# Patient Record
Sex: FEMALE | Race: BLACK OR AFRICAN AMERICAN | Employment: OTHER | URBAN - METROPOLITAN AREA
[De-identification: names, ages, dates, MRNs, and addresses within clinical notes are randomized per-mention and may not be internally consistent; named-entity substitution may affect disease eponyms.]

---

## 2017-11-27 ENCOUNTER — HOSPITAL ENCOUNTER (INPATIENT)
Age: 82
LOS: 2 days | Discharge: HOME | DRG: 700 | End: 2017-11-29
Attending: EMERGENCY MEDICINE | Admitting: FAMILY MEDICINE
Payer: MEDICARE

## 2017-11-27 ENCOUNTER — APPOINTMENT (OUTPATIENT)
Dept: ULTRASOUND IMAGING | Age: 82
DRG: 700 | End: 2017-11-27
Attending: FAMILY MEDICINE
Payer: MEDICARE

## 2017-11-27 ENCOUNTER — APPOINTMENT (OUTPATIENT)
Dept: GENERAL RADIOLOGY | Age: 82
DRG: 700 | End: 2017-11-27
Attending: NURSE PRACTITIONER
Payer: MEDICARE

## 2017-11-27 DIAGNOSIS — N04.9 NEPHROTIC SYNDROME: Primary | ICD-10-CM

## 2017-11-27 DIAGNOSIS — R60.1 ANASARCA: ICD-10-CM

## 2017-11-27 PROBLEM — N17.9 ARF (ACUTE RENAL FAILURE) (HCC): Status: ACTIVE | Noted: 2017-11-27

## 2017-11-27 PROBLEM — R80.9 PROTEINURIA: Status: ACTIVE | Noted: 2017-11-27

## 2017-11-27 LAB
ALBUMIN SERPL-MCNC: 0.9 G/DL (ref 3.5–5)
ALBUMIN/GLOB SERPL: 0.2 {RATIO} (ref 1.1–2.2)
ALP SERPL-CCNC: 89 U/L (ref 45–117)
ALT SERPL-CCNC: 9 U/L (ref 12–78)
ANION GAP SERPL CALC-SCNC: 5 MMOL/L (ref 5–15)
APPEARANCE UR: CLEAR
AST SERPL-CCNC: 28 U/L (ref 15–37)
ATRIAL RATE: 77 BPM
BACTERIA URNS QL MICRO: NEGATIVE /HPF
BASOPHILS # BLD: 0 K/UL (ref 0–0.1)
BASOPHILS NFR BLD: 0 % (ref 0–1)
BILIRUB SERPL-MCNC: 0.2 MG/DL (ref 0.2–1)
BILIRUB UR QL: NEGATIVE
BNP SERPL-MCNC: 1341 PG/ML (ref 0–450)
BUN SERPL-MCNC: 11 MG/DL (ref 6–20)
BUN/CREAT SERPL: 17 (ref 12–20)
CALCIUM SERPL-MCNC: 8.5 MG/DL (ref 8.5–10.1)
CALCULATED R AXIS, ECG10: -8 DEGREES
CALCULATED T AXIS, ECG11: 23 DEGREES
CHLORIDE SERPL-SCNC: 110 MMOL/L (ref 97–108)
CK SERPL-CCNC: 156 U/L (ref 26–192)
CO2 SERPL-SCNC: 25 MMOL/L (ref 21–32)
COLOR UR: ABNORMAL
CREAT SERPL-MCNC: 0.64 MG/DL (ref 0.55–1.02)
CREAT UR-MCNC: 41.4 MG/DL
DIAGNOSIS, 93000: NORMAL
EOSINOPHIL # BLD: 0.1 K/UL (ref 0–0.4)
EOSINOPHIL #/AREA URNS HPF: 0 /[HPF]
EOSINOPHIL NFR BLD: 1 % (ref 0–7)
EPITH CASTS URNS QL MICRO: ABNORMAL /LPF
ERYTHROCYTE [DISTWIDTH] IN BLOOD BY AUTOMATED COUNT: 14.1 % (ref 11.5–14.5)
GLOBULIN SER CALC-MCNC: 3.7 G/DL (ref 2–4)
GLUCOSE BLD STRIP.AUTO-MCNC: 50 MG/DL (ref 65–100)
GLUCOSE BLD STRIP.AUTO-MCNC: 59 MG/DL (ref 65–100)
GLUCOSE BLD STRIP.AUTO-MCNC: 68 MG/DL (ref 65–100)
GLUCOSE BLD STRIP.AUTO-MCNC: 70 MG/DL (ref 65–100)
GLUCOSE BLD STRIP.AUTO-MCNC: 90 MG/DL (ref 65–100)
GLUCOSE BLD STRIP.AUTO-MCNC: 99 MG/DL (ref 65–100)
GLUCOSE SERPL-MCNC: 57 MG/DL (ref 65–100)
GLUCOSE UR STRIP.AUTO-MCNC: NEGATIVE MG/DL
HCT VFR BLD AUTO: 38.1 % (ref 35–47)
HGB BLD-MCNC: 12.3 G/DL (ref 11.5–16)
HGB UR QL STRIP: ABNORMAL
HYALINE CASTS URNS QL MICRO: ABNORMAL /LPF (ref 0–5)
KETONES UR QL STRIP.AUTO: NEGATIVE MG/DL
LEUKOCYTE ESTERASE UR QL STRIP.AUTO: NEGATIVE
LIPASE SERPL-CCNC: 220 U/L (ref 73–393)
LYMPHOCYTES # BLD: 1.5 K/UL (ref 0.8–3.5)
LYMPHOCYTES NFR BLD: 28 % (ref 12–49)
MAGNESIUM SERPL-MCNC: 2.1 MG/DL (ref 1.6–2.4)
MAGNESIUM SERPL-MCNC: 2.3 MG/DL (ref 1.6–2.4)
MCH RBC QN AUTO: 31.7 PG (ref 26–34)
MCHC RBC AUTO-ENTMCNC: 32.3 G/DL (ref 30–36.5)
MCV RBC AUTO: 98.2 FL (ref 80–99)
MONOCYTES # BLD: 0.4 K/UL (ref 0–1)
MONOCYTES NFR BLD: 8 % (ref 5–13)
NEUTS SEG # BLD: 3.4 K/UL (ref 1.8–8)
NEUTS SEG NFR BLD: 63 % (ref 32–75)
NITRITE UR QL STRIP.AUTO: NEGATIVE
PH UR STRIP: 6.5 [PH] (ref 5–8)
PHOSPHATE SERPL-MCNC: 2.9 MG/DL (ref 2.6–4.7)
PLATELET # BLD AUTO: 75 K/UL (ref 150–400)
POTASSIUM SERPL-SCNC: 4.5 MMOL/L (ref 3.5–5.1)
PROT SERPL-MCNC: 4.6 G/DL (ref 6.4–8.2)
PROT UR STRIP-MCNC: 300 MG/DL
PROT UR-MCNC: 481 MG/DL (ref 0–11.9)
PROT/CREAT UR-RTO: 11.6
Q-T INTERVAL, ECG07: 386 MS
QRS DURATION, ECG06: 66 MS
QTC CALCULATION (BEZET), ECG08: 436 MS
RBC # BLD AUTO: 3.88 M/UL (ref 3.8–5.2)
RBC #/AREA URNS HPF: ABNORMAL /HPF (ref 0–5)
SERVICE CMNT-IMP: ABNORMAL
SERVICE CMNT-IMP: ABNORMAL
SERVICE CMNT-IMP: NORMAL
SODIUM SERPL-SCNC: 140 MMOL/L (ref 136–145)
SP GR UR REFRACTOMETRY: 1.01 (ref 1–1.03)
TROPONIN I SERPL-MCNC: <0.04 NG/ML
UR CULT HOLD, URHOLD: NORMAL
UROBILINOGEN UR QL STRIP.AUTO: 0.2 EU/DL (ref 0.2–1)
VENTRICULAR RATE, ECG03: 77 BPM
WBC # BLD AUTO: 5.4 K/UL (ref 3.6–11)
WBC URNS QL MICRO: ABNORMAL /HPF (ref 0–4)

## 2017-11-27 PROCEDURE — 77030005563 HC CATH URETH INT MMGH -A

## 2017-11-27 PROCEDURE — 82784 ASSAY IGA/IGD/IGG/IGM EACH: CPT | Performed by: INTERNAL MEDICINE

## 2017-11-27 PROCEDURE — 71020 XR CHEST PA LAT: CPT

## 2017-11-27 PROCEDURE — 93005 ELECTROCARDIOGRAM TRACING: CPT

## 2017-11-27 PROCEDURE — 84484 ASSAY OF TROPONIN QUANT: CPT | Performed by: NURSE PRACTITIONER

## 2017-11-27 PROCEDURE — 80053 COMPREHEN METABOLIC PANEL: CPT | Performed by: NURSE PRACTITIONER

## 2017-11-27 PROCEDURE — 74011250636 HC RX REV CODE- 250/636: Performed by: INTERNAL MEDICINE

## 2017-11-27 PROCEDURE — 83690 ASSAY OF LIPASE: CPT | Performed by: NURSE PRACTITIONER

## 2017-11-27 PROCEDURE — 51701 INSERT BLADDER CATHETER: CPT

## 2017-11-27 PROCEDURE — 83735 ASSAY OF MAGNESIUM: CPT | Performed by: FAMILY MEDICINE

## 2017-11-27 PROCEDURE — 99285 EMERGENCY DEPT VISIT HI MDM: CPT

## 2017-11-27 PROCEDURE — 36415 COLL VENOUS BLD VENIPUNCTURE: CPT | Performed by: FAMILY MEDICINE

## 2017-11-27 PROCEDURE — 82962 GLUCOSE BLOOD TEST: CPT

## 2017-11-27 PROCEDURE — 84156 ASSAY OF PROTEIN URINE: CPT | Performed by: FAMILY MEDICINE

## 2017-11-27 PROCEDURE — 84166 PROTEIN E-PHORESIS/URINE/CSF: CPT | Performed by: INTERNAL MEDICINE

## 2017-11-27 PROCEDURE — 87205 SMEAR GRAM STAIN: CPT | Performed by: FAMILY MEDICINE

## 2017-11-27 PROCEDURE — 74011000250 HC RX REV CODE- 250: Performed by: FAMILY MEDICINE

## 2017-11-27 PROCEDURE — 93970 EXTREMITY STUDY: CPT

## 2017-11-27 PROCEDURE — 51798 US URINE CAPACITY MEASURE: CPT

## 2017-11-27 PROCEDURE — 83735 ASSAY OF MAGNESIUM: CPT | Performed by: NURSE PRACTITIONER

## 2017-11-27 PROCEDURE — 65270000032 HC RM SEMIPRIVATE

## 2017-11-27 PROCEDURE — 84100 ASSAY OF PHOSPHORUS: CPT | Performed by: FAMILY MEDICINE

## 2017-11-27 PROCEDURE — 83880 ASSAY OF NATRIURETIC PEPTIDE: CPT | Performed by: NURSE PRACTITIONER

## 2017-11-27 PROCEDURE — P9045 ALBUMIN (HUMAN), 5%, 250 ML: HCPCS | Performed by: INTERNAL MEDICINE

## 2017-11-27 PROCEDURE — 84165 PROTEIN E-PHORESIS SERUM: CPT | Performed by: INTERNAL MEDICINE

## 2017-11-27 PROCEDURE — 82550 ASSAY OF CK (CPK): CPT | Performed by: NURSE PRACTITIONER

## 2017-11-27 PROCEDURE — 85025 COMPLETE CBC W/AUTO DIFF WBC: CPT | Performed by: NURSE PRACTITIONER

## 2017-11-27 PROCEDURE — 65660000000 HC RM CCU STEPDOWN

## 2017-11-27 PROCEDURE — 76770 US EXAM ABDO BACK WALL COMP: CPT

## 2017-11-27 PROCEDURE — 81001 URINALYSIS AUTO W/SCOPE: CPT | Performed by: NURSE PRACTITIONER

## 2017-11-27 RX ORDER — FUROSEMIDE 10 MG/ML
20 INJECTION INTRAMUSCULAR; INTRAVENOUS 2 TIMES DAILY
Status: DISCONTINUED | OUTPATIENT
Start: 2017-11-27 | End: 2017-11-27 | Stop reason: SDUPTHER

## 2017-11-27 RX ORDER — METOPROLOL SUCCINATE 50 MG/1
50 TABLET, EXTENDED RELEASE ORAL DAILY
Status: DISCONTINUED | OUTPATIENT
Start: 2017-11-28 | End: 2017-11-29 | Stop reason: HOSPADM

## 2017-11-27 RX ORDER — FUROSEMIDE 10 MG/ML
20 INJECTION INTRAMUSCULAR; INTRAVENOUS EVERY 12 HOURS
Status: DISCONTINUED | OUTPATIENT
Start: 2017-11-27 | End: 2017-11-29 | Stop reason: HOSPADM

## 2017-11-27 RX ORDER — FLUTICASONE PROPIONATE 50 MCG
1 SPRAY, SUSPENSION (ML) NASAL DAILY
COMMUNITY

## 2017-11-27 RX ORDER — SODIUM CHLORIDE 0.9 % (FLUSH) 0.9 %
5-10 SYRINGE (ML) INJECTION EVERY 8 HOURS
Status: DISCONTINUED | OUTPATIENT
Start: 2017-11-27 | End: 2017-11-29 | Stop reason: HOSPADM

## 2017-11-27 RX ORDER — MELATONIN
1000 DAILY
COMMUNITY

## 2017-11-27 RX ORDER — ALBUMIN HUMAN 50 G/1000ML
12.5 SOLUTION INTRAVENOUS EVERY 6 HOURS
Status: DISCONTINUED | OUTPATIENT
Start: 2017-11-27 | End: 2017-11-28 | Stop reason: SDUPTHER

## 2017-11-27 RX ORDER — HEPARIN SODIUM 5000 [USP'U]/ML
5000 INJECTION, SOLUTION INTRAVENOUS; SUBCUTANEOUS EVERY 8 HOURS
Status: CANCELLED | OUTPATIENT
Start: 2017-11-27

## 2017-11-27 RX ORDER — DEXTROSE 50 % IN WATER (D50W) INTRAVENOUS SYRINGE
12.5-25 AS NEEDED
Status: DISCONTINUED | OUTPATIENT
Start: 2017-11-27 | End: 2017-11-29 | Stop reason: HOSPADM

## 2017-11-27 RX ORDER — MAGNESIUM SULFATE 100 %
4 CRYSTALS MISCELLANEOUS AS NEEDED
Status: DISCONTINUED | OUTPATIENT
Start: 2017-11-27 | End: 2017-11-29 | Stop reason: HOSPADM

## 2017-11-27 RX ORDER — AMOXICILLIN AND CLAVULANATE POTASSIUM 875; 125 MG/1; MG/1
1 TABLET, FILM COATED ORAL 2 TIMES DAILY
COMMUNITY
Start: 2017-11-23 | End: 2017-11-29

## 2017-11-27 RX ORDER — SODIUM CHLORIDE 0.9 % (FLUSH) 0.9 %
5-10 SYRINGE (ML) INJECTION AS NEEDED
Status: DISCONTINUED | OUTPATIENT
Start: 2017-11-27 | End: 2017-11-29 | Stop reason: HOSPADM

## 2017-11-27 RX ORDER — SULFAMETHOXAZOLE AND TRIMETHOPRIM 800; 160 MG/1; MG/1
1 TABLET ORAL 2 TIMES DAILY
COMMUNITY
Start: 2017-11-23 | End: 2017-11-29

## 2017-11-27 RX ORDER — ALBUMIN HUMAN 250 G/1000ML
12.5 SOLUTION INTRAVENOUS EVERY 6 HOURS
Status: DISCONTINUED | OUTPATIENT
Start: 2017-11-27 | End: 2017-11-27 | Stop reason: SDUPTHER

## 2017-11-27 RX ADMIN — FUROSEMIDE 20 MG: 10 INJECTION, SOLUTION INTRAMUSCULAR; INTRAVENOUS at 18:18

## 2017-11-27 RX ADMIN — Medication 10 ML: at 21:49

## 2017-11-27 RX ADMIN — Medication 10 ML: at 18:18

## 2017-11-27 RX ADMIN — DEXTROSE MONOHYDRATE 12.5 G: 500 INJECTION PARENTERAL at 19:30

## 2017-11-27 RX ADMIN — ALBUMIN (HUMAN) 12.5 G: 12.5 INJECTION, SOLUTION INTRAVENOUS at 18:43

## 2017-11-27 NOTE — PROGRESS NOTES
Admission Medication Reconciliation:    Information obtained from: patient, RX Query    Significant PMH/Disease States:   Past Medical History:   Diagnosis Date    High cholesterol     Hypercholesterolemia     Hypertension     Other ill-defined conditions(799.89)     kidney stones       Chief Complaint for this Admission:    Chief Complaint   Patient presents with    Hand Swelling       Allergies:  Seafood [shellfish containing products]    Prior to Admission Medications:   Prior to Admission Medications   Prescriptions Last Dose Informant Patient Reported? Taking?   amoxicillin-clavulanate (AUGMENTIN) 875-125 mg per tablet 2017 at AM  Yes Yes   Si Tab two (2) times a day. (Questionable compliance, see note - started 17 of 10 day course, last dose 17 AM)   cholecalciferol (VITAMIN D3) 1,000 unit tablet   Yes Yes   Sig: Take 1,000 Units by mouth daily as needed. fluticasone (FLONASE) 50 mcg/actuation nasal spray   Yes Yes   Si [place] by Both Nostrils route daily. metoprolol-XL (TOPROL XL) 50 mg XL tablet 2017 at AM  Yes Yes   Sig: Take 50 mg by mouth daily. trimethoprim-sulfamethoxazole (BACTRIM DS, SEPTRA DS) 160-800 mg per tablet 2017 at PM  Yes Yes   Sig: Take 1 Tab by mouth two (2) times a day. (Questionable compliance, see note - started 17 on 10 day course, last dose 17 PM)      Facility-Administered Medications: None         Comments/Recommendations: All medications/allergies have been reviewed and updated; last medication administration times reviewed and recorded. Patient is a pleasant woman, but an unreliable historian. Obtained list from [address] [place] [address] and compared to information given by patient.      NOTE: Patient was prescribed 20 tablets (10 day courses) of both Bactrim and Augmentin, but states that she is only taking 1 pill of Augmentin (AM) and 1 pill of Bactrim (PM) each day    Changes made to Prior to Admission (PTA) Medication List: Medications Added:  - None     Medications Changed:  - Augmentin 875/125 frequency of 1 tab BID added   - Bactrim DS frequency of 1 tab BID added     Medications Removed:  - thiamine (B1) 100 mg   - atorvastatin 10 mg       Thank you for allowing me to be involved in the care of this patient. Please contact the pharmacy () or the medication reconciliation pharmacist () with any questions.     Tk Antonio, PharmD Candidate Class of 3537

## 2017-11-27 NOTE — IP AVS SNAPSHOT
[address] 
[phone] Patient: Marie Iniguez MRN: [id number] [id number] About your hospitalization You were admitted on:  November 27, 2017 You last received care in the:  [facility] You were discharged on:  November 29, 2017 Why you were hospitalized Your primary diagnosis was:  Proteinuria Things You Need To Do (next 8 weeks) Schedule an appointment with Tk Zhao MD as soon as possible for a visit in 1 week(s) For follow up of kidney disease Phone:  [phone] Where:  [facility], [address] [place] [address], [address] [place] [address] Discharge Orders None A check nichelle indicates which time of day the medication should be taken. My Medications STOP taking these medications   
 amoxicillin-clavulanate 875-125 mg per tablet Commonly known as:  AUGMENTIN  
   
  
 LIPITOR 10 mg tablet Generic drug:  atorvastatin  
   
  
 trimethoprim-sulfamethoxazole 160-800 mg per tablet Commonly known as:  BACTRIM DS, SEPTRA DS  
   
  
  
TAKE these medications as instructed Instructions Each Dose to Equal  
 Morning Noon Evening Bedtime  
 fluticasone 50 mcg/actuation nasal spray Commonly known as:  [place] Peel Your last dose was: Your next dose is:    
   
   
 1 Spray by Both Nostrils route daily. 1 Spray  
    
   
   
   
  
 furosemide 40 mg tablet Commonly known as:  LASIX Your last dose was: Your next dose is: Take 1 Tab by mouth daily. 40 mg  
    
   
   
   
  
 TOPROL XL 50 mg XL tablet Generic drug:  metoprolol succinate Your last dose was: Your next dose is: Take 50 mg by mouth daily. 50 mg  
    
   
   
   
  
 VITAMIN D3 1,000 unit tablet Generic drug:  cholecalciferol Your last dose was: Your next dose is: Take 1,000 Units by mouth daily as needed. 1000 Units Where to Get Your Medications These medications were sent to [facility], [address], [address] [place] [id number] Phone:  [phone]  
  furosemide 40 mg tablet Discharge Instructions Please bring this form with you to show your primary care provider at your follow-up appointment. Primary care provider:  Dr. Justyna Morrow MD 
 
Discharging provider:  Janet Vazquez MD 
 
You have been admitted to the hospital with the following diagnoses: · Swelling (anasarca) · Protein in the urine FOLLOW-UP CARE RECOMMENDATIONS: 
 
APPOINTMENTS: 
Follow-up Information Follow up With Details Comments Contact Info Anay Mendez MD Schedule an appointment as soon as possible for a visit in 1 week For follow up of kidney disease [facility] [phone] 
[phone] PENDING TEST RESULTS: 
At the time of your discharge the following test results are still pending: Kidney biopsy Please make sure you review these results with your outpatient follow-up provider(s). SYMPTOMS to watch for: flank pain, bleeding, weakness. DIET/what to eat:  Cardiac Diet ACTIVITY:  Activity as tolerated OTHER medication instructions:  Please do not take NSAIDs (ibuprofen, motrin, aspirin, Alleve, naprosyn, etc.). These medicines can hurt your kidneys. If you have pain or fever, you can take acetaminophen (Tylenol). What to do if new or unexpected symptoms occur? If you experience any of the above symptoms (or should other concerns or questions arise after discharge) please call your primary care physician. Return to the emergency room if you cannot get hold of your doctor. · It is very important that you keep your follow-up appointment(s). · Please bring discharge papers, medication list (and/or medication bottles) to your follow-up appointments for review by your outpatient provider(s). · Please check the list of medications and be sure it includes every medication (even non-prescription medications) that your provider wants you to take. · It is important that you take the medication exactly as they are prescribed. · Keep your medication in the bottles provided by the pharmacist and keep a list of the medication names, dosages, and times to be taken in your wallet. · Do not take other medications without consulting your doctor. · If you have any questions about your medications or other instructions, please talk to your nurse or care provider before you leave the hospital. 
 
I understand that if any problems occur once I am at home I am to contact my physician. These instructions were explained to me and I had the opportunity to ask questions. [employer] Announcement We are excited to announce that we are making your provider's discharge notes available to you in [employer]. You will see these notes when they are completed and signed by the physician that discharged you from your recent hospital stay. If you have any questions or concerns about any information you see in [employer], please call the Health Information Department where you were seen or reach out to your Primary Care Provider for more information about your plan of care. Introducing [facility]! Kenya Wilder introduces [employer] patient portal. Now you can access parts of your medical record, email your doctor's office, and request medication refills online. 1. In your internet browser, go to [email or web address] 2. Click on the First Time User? Click Here link in the Sign In box. You will see the New Member Sign Up page. 3. Enter your [employer] Access Code exactly as it appears below.  You will not need to use this code after youve completed the sign-up process. If you do not sign up before the expiration date, you must request a new code. · [employer] Access Code: [email or web address] Expires: 2/27/2018  1:51 PM 
 
4. Enter the last four digits of your Social Security Number (xxxx) and Date of Birth (mm/dd/yyyy) as indicated and click Submit. You will be taken to the next sign-up page. 5. Create a [employer] ID. This will be your [employer] login ID and cannot be changed, so think of one that is secure and easy to remember. 6. Create a [employer] password. You can change your password at any time. 7. Enter your Password Reset Question and Answer. This can be used at a later time if you forget your password. 8. Enter your e-mail address. You will receive e-mail notification when new information is available in [address] 9. Click Sign Up. You can now view and download portions of your medical record. 10. Click the Download Summary menu link to download a portable copy of your medical information. If you have questions, please visit the Frequently Asked Questions section of the [employer] website. Remember, [employer] is NOT to be used for urgent needs. For medical emergencies, dial 911. Now available from your iPhone and Android! Unresulted Labs-Please follow up with your PCP about these lab tests Order Current Status PROTEIN ELECTROPHORESIS In process Providers Seen During Your Hospitalization Provider Specialty Primary office phone Yue Rutledge, [address] [place] [address] Emergency Medicine [phone] Jen Tilley MD Hospitalist [phone] Jazzmine Gottlieb MD Internal Medicine [phone] Immunizations Administered for This Admission Name Date Influenza Vaccine (Quad) PF  Deferred () Your Primary Care Physician (PCP) Primary Care Physician Office Phone Office Fax OTHER, PHYS ** None ** ** None ** You are allergic to the following Allergen Reactions Seafood (Shellfish Containing Products) Swelling Recent Documentation Height Weight BMI OB Status Smoking Status 1.626 m 64.3 kg 24.33 kg/m2 Postmenopausal Former Smoker Emergency Contacts Name Discharge Info Relation Home Work Mobile [address]  Other Relative [6] [phone] [address]  Other Relative [6] [phone] Dean Riley DISCHARGE CAREGIVER [3] Sister [23]   [phone] [facility] [facility] DISCHARGE CAREGIVER [3] Son [22]   [phone] Patient Belongings The following personal items are in your possession at time of discharge: 
     Visual Aid: Glasses, At bedside Please provide this summary of care documentation to your next provider. Signatures-by signing, you are acknowledging that this After Visit Summary has been reviewed with you and you have received a copy. Patient Signature:  ____________________________________________________________ Date:  ____________________________________________________________  
  
[facility] Provider Signature:  ____________________________________________________________ Date:  ____________________________________________________________

## 2017-11-27 NOTE — PROCEDURES
[address]  *** FINAL REPORT ***    Name: Alicia Krishnan  MRN: [id number]    Inpatient  : 29 Dec 1930  HIS Order #: [id number]  [id number] [facility] Visit #: [id number]  Date: 2017    TYPE OF TEST: Peripheral Venous Testing    REASON FOR TEST  LE swelling    Right Leg:-  Deep venous thrombosis:           No  Superficial venous thrombosis:    No  Deep venous insufficiency:        Not examined  Superficial venous insufficiency: Not examined    Left Leg:-  Deep venous thrombosis:           No  Superficial venous thrombosis:    No  Deep venous insufficiency:        Not examined  Superficial venous insufficiency: Not examined      INTERPRETATION/FINDINGS  PROCEDURE:  Color duplex ultrasound imaging of lower extremity veins. FINDINGS:       Right: The common femoral, deep femoral, femoral, popliteal,  posterior tibial, peroneal, and great saphenous are patent and without   evidence of thrombus;  each is fully compressible and there is no  narrowing of the flow channel on color Doppler imaging. Phasic flow  is observed in the common femoral vein. Left:   The common femoral, deep femoral, femoral, popliteal,  posterior tibial, peroneal, and great saphenous are patent and without   evidence of thrombus;  each is fully compressible and there is no  narrowing of the flow channel on color Doppler imaging. Phasic flow  is observed in the common femoral vein. IMPRESSION:  No evidence of right or left lower extremity vein  thrombosis. ADDITIONAL COMMENTS    I have personally reviewed the data relevant to the interpretation of  this  study.     TECHNOLOGIST: Mychal Coats RVT  Signed: 2017 05:10 PM    PHYSICIAN: Patrica Joe MD  Signed: 2017 12:09 PM

## 2017-11-27 NOTE — ED TRIAGE NOTES
Pt c/o of swelling that started in the L hand and is now in the R hand. States that the swelling started two weeks ago. Denies injury. Denies SOB. [facility] for same complaint, was prescribed two antibiotics, but still having swelling.

## 2017-11-27 NOTE — ED PROVIDER NOTES
HPI Comments: Patient is an 80year old female who comes to the ED today via private vehicle with complaints of Bilateral hand swelling. Patient states about 2 weeks ago she began to have the hand swelling. On November 23 rd her son took her to [facility] for the hand swelling. She was diagnosed with cellulitis of the chest wall and started on Augmentin and Bactrim. The swelling did not resolve so she came to the ED today. Patient stated that she also had \"some leg swelling\" that started about 2 weeks ago as well. Patient has a past medical history of HTN, dislipidemia & kidney stones. She denies and liver or CHF, but did state that she has a family history of CHF. Her medications were reviewed and updated. She has no further complaints at this time. Primary Care provider: Delgado Stroud MD      The history is provided by the patient. No  was used. Past Medical History:   Diagnosis Date    High cholesterol     Hypercholesterolemia     Hypertension     Other ill-defined conditions(799.89)     kidney stones       Past Surgical History:   Procedure Laterality Date    HX APPENDECTOMY  1960    REMOVAL GALLBLADDER  11/14/2010         Family History:   Problem Relation Age of Onset    Heart Disease Father     Stroke Sister     Heart Disease Sister     Diabetes Sister        Social History     Social History    Marital status:      Spouse name: N/A    Number of children: N/A    Years of education: N/A     Occupational History    Not on file.      Social History Main Topics    Smoking status: Former Smoker    Smokeless tobacco: Never Used    Alcohol use Yes      Comment: occassionally    Drug use: No    Sexual activity: Yes     Partners: Male     Birth control/ protection: None     Other Topics Concern    Not on file     Social History Narrative    ** Merged History Encounter **              ALLERGIES: Seafood [shellfish containing products]    Review of Systems Constitutional: Positive for activity change. Negative for chills, fatigue and fever. HENT: Positive for congestion. Respiratory: Positive for cough (white sputum). Cardiovascular: Positive for leg swelling (ANASARCA to collarbone and BUE). Negative for chest pain and palpitations. Gastrointestinal: Negative for abdominal pain, constipation, diarrhea, nausea and vomiting. Genitourinary: Negative for difficulty urinating, dysuria and urgency. Musculoskeletal: Negative for arthralgias, back pain and neck pain. Skin: Negative for color change. Neurological: Negative for dizziness, weakness and light-headedness. Psychiatric/Behavioral: Negative. All other systems reviewed and are negative. Vitals:    11/27/17 1146   BP: 136/74   Pulse: 87   Resp: 16   Temp: 97.4 °F (36.3 °C)   SpO2: 100%   Weight: 63.8 kg (140 lb 9.6 oz)   Height: 5' 4\" (1.626 m)            Physical Exam   Constitutional: She is oriented to person, place, and time. She appears well-developed and well-nourished. HENT:   Head: Normocephalic and atraumatic. Cardiovascular: Regular rhythm and normal heart sounds. Pulses:       Radial pulses are 2+ on the right side, and 2+ on the left side. Posterior tibial pulses are 2+ on the right side, and 2+ on the left side. Pulmonary/Chest: No respiratory distress. She has no wheezes. She has no rales. She exhibits no tenderness. Chest wall edema   Abdominal: Soft. Normal appearance and bowel sounds are normal. She exhibits no distension and no mass. There is no tenderness. There is no rebound, no guarding and no CVA tenderness. Abdominal wall edema     Musculoskeletal: She exhibits edema. She exhibits no tenderness or deformity. +4 upper and lower extremity edema. Non-pitting. Neurological: She is alert and oriented to person, place, and time. She has normal strength. She displays a negative Romberg sign. GCS eye subscore is 4. GCS verbal subscore is 5.  GCS motor subscore is 6. Skin: Skin is warm and dry. Psychiatric: She has a normal mood and affect. Her behavior is normal. Judgment and thought content normal.   Nursing note and vitals reviewed. MDM  Number of Diagnoses or Management Options  Anasarca:   Nephrotic syndrome:   Diagnosis management comments: Assessment & Plan:   EKG/Trop-I/CK  Pro-BNP  UA with micro. Hold sample for urine culture  CMP  Lipase  CBC with DIFF    Magnesium  CXR, PA/LAT    Discussed with Dr. Jose Maria Escobar. MD Tana Vázquez NP  11/27/17  12:34 PM    ED EKG interpretation: 1:06 PM  Rhythm: Accelerated Junctional rhythm; and regular . Rate (approx.): 77; Axis: normal; P wave: normal; QRS interval: low voltage QRS; ST/T wave: normal; Other findings: abnormal ekg. This EKG was interpreted by Jose Maria Escobar. Helen Martinez MD,ED Provider. Labs returned:   Hypoalbuminemia  Proteinuria   Hypoglycemia  Mild heart failure    Consult nephrology for nephrotic syndrome  Consult hospitalist for admission    3:08 PM  Patient is being admitted to the hospital.  The results of their tests and reasons for their admission have been discussed with them and/or available family. They convey agreement and understanding for the need to be admitted and for their admission diagnosis. Consultation has been made with the inpatient physician specialist for hospitalization.     LABORATORY TESTS:  Recent Results (from the past 12 hour(s))  -EKG, 12 LEAD, INITIAL  Collection Time: 11/27/17  1:06 PM       Result                                            Value                         Ref Range                       Ventricular Rate                                  77                            BPM                             Atrial Rate                                       77                            BPM                             QRS Duration                                      66                            ms                              Q-T Interval 386                           ms                              QTC Calculation (Bezet)                           436                           ms                              Calculated R Axis                                 -8                            degrees                         Calculated T Axis                                 23                            degrees                         Diagnosis                                                                                                   Accelerated Junctional rhythm Low voltage QRS When compared with ECG of 12-JUN-2012 14:56, Junctional rhythm has replaced Sinus rhythm QRS voltage has decreased Nonspecific T wave abnormality now evident in Anterolateral leads   -METABOLIC PANEL, COMPREHENSIVE  Collection Time: 11/27/17  1:26 PM       Result                                            Value                         Ref Range                       Sodium                                            140                           136 - 145 mmol/L                Potassium                                         4.5                           3.5 - 5.1 mmol/L                Chloride                                          110 (H)                       97 - 108 mmol/L                 CO2                                               25                            21 - 32 mmol/L                  Anion gap                                         5                             5 - 15 mmol/L                   Glucose                                           57 (L)                        65 - 100 mg/dL                  BUN                                               11                            6 - 20 MG/DL                    Creatinine                                        0.64                          0.55 - 1.02 MG/DL               BUN/Creatinine ratio                              17                            12 - 20 GFR est AA                                        >60                           >60 ml/min/1.73m2               GFR est non-AA                                    >60                           >60 ml/min/1.73m2               Calcium                                           8.5                           8.5 - 10.1 MG/DL                Bilirubin, total                                  0.2                           0.2 - 1.0 MG/DL                 ALT (SGPT)                                        9 (L)                         12 - 78 U/L                     AST (SGOT)                                        28                            15 - 37 U/L                     Alk. phosphatase                                  89                            45 - 117 U/L                    Protein, total                                    4.6 (L)                       6.4 - 8.2 g/dL                  Albumin                                           0.9 (L)                       3.5 - 5.0 g/dL                  Globulin                                          3.7                           2.0 - 4.0 g/dL                  A-G Ratio                                         0.2 (L)                       1.1 - 2.2                  -MAGNESIUM  Collection Time: 11/27/17  1:26 PM       Result                                            Value                         Ref Range                       Magnesium                                         2.3                           1.6 - 2.4 mg/dL            -NT-PRO BNP  Collection Time: 11/27/17  1:26 PM       Result                                            Value                         Ref Range                       NT pro-BNP                                        1341 (H)                      0 - 450 PG/ML              -CBC WITH AUTOMATED DIFF  Collection Time: 11/27/17  1:26 PM       Result                                            Value                         Ref Range WBC                                               5.4                           3.6 - 11.0 K/uL                 RBC                                               3.88                          3.80 - 5.20 M/uL                HGB                                               12.3                          11.5 - 16.0 g/dL                HCT                                               38.1                          35.0 - 47.0 %                   MCV                                               98.2                          80.0 - 99.0 FL                  MCH                                               31.7                          26.0 - 34.0 PG                  MCHC                                              32.3                          30.0 - 36.5 g/dL                RDW                                               14.1                          11.5 - 14.5 %                   PLATELET                                          75 (L)                        150 - 400 K/uL                  NEUTROPHILS                                       63                            32 - 75 %                       LYMPHOCYTES                                       28                            12 - 49 %                       MONOCYTES                                         8                             5 - 13 %                        EOSINOPHILS                                       1                             0 - 7 %                         BASOPHILS                                         0                             0 - 1 %                         ABS. NEUTROPHILS                                  3.4                           1.8 - 8.0 K/UL                  ABS. LYMPHOCYTES                                  1.5                           0.8 - 3.5 K/UL                  ABS. MONOCYTES                                    0.4                           0.0 - 1.0 K/UL                  ABS.  EOSINOPHILS 0.1                           0.0 - 0.4 K/UL                  ABS.  BASOPHILS                                    0.0                           0.0 - 0.1 K/UL             -LIPASE  Collection Time: 11/27/17  1:26 PM       Result                                            Value                         Ref Range                       Lipase                                            220                           73 - 393 U/L               -TROPONIN I  Collection Time: 11/27/17  1:26 PM       Result                                            Value                         Ref Range                       Troponin-I, Qt.                                   <0.04                         <0.05 ng/mL                -URINALYSIS W/MICROSCOPIC  Collection Time: 11/27/17  1:26 PM       Result                                            Value                         Ref Range                       Color                                             YELLOW/STRAW                                                  Appearance                                        CLEAR                         CLEAR                           Specific gravity                                  1.012                         1.003 - 1.030                   pH (UA)                                           6.5                           5.0 - 8.0                       Protein                                           300 (A)                       NEG mg/dL                       Glucose                                           NEGATIVE                      NEG mg/dL                       Ketone                                            NEGATIVE                      NEG mg/dL                       Bilirubin                                         NEGATIVE                      NEG                             Blood                                             SMALL (A)                     NEG                             Urobilinogen 0.2                           0.2 - 1.0 EU/dL                 Nitrites                                          NEGATIVE                      NEG                             Leukocyte Esterase                                NEGATIVE                      NEG                             WBC                                               0-4                           0 - 4 /hpf                      RBC                                               5-10                          0 - 5 /hpf                      Epithelial cells                                  FEW                           FEW /lpf                        Bacteria                                          NEGATIVE                      NEG /hpf                        Hyaline cast                                      0-2                           0 - 5 /lpf                 -URINE CULTURE HOLD SAMPLE  Collection Time: 11/27/17  1:26 PM       Result                                            Value                         Ref Range                       Urine culture hold                                                                                          URINE ON HOLD IN MICROBIOLOGY DEPT FOR 3 DAYS  -CK W/ REFLX CKMB  Collection Time: 11/27/17  1:26 PM       Result                                            Value                         Ref Range                       CK                                                156                           26 - 192 U/L                 IMAGING RESULTS:  XR CHEST PA LAT   Final Result     Xr Chest Pa Lat    Result Date: 11/27/2017  EXAM:  XR CHEST PA LAT INDICATION:   anasarca COMPARISON: 11/16/2010. FINDINGS: PA and lateral radiographs of the chest demonstrate small left pleural effusion. There may also be a tiny right pleural effusion. Mliss Argueat Heart size normal. Atherosclerotic change of the aorta is noted. .  Degenerative changes of the thoracic spine noted. IMPRESSION: Small left pleural effusion. MEDICATIONS GIVEN:  Medications - No data to display    IMPRESSION:  Nephrotic syndrome  (primary encounter diagnosis)  Anasarca    PLAN:  1.  Admit to hospitalist    Total critical care time spent exclusive of procedures:  45 minutes      Kerwin Schirmer, NP         Amount and/or Complexity of Data Reviewed  Clinical lab tests: ordered and reviewed  Tests in the radiology section of CPT®: ordered and reviewed  Review and summarize past medical records: yes  Discuss the patient with other providers: yes    Critical Care  Total time providing critical care: 30-74 minutes    Patient Progress  Patient progress: stable    ED Course       Procedures

## 2017-11-27 NOTE — CONSULTS
NEPHROLOGY CONSULT NOTE     Patient: Sabina Paulino MRN: [id number]  PCP: Sohail Andrea MD   :     1930  Age:   80 y.o. Sex:  female      Referring physician: Yue Persaud. Mariola Rutledge MD  Reason for consultation: 80 y.o. female with edema and proteinuria  Admission Date: 2017 11:50 AM  LOS: 0 days      ASSESSMENT and PLAN :   Anasarca:  - could be related to her proteinuria  - agree with IV albumin and lasix for now  - would check dopplers of her upper exts  - check ECHO eval her LV fn  - will check urine PCR to quantify her proteinuria    Proteinuria:  - concern for CORNELIO given her recent exposure to regular NSAIDs  - unlikely abx since her symptoms predated her abx exposure  - other possibilities include Membranous, FSGS or paraprotein-related d/o  - check urine PCR, SPEP, UPEP, MIKA    HTN:  - BP stable    Anemia:  - hgb stable     Active Problems / Assessment AAActive  : Active Problems:    ARF (acute renal failure) ([facility] [address] (2017)         Subjective:   HPI: Sabina Paulino is a 80 y.o.  female who has been admitted to the hospital for bilateral hand swelling. [facility]. She was diagnosed with cellulitis of the chest wall and was prescribed augmentin and bactrim and sent home. She has no hx of DM. Her UA showed 300 protein. She has some LE edema, but more in her upper exts. Her CXR showed a small L pleural effusion. She admits to taking advil PM every night for the past month to help her sleep. No cp, sob, n/v/d reported.     Past Medical Hx:   Past Medical History:   Diagnosis Date    High cholesterol     Hypercholesterolemia     Hypertension     Other ill-defined conditions(799.89)     kidney stones        Past Surgical Hx:     Past Surgical History:   Procedure Laterality Date    HX APPENDECTOMY  1960    REMOVAL GALLBLADDER  2010       Medications:  Prior to Admission medications    Medication Sig Start Date End Date Taking? Authorizing Provider   amoxicillin-clavulanate (AUGMENTIN) 875-125 mg per tablet 1 Tab two (2) times a day. (Questionable compliance, see note - started 11/23/17 of 10 day course, last dose 11/27/17 AM) 11/23/17  Yes    trimethoprim-sulfamethoxazole (BACTRIM DS, SEPTRA DS) 160-800 mg per tablet Take 1 Tab by mouth two (2) times a day. (Questionable compliance, see note - started 11/23/17 on 10 day course, last dose 11/26/17 PM) 11/23/17  Yes Phys Other, MD   fluticasone (FLONASE) 50 mcg/actuation nasal spray 1 [place] by Both Nostrils route daily. Yes Delgado Other, MD   cholecalciferol (VITAMIN D3) 1,000 unit tablet Take 1,000 Units by mouth daily as needed. Yes Historical Provider   metoprolol-XL (TOPROL XL) 50 mg XL tablet Take 50 mg by mouth daily. Yes Historical Provider       Allergies   Allergen Reactions    Seafood [Shellfish Containing Products] Swelling       Social Hx:  reports that she has quit smoking. She has never used smokeless tobacco. She reports that she drinks alcohol. She reports that she does not use illicit drugs. Family History   Problem Relation Age of Onset    Heart Disease Father     Stroke Sister     Heart Disease Sister     Diabetes Sister        Review of Systems:  A twelve point review of system was performed today. Pertinent positives and negatives are mentioned in the HPI. The reminder of the ROS is negative and noncontributory. Objective:    Vitals:    Vitals:    11/27/17 1146 11/27/17 1312 11/27/17 1314 11/27/17 1430   BP: 136/74 123/65  133/72   Pulse: 87   77   Resp: 16   10   Temp: 97.4 °F (36.3 °C)      SpO2: 100%  100% 99%   Weight: 63.8 kg (140 lb 9.6 oz)      Height: 5' 4\" (1.626 m)        I&O's:     Visit Vitals    /72    Pulse 77    Temp 97.4 °F (36.3 °C)    Resp 10    Ht 5' 4\" (1.626 m)    Wt 63.8 kg (140 lb 9.6 oz)    SpO2 99%    BMI 24.13 kg/m2       Physical Exam:  General:Alert, No distress,   HEENT: Eyes are PERRL. Conjunctiva without pallor ,erythema. The sclerae without icterus. .   Neck:Supple,no mass palpable  Lungs : Clears to auscultation Bilaterally, Normal respiratory effort  CVS: RRR, S1 S2 normal, No rub, 1+ LE edema, 2+ upper ext edema  Abdomen: Soft, Non tender, No ascites present  Extremities: No cyanosis, No clubbing  Skin: No rash or lesions. Lymph nodes: No palpable nodes  MS: No joint swelling, erythema, warmth  Neurologic: non focal, AAO x 3  Psych: normal affect    Laboratory Results:    Lab Results   Component Value Date    BUN 11 11/27/2017     11/27/2017    K 4.5 11/27/2017     (H) 11/27/2017    CO2 25 11/27/2017       Lab Results   Component Value Date    BUN 11 11/27/2017    BUN 11 06/12/2012    BUN 13 01/20/2012    BUN 9 11/16/2010    BUN 12 11/15/2010    K 4.5 11/27/2017    K 4.0 06/12/2012    K 3.5 01/20/2012    K 3.1 (L) 11/16/2010    K 3.4 (L) 11/15/2010       Lab Results   Component Value Date    WBC 5.4 11/27/2017    RBC 3.88 11/27/2017    HGB 12.3 11/27/2017    HCT 38.1 11/27/2017    MCV 98.2 11/27/2017    MCH 31.7 11/27/2017    RDW 14.1 11/27/2017    PLT 75 (L) 11/27/2017       No results found for: PTH, PHOS    Urine dipstick:   Lab Results   Component Value Date/Time    Color YELLOW/STRAW 11/27/2017 01:26 PM    Appearance CLEAR 11/27/2017 01:26 PM    Specific gravity 1.012 11/27/2017 01:26 PM    pH (UA) 6.5 11/27/2017 01:26 PM    Protein 300 11/27/2017 01:26 PM    Glucose NEGATIVE  11/27/2017 01:26 PM    Ketone NEGATIVE  11/27/2017 01:26 PM    Bilirubin NEGATIVE  11/27/2017 01:26 PM    Urobilinogen 0.2 11/27/2017 01:26 PM    Nitrites NEGATIVE  11/27/2017 01:26 PM    Leukocyte Esterase NEGATIVE  11/27/2017 01:26 PM    Epithelial cells FEW 11/27/2017 01:26 PM    Bacteria NEGATIVE  11/27/2017 01:26 PM    WBC 0-4 11/27/2017 01:26 PM    RBC 5-10 11/27/2017 01:26 PM       I have reviewed the following:    All pertinent labs, microbiology data, radiology imaging for my assessment Thank you for allowing us to participate in the care of this patient. We will follow patient.  Please dont hesitate to call with any questions    Abdullahi Hernandez MD  11/27/2017    [address] [place] [address]

## 2017-11-27 NOTE — ED NOTES
TRANSFER - OUT REPORT:    Verbal report given to [facility], RN(name) on Oliver Alav  being transferred to [facility] (unit) for routine progression of care       Report consisted of patients Situation, Background, Assessment and   Recommendations(SBAR). Information from the following report(s) SBAR, Kardex, ED Summary, Intake/Output, MAR, Recent Results and Cardiac Rhythm Accelerated junctional was reviewed with the receiving nurse. Lines:       Opportunity for questions and clarification was provided.

## 2017-11-27 NOTE — ED NOTES
Assumed care of patient. Verbal and bedside report received from Joel Choe [place]. Patient resting quietly on stretcher, visitor at bedside. Pt appears in no acute distress, respirations equal and unlabored. VS WNL. Call bell within reach.

## 2017-11-28 LAB
ALBUMIN SERPL-MCNC: 1.1 G/DL (ref 3.5–5)
ANION GAP SERPL CALC-SCNC: 7 MMOL/L (ref 5–15)
BASOPHILS # BLD: 0 K/UL (ref 0–0.1)
BASOPHILS NFR BLD: 0 % (ref 0–1)
BUN SERPL-MCNC: 8 MG/DL (ref 6–20)
BUN/CREAT SERPL: 13 (ref 12–20)
CALCIUM SERPL-MCNC: 8 MG/DL (ref 8.5–10.1)
CHLORIDE SERPL-SCNC: 111 MMOL/L (ref 97–108)
CO2 SERPL-SCNC: 25 MMOL/L (ref 21–32)
CREAT SERPL-MCNC: 0.63 MG/DL (ref 0.55–1.02)
EOSINOPHIL # BLD: 0.1 K/UL (ref 0–0.4)
EOSINOPHIL NFR BLD: 2 % (ref 0–7)
ERYTHROCYTE [DISTWIDTH] IN BLOOD BY AUTOMATED COUNT: 14 % (ref 11.5–14.5)
GLUCOSE BLD STRIP.AUTO-MCNC: 128 MG/DL (ref 65–100)
GLUCOSE BLD STRIP.AUTO-MCNC: 148 MG/DL (ref 65–100)
GLUCOSE BLD STRIP.AUTO-MCNC: 65 MG/DL (ref 65–100)
GLUCOSE BLD STRIP.AUTO-MCNC: 69 MG/DL (ref 65–100)
GLUCOSE BLD STRIP.AUTO-MCNC: 69 MG/DL (ref 65–100)
GLUCOSE BLD STRIP.AUTO-MCNC: 70 MG/DL (ref 65–100)
GLUCOSE BLD STRIP.AUTO-MCNC: 75 MG/DL (ref 65–100)
GLUCOSE BLD STRIP.AUTO-MCNC: 80 MG/DL (ref 65–100)
GLUCOSE BLD STRIP.AUTO-MCNC: 88 MG/DL (ref 65–100)
GLUCOSE BLD STRIP.AUTO-MCNC: 91 MG/DL (ref 65–100)
GLUCOSE BLD STRIP.AUTO-MCNC: 98 MG/DL (ref 65–100)
GLUCOSE SERPL-MCNC: 81 MG/DL (ref 65–100)
HCT VFR BLD AUTO: 33.4 % (ref 35–47)
HGB BLD-MCNC: 10.7 G/DL (ref 11.5–16)
INR PPP: 1 (ref 0.9–1.1)
LYMPHOCYTES # BLD: 1.3 K/UL (ref 0.8–3.5)
LYMPHOCYTES NFR BLD: 29 % (ref 12–49)
MCH RBC QN AUTO: 31.5 PG (ref 26–34)
MCHC RBC AUTO-ENTMCNC: 32 G/DL (ref 30–36.5)
MCV RBC AUTO: 98.2 FL (ref 80–99)
MONOCYTES # BLD: 0.4 K/UL (ref 0–1)
MONOCYTES NFR BLD: 10 % (ref 5–13)
NEUTS SEG # BLD: 2.7 K/UL (ref 1.8–8)
NEUTS SEG NFR BLD: 59 % (ref 32–75)
PHOSPHATE SERPL-MCNC: 3.1 MG/DL (ref 2.6–4.7)
PLATELET # BLD AUTO: 190 K/UL (ref 150–400)
POTASSIUM SERPL-SCNC: 4.3 MMOL/L (ref 3.5–5.1)
PROTHROMBIN TIME: 10.5 SEC (ref 9–11.1)
RBC # BLD AUTO: 3.4 M/UL (ref 3.8–5.2)
SERVICE CMNT-IMP: ABNORMAL
SERVICE CMNT-IMP: ABNORMAL
SERVICE CMNT-IMP: NORMAL
SODIUM SERPL-SCNC: 143 MMOL/L (ref 136–145)
WBC # BLD AUTO: 4.6 K/UL (ref 3.6–11)

## 2017-11-28 PROCEDURE — 74011250637 HC RX REV CODE- 250/637: Performed by: FAMILY MEDICINE

## 2017-11-28 PROCEDURE — 87389 HIV-1 AG W/HIV-1&-2 AB AG IA: CPT | Performed by: INTERNAL MEDICINE

## 2017-11-28 PROCEDURE — 74011250636 HC RX REV CODE- 250/636: Performed by: INTERNAL MEDICINE

## 2017-11-28 PROCEDURE — 36415 COLL VENOUS BLD VENIPUNCTURE: CPT | Performed by: INTERNAL MEDICINE

## 2017-11-28 PROCEDURE — 65660000000 HC RM CCU STEPDOWN

## 2017-11-28 PROCEDURE — 80069 RENAL FUNCTION PANEL: CPT | Performed by: INTERNAL MEDICINE

## 2017-11-28 PROCEDURE — 86038 ANTINUCLEAR ANTIBODIES: CPT | Performed by: INTERNAL MEDICINE

## 2017-11-28 PROCEDURE — 85025 COMPLETE CBC W/AUTO DIFF WBC: CPT | Performed by: FAMILY MEDICINE

## 2017-11-28 PROCEDURE — 93306 TTE W/DOPPLER COMPLETE: CPT

## 2017-11-28 PROCEDURE — 74011000250 HC RX REV CODE- 250: Performed by: FAMILY MEDICINE

## 2017-11-28 PROCEDURE — 85610 PROTHROMBIN TIME: CPT | Performed by: INTERNAL MEDICINE

## 2017-11-28 PROCEDURE — 74011250636 HC RX REV CODE- 250/636: Performed by: HOSPITALIST

## 2017-11-28 PROCEDURE — 80074 ACUTE HEPATITIS PANEL: CPT | Performed by: INTERNAL MEDICINE

## 2017-11-28 PROCEDURE — P9047 ALBUMIN (HUMAN), 25%, 50ML: HCPCS | Performed by: HOSPITALIST

## 2017-11-28 PROCEDURE — 82962 GLUCOSE BLOOD TEST: CPT

## 2017-11-28 PROCEDURE — P9045 ALBUMIN (HUMAN), 5%, 250 ML: HCPCS | Performed by: INTERNAL MEDICINE

## 2017-11-28 RX ORDER — ALBUMIN HUMAN 250 G/1000ML
25 SOLUTION INTRAVENOUS EVERY 12 HOURS
Status: DISCONTINUED | OUTPATIENT
Start: 2017-11-28 | End: 2017-11-29 | Stop reason: HOSPADM

## 2017-11-28 RX ORDER — ALBUMIN HUMAN 50 G/1000ML
12.5 SOLUTION INTRAVENOUS EVERY 6 HOURS
Status: DISCONTINUED | OUTPATIENT
Start: 2017-11-28 | End: 2017-11-28

## 2017-11-28 RX ADMIN — Medication 10 ML: at 05:54

## 2017-11-28 RX ADMIN — ALBUMIN (HUMAN) 12.5 G: 12.5 INJECTION, SOLUTION INTRAVENOUS at 05:54

## 2017-11-28 RX ADMIN — Medication 16 G: at 11:25

## 2017-11-28 RX ADMIN — ALBUMIN (HUMAN) 25 G: 0.25 INJECTION, SOLUTION INTRAVENOUS at 21:22

## 2017-11-28 RX ADMIN — Medication 16 G: at 15:32

## 2017-11-28 RX ADMIN — Medication 10 ML: at 21:23

## 2017-11-28 RX ADMIN — ALBUMIN (HUMAN) 12.5 G: 12.5 INJECTION, SOLUTION INTRAVENOUS at 11:11

## 2017-11-28 RX ADMIN — METOPROLOL SUCCINATE 50 MG: 50 TABLET, EXTENDED RELEASE ORAL at 08:58

## 2017-11-28 RX ADMIN — DEXTROSE MONOHYDRATE 12.5 G: 500 INJECTION PARENTERAL at 22:38

## 2017-11-28 RX ADMIN — FUROSEMIDE 20 MG: 10 INJECTION, SOLUTION INTRAMUSCULAR; INTRAVENOUS at 08:58

## 2017-11-28 RX ADMIN — FUROSEMIDE 20 MG: 10 INJECTION, SOLUTION INTRAMUSCULAR; INTRAVENOUS at 21:22

## 2017-11-28 RX ADMIN — Medication 16 G: at 22:01

## 2017-11-28 RX ADMIN — Medication 10 ML: at 14:00

## 2017-11-28 RX ADMIN — Medication 16 G: at 21:42

## 2017-11-28 NOTE — PROGRESS NOTES
1530: PTs sugar is 80, gave 4 glucose tablets, will recheck. 1630: BS is 134, will continue to monitor.

## 2017-11-28 NOTE — PROGRESS NOTES
Abby Briceno MD   Phone/text: [phone] (7am to 7pm)  After 7pm please call  for hospitalist on call    Hospitalist Progress Note     2017   PCP:  Dr. Meena Baca MD  Chief Complaint   Patient presents with    Hand Swelling       Admission Summary:   The patient is an 80-year-old female with past medical history of cholecystitis, history of hypertension, allergies, who presents to the hospital with anasarca. Reason For Visit:  Anasarca    Assessment/Plan   Anasarca with hypoalbuminemia likely due to nephrotic syndrome (POA) - due to NSAIDs? - Renal US  unremarkable  - LE doppler  without DVT  - Continue lasix, change albumin to 25%  - Labs   - Nephrology consulted - plan for biopsy tomorrow    Thrombocytopenia (POA) - resolved without intervention    Hypertension (chronic) - BP stable, continue home metoprolol    See orders for other plans. VTE prophylaxis: SCDs  Discussed plan of care with Patient/Family and Nurse   Pre-admission lived at home  Discharge plan: home  Estimated time to discharge: tomorrow after biopsy     Subjective   Ms. Shanda Montiel is feeling fine. Swelling is going down. Reviewed interval history  Physical examination     Visit Vitals    /84 (BP 1 Location: Right arm, BP Patient Position: At rest)    Pulse 67    Temp 97.9 °F (36.6 °C)    Resp 16    Ht 5' 4\" (1.626 m)    Wt 64.3 kg (141 lb 12.1 oz)    SpO2 97%    BMI 24.33 kg/m2       Temp (24hrs), Av.6 °F (36.4 °C), Min:97.3 °F (36.3 °C), Max:97.9 °F (36.6 °C)      Intake/Output Summary (Last 24 hours) at 17 1411  Last data filed at 17 1111   Gross per 24 hour   Intake              480 ml   Output              300 ml   Net              180 ml       Gen - NAD  HEENT - MMM  Neck - supple, full ROM  CV - RRR, no MRG  Resp - lungs CTA b/l, no wheezing, normal WOB  GI - abdomen S, NT, ND, +BS.  No hepatosplenomegaly   - no CVA tenderness, bladder non-palpable in lower abdomen  MSK - normal tone and bulk, 1+ edema in bilateral upper and lower extremities  Neuro - A&O, no focal deficits  Psych - calm and cooperative with normal affect    Data Review       Telemetry    ECG x   Xray    CT scan    MRI    Echocardiogram    Ultrasound x             I have reviewed the flow sheet and recent notes  New labs and data personally reviewed.     Recent Labs      11/28/17   0336  11/27/17   1326   WBC  4.6  5.4   HGB  10.7*  12.3   HCT  33.4*  38.1   PLT  190  75*     Recent Labs      11/28/17   1203  11/28/17   0336  11/27/17   1832  11/27/17   1326   NA   --   143   --   140   K   --   4.3   --   4.5   CL   --   111*   --   110*   CO2   --   25   --   25   GLU   --   81   --   57*   BUN   --   8   --   11   CREA   --   0.63   --   0.64   CA   --   8.0*   --   8.5   MG   --    --   2.1  2.3   PHOS   --   3.1  2.9   --    ALB   --   1.1*   --   0.9*   TBILI   --    --    --   0.2   SGOT   --    --    --   28   ALT   --    --    --   9*   INR  1.0   --    --    --        Medications reviewed  Current Facility-Administered Medications   Medication Dose Route Frequency    influenza vaccine 2017-18 (3 yrs+)(PF) (FLUZONE QUAD/FLUARIX QUAD) injection 0.5 mL  0.5 mL IntraMUSCular PRIOR TO DISCHARGE    albumin human 25% (BUMINATE) solution 25 g  25 g IntraVENous Q12H    metoprolol succinate (TOPROL-XL) XL tablet 50 mg  50 mg Oral DAILY    sodium chloride (NS) flush 5-10 mL  5-10 mL IntraVENous Q8H    sodium chloride (NS) flush 5-10 mL  5-10 mL IntraVENous PRN    furosemide (LASIX) injection 20 mg  20 mg IntraVENous Q12H    glucose chewable tablet 16 g  4 Tab Oral PRN    dextrose (D50W) injection syrg 12.5-25 g  12.5-25 g IntraVENous PRN    glucagon (GLUCAGEN) injection 1 mg  1 mg IntraMUSCular PRN         Dearl MD Eliane  Internal Medicine  11/28/2017

## 2017-11-28 NOTE — PROGRESS NOTES
Nephrology Progress Note  Keke Andre  Date of Admission : 11/27/2017    CC: Follow up for proteinuria       Assessment and Plan     Anasarca:  - from nephrotic-range proteinruia  - cont diuresis  - LE dopplers neg for DVT  - ECHO pending    Proteinuria:  - 11g/g on her PCR  - concern for CORNELIO given her recent exposure to regular NSAIDs  - unlikely abx since her symptoms predated her abx exposure  - other possibilities include Membranous, FSGS or paraprotein-related d/o  - check NICHOL, hepatitis panel and HIV today  - renal bx planned for tomorrow AM     HTN:  - BP stable     Anemia:  - hgb stable       Interval History:  Seen and examined. Feeling ok. Edema better. No cp, sob, n/v/d reported. Current Medications: all current  Medications have been eviewed in EPIC  Review of Systems: Pertinent items are noted in HPI. Objective:  Vitals:    Vitals:    11/27/17 1711 11/27/17 2126 11/28/17 0232 11/28/17 0806   BP: 144/63 139/90 154/70 160/84   Pulse: 67 60 73 67   Resp: 16 18 16 16   Temp: 97.7 °F (36.5 °C) 97.3 °F (36.3 °C) 97.6 °F (36.4 °C) 97.9 °F (36.6 °C)   SpO2: 99% 99% 99% 97%   Weight: 63.2 kg (139 lb 5.3 oz)   64.3 kg (141 lb 12.1 oz)   Height:         Intake and Output:  11/28 0701 - 11/28 1900  In: 240 [P.O.:240]  Out: -   11/26 1901 - 11/28 0700  In: -   Out: 500 [Urine:500]    Physical Examination:   General: NAD,Conversant   Neck:  Supple, no mass  Resp:  Lungs CTA B/L, no wheezing , normal respiratory effort  CV:  RRR,  no murmur or rub, 1-2 + LE edema  GI:  Soft, NT, + Bowel sounds, no hepatosplenomegaly  Neurologic:  Non focal  Psych:             AAO x 3 appropriate affect   Skin:  No Rash  :  Layton in place    []    High complexity decision making was performed  []    Patient is at high-risk of decompensation with multiple organ involvement    Lab Data Personally Reviewed: I have reviewed all the pertinent labs, microbiology data and radiology studies during assessment.     Recent Labs 11/28/17   0336  11/27/17   1832  11/27/17   1326   NA  143   --   140   K  4.3   --   4.5   CL  111*   --   110*   CO2  25   --   25   GLU  81   --   57*   BUN  8   --   11   CREA  0.63   --   0.64   CA  8.0*   --   8.5   MG   --   2.1  2.3   PHOS  3.1  2.9   --    ALB  1.1*   --   0.9*   SGOT   --    --   28   ALT   --    --   9*     Recent Labs      11/28/17   0336  11/27/17   1326   WBC  4.6  5.4   HGB  10.7*  12.3   HCT  33.4*  38.1   PLT  190  75*     Lab Results   Component Value Date/Time    Specimen Description: URINE 06/12/2012 04:08 PM     Lab Results   Component Value Date/Time    Culture result: NO SIGNIFICANT GROWTH 06/12/2012 04:08 PM     Recent Results (from the past 24 hour(s))   EKG, 12 LEAD, INITIAL    Collection Time: 11/27/17  1:06 PM   Result Value Ref Range    Ventricular Rate 77 BPM    Atrial Rate 77 BPM    QRS Duration 66 ms    Q-T Interval 386 ms    QTC Calculation (Bezet) 436 ms    Calculated R Axis -8 degrees    Calculated T Axis 23 degrees    Diagnosis       Normal sinus rhythm  Low voltage QRS  When compared with ECG of 12-JUN-2012 14:56,  QRS voltage has decreased  Nonspecific T wave abnormality now evident in Anterolateral leads  Confirmed by Karishma Andrade MD ([id number]) on 11/27/2017 6:63:40 PM     METABOLIC PANEL, COMPREHENSIVE    Collection Time: 11/27/17  1:26 PM   Result Value Ref Range    Sodium 140 136 - 145 mmol/L    Potassium 4.5 3.5 - 5.1 mmol/L    Chloride 110 (H) 97 - 108 mmol/L    CO2 25 21 - 32 mmol/L    Anion gap 5 5 - 15 mmol/L    Glucose 57 (L) 65 - 100 mg/dL    BUN 11 6 - 20 MG/DL    Creatinine 0.64 0.55 - 1.02 MG/DL    BUN/Creatinine ratio 17 12 - 20      GFR est AA >60 >60 ml/min/1.73m2    GFR est non-AA >60 >60 ml/min/1.73m2    Calcium 8.5 8.5 - 10.1 MG/DL    Bilirubin, total 0.2 0.2 - 1.0 MG/DL    ALT (SGPT) 9 (L) 12 - 78 U/L    AST (SGOT) 28 15 - 37 U/L    Alk.  phosphatase 89 45 - 117 U/L    Protein, total 4.6 (L) 6.4 - 8.2 g/dL    Albumin 0.9 (L) 3.5 - 5.0 g/dL    Globulin 3.7 2.0 - 4.0 g/dL    A-G Ratio 0.2 (L) 1.1 - 2.2     MAGNESIUM    Collection Time: 11/27/17  1:26 PM   Result Value Ref Range    Magnesium 2.3 1.6 - 2.4 mg/dL   NT-PRO BNP    Collection Time: 11/27/17  1:26 PM   Result Value Ref Range    NT pro-BNP 1341 (H) 0 - 450 PG/ML   CBC WITH AUTOMATED DIFF    Collection Time: 11/27/17  1:26 PM   Result Value Ref Range    WBC 5.4 3.6 - 11.0 K/uL    RBC 3.88 3.80 - 5.20 M/uL    HGB 12.3 11.5 - 16.0 g/dL    HCT 38.1 35.0 - 47.0 %    MCV 98.2 80.0 - 99.0 FL    MCH 31.7 26.0 - 34.0 PG    MCHC 32.3 30.0 - 36.5 g/dL    RDW 14.1 11.5 - 14.5 %    PLATELET 75 (L) 935 - 400 K/uL    NEUTROPHILS 63 32 - 75 %    LYMPHOCYTES 28 12 - 49 %    MONOCYTES 8 5 - 13 %    EOSINOPHILS 1 0 - 7 %    BASOPHILS 0 0 - 1 %    ABS. NEUTROPHILS 3.4 1.8 - 8.0 K/UL    ABS. LYMPHOCYTES 1.5 0.8 - 3.5 K/UL    ABS. MONOCYTES 0.4 0.0 - 1.0 K/UL    ABS. EOSINOPHILS 0.1 0.0 - 0.4 K/UL    ABS.  BASOPHILS 0.0 0.0 - 0.1 K/UL   LIPASE    Collection Time: 11/27/17  1:26 PM   Result Value Ref Range    Lipase 220 73 - 393 U/L   TROPONIN I    Collection Time: 11/27/17  1:26 PM   Result Value Ref Range    Troponin-I, Qt. <0.04 <0.05 ng/mL   URINALYSIS W/MICROSCOPIC    Collection Time: 11/27/17  1:26 PM   Result Value Ref Range    Color YELLOW/STRAW      Appearance CLEAR CLEAR      Specific gravity 1.012 1.003 - 1.030      pH (UA) 6.5 5.0 - 8.0      Protein 300 (A) NEG mg/dL    Glucose NEGATIVE  NEG mg/dL    Ketone NEGATIVE  NEG mg/dL    Bilirubin NEGATIVE  NEG      Blood SMALL (A) NEG      Urobilinogen 0.2 0.2 - 1.0 EU/dL    Nitrites NEGATIVE  NEG      Leukocyte Esterase NEGATIVE  NEG      WBC 0-4 0 - 4 /hpf    RBC 5-10 0 - 5 /hpf    Epithelial cells FEW FEW /lpf    Bacteria NEGATIVE  NEG /hpf    Hyaline cast 0-2 0 - 5 /lpf   URINE CULTURE HOLD SAMPLE    Collection Time: 11/27/17  1:26 PM   Result Value Ref Range    Urine culture hold URINE ON HOLD IN MICROBIOLOGY DEPT FOR 3 DAYS     CK W/ REFLX CKMB Collection Time: 11/27/17  1:26 PM   Result Value Ref Range     26 - 192 U/L   PROTEIN/CREATININE RATIO, URINE    Collection Time: 11/27/17  4:13 PM   Result Value Ref Range    Protein, urine random 481 (H) 0.0 - 11.9 mg/dL    Creatinine, urine 41.40 mg/dL    Protein/Creat.  urine Ratio 11.6     GLUCOSE, POC    Collection Time: 11/27/17  6:07 PM   Result Value Ref Range    Glucose (POC) 50 (L) 65 - 100 mg/dL    Performed by Rosalee Nur, POC    Collection Time: 11/27/17  6:24 PM   Result Value Ref Range    Glucose (POC) 59 (L) 65 - 100 mg/dL    Performed by Harshil Wheeler    MAGNESIUM    Collection Time: 11/27/17  6:32 PM   Result Value Ref Range    Magnesium 2.1 1.6 - 2.4 mg/dL   PHOSPHORUS    Collection Time: 11/27/17  6:32 PM   Result Value Ref Range    Phosphorus 2.9 2.6 - 4.7 MG/DL   GLUCOSE, POC    Collection Time: 11/27/17  6:45 PM   Result Value Ref Range    Glucose (POC) 70 65 - 100 mg/dL    Performed by Rosalee Nur, POC    Collection Time: 11/27/17  7:07 PM   Result Value Ref Range    Glucose (POC) 68 65 - 100 mg/dL    Performed by Rosalee Nur, POC    Collection Time: 11/27/17  7:55 PM   Result Value Ref Range    Glucose (POC) 99 65 - 100 mg/dL    Performed by Kalina Turner, URINE    Collection Time: 11/27/17  9:34 PM   Result Value Ref Range    Eosinophils,urine 0     GLUCOSE, POC    Collection Time: 11/27/17 11:14 PM   Result Value Ref Range    Glucose (POC) 90 65 - 100 mg/dL    Performed by Tarik Mclaughlin, POC    Collection Time: 11/28/17  2:52 AM   Result Value Ref Range    Glucose (POC) 88 65 - 100 mg/dL    Performed by Alfonse Simmonds    RENAL FUNCTION PANEL    Collection Time: 11/28/17  3:36 AM   Result Value Ref Range    Sodium 143 136 - 145 mmol/L    Potassium 4.3 3.5 - 5.1 mmol/L    Chloride 111 (H) 97 - 108 mmol/L    CO2 25 21 - 32 mmol/L    Anion gap 7 5 - 15 mmol/L    Glucose 81 65 - 100 mg/dL    BUN 8 6 - 20 MG/DL    Creatinine 0.63 0.55 - 1.02 MG/DL    BUN/Creatinine ratio 13 12 - 20      GFR est AA >60 >60 ml/min/1.73m2    GFR est non-AA >60 >60 ml/min/1.73m2    Calcium 8.0 (L) 8.5 - 10.1 MG/DL    Phosphorus 3.1 2.6 - 4.7 MG/DL    Albumin 1.1 (L) 3.5 - 5.0 g/dL   CBC WITH AUTOMATED DIFF    Collection Time: 11/28/17  3:36 AM   Result Value Ref Range    WBC 4.6 3.6 - 11.0 K/uL    RBC 3.40 (L) 3.80 - 5.20 M/uL    HGB 10.7 (L) 11.5 - 16.0 g/dL    HCT 33.4 (L) 35.0 - 47.0 %    MCV 98.2 80.0 - 99.0 FL    MCH 31.5 26.0 - 34.0 PG    MCHC 32.0 30.0 - 36.5 g/dL    RDW 14.0 11.5 - 14.5 %    PLATELET 083 236 - 951 K/uL    NEUTROPHILS 59 32 - 75 %    LYMPHOCYTES 29 12 - 49 %    MONOCYTES 10 5 - 13 %    EOSINOPHILS 2 0 - 7 %    BASOPHILS 0 0 - 1 %    ABS. NEUTROPHILS 2.7 1.8 - 8.0 K/UL    ABS. LYMPHOCYTES 1.3 0.8 - 3.5 K/UL    ABS. MONOCYTES 0.4 0.0 - 1.0 K/UL    ABS. EOSINOPHILS 0.1 0.0 - 0.4 K/UL    ABS. BASOPHILS 0.0 0.0 - 0.1 K/UL   GLUCOSE, POC    Collection Time: 11/28/17  6:18 AM   Result Value Ref Range    Glucose (POC) 69 65 - 100 mg/dL    Performed by [facility], POC    Collection Time: 11/28/17  7:05 AM   Result Value Ref Range    Glucose (POC) 91 65 - 100 mg/dL    Performed by Reggie Hill MD  [facility]   [address] [address]  Phone - [phone]   Fax - [phone]  www. [facility]

## 2017-11-28 NOTE — CDMP QUERY
#2    Please give the clinical significance of the following lab data. NT pro-BNP: 1341    Please clarify if this patient is being treated/managed for:    =>Acute on Chronic Systolic or Diastolic CHF  =>Chronic Systolic or Diastolic CHF  =>Other Explanation of clinical findings  =>Unable to Determine (no explanation of clinical findings)      Please clarify and document your clinical opinion in the progress notes and discharge summary including the definitive and/or presumptive diagnosis, (suspected or probable), related to the above clinical findings. Please include clinical findings supporting your diagnosis.     Thank you,         Hakan Austin [facility]

## 2017-11-28 NOTE — CDMP QUERY
The diagnosis of Acute Kidney Injury has been documented for this patient. Current (2012) RIFLE criteria notes the following: Section 2: KAUSHIK Definition: \"KAUSHIK is defined as any of the following\":     Please state if KAUSHIK has been ruled out    · Increase in SCr by (>/=) 0.3 mg/dl within 48 hours; or   · Increase in SCr to (>/=)1. 5 times baseline, which is known or presumed to have occurred within the prior 7 days; or   · Urine volume <0.5 ml/kg/h for 6 hours. The record reflects:   Current admission labs:   GFR >60      BUN: 11      Creatinine: 0.64      Based on the noted clinical findings, the diagnosis of acute kidney failure may be challenged by an external reviewer.       Thank you,         Leela Worthy [address] [place] [address]

## 2017-11-29 ENCOUNTER — APPOINTMENT (OUTPATIENT)
Dept: CT IMAGING | Age: 82
DRG: 700 | End: 2017-11-29
Attending: INTERNAL MEDICINE
Payer: MEDICARE

## 2017-11-29 VITALS
TEMPERATURE: 97.5 F | OXYGEN SATURATION: 96 % | DIASTOLIC BLOOD PRESSURE: 77 MMHG | SYSTOLIC BLOOD PRESSURE: 146 MMHG | BODY MASS INDEX: 24.58 KG/M2 | HEART RATE: 71 BPM | HEIGHT: 64 IN | RESPIRATION RATE: 14 BRPM | WEIGHT: 143.96 LBS

## 2017-11-29 LAB
ABO + RH BLD: NORMAL
ALBUMIN MFR UR ELPH: 63.6 %
ALBUMIN SERPL ELPH-MCNC: 1.4 G/DL (ref 2.9–4.4)
ALBUMIN SERPL-MCNC: 1.3 G/DL (ref 3.5–5)
ALBUMIN/GLOB SERPL: 0.5 {RATIO} (ref 0.7–1.7)
ALPHA1 GLOB MFR UR ELPH: 11.6 %
ALPHA1 GLOB SERPL ELPH-MCNC: 0.2 G/DL (ref 0–0.4)
ALPHA2 GLOB 24H MFR UR ELPH: 7 %
ALPHA2 GLOB SERPL ELPH-MCNC: 1.1 G/DL (ref 0.4–1)
ANA SER QL: NEGATIVE
ANION GAP SERPL CALC-SCNC: 7 MMOL/L (ref 5–15)
B-GLOBULIN 24H MFR UR ELPH: 12.7 %
B-GLOBULIN SERPL ELPH-MCNC: 0.9 G/DL (ref 0.7–1.3)
BLOOD GROUP ANTIBODIES SERPL: NORMAL
BUN SERPL-MCNC: 12 MG/DL (ref 6–20)
BUN/CREAT SERPL: 13 (ref 12–20)
CALCIUM SERPL-MCNC: 7.9 MG/DL (ref 8.5–10.1)
CHLORIDE SERPL-SCNC: 108 MMOL/L (ref 97–108)
CO2 SERPL-SCNC: 27 MMOL/L (ref 21–32)
CREAT SERPL-MCNC: 0.89 MG/DL (ref 0.55–1.02)
ERYTHROCYTE [DISTWIDTH] IN BLOOD BY AUTOMATED COUNT: 14 % (ref 11.5–14.5)
GAMMA GLOB 24H MFR UR ELPH: 5 %
GAMMA GLOB SERPL ELPH-MCNC: 0.4 G/DL (ref 0.4–1.8)
GLOBULIN SER CALC-MCNC: 2.6 G/DL (ref 2.2–3.9)
GLUCOSE BLD STRIP.AUTO-MCNC: 104 MG/DL (ref 65–100)
GLUCOSE BLD STRIP.AUTO-MCNC: 113 MG/DL (ref 65–100)
GLUCOSE BLD STRIP.AUTO-MCNC: 52 MG/DL (ref 65–100)
GLUCOSE BLD STRIP.AUTO-MCNC: 59 MG/DL (ref 65–100)
GLUCOSE BLD STRIP.AUTO-MCNC: 70 MG/DL (ref 65–100)
GLUCOSE BLD STRIP.AUTO-MCNC: 81 MG/DL (ref 65–100)
GLUCOSE BLD STRIP.AUTO-MCNC: 88 MG/DL (ref 65–100)
GLUCOSE SERPL-MCNC: 103 MG/DL (ref 65–100)
HAV IGM SER QL: NONREACTIVE
HBV CORE IGM SER QL: NONREACTIVE
HBV SURFACE AG SER QL: <0.1 INDEX
HBV SURFACE AG SER QL: NEGATIVE
HCT VFR BLD AUTO: 32.6 % (ref 35–47)
HCT VFR BLD AUTO: 35.4 % (ref 35–47)
HCV AB SERPL QL IA: NONREACTIVE
HCV COMMENT,HCGAC: NORMAL
HGB BLD-MCNC: 10.3 G/DL (ref 11.5–16)
HGB BLD-MCNC: 11.2 G/DL (ref 11.5–16)
HIV 1+2 AB+HIV1 P24 AG SERPL QL IA: NONREACTIVE
HIV12 RESULT COMMENT, HHIVC: NORMAL
IGA SERPL-MCNC: 201 MG/DL (ref 64–422)
IGG SERPL-MCNC: 399 MG/DL (ref 700–1600)
IGM SERPL-MCNC: 78 MG/DL (ref 26–217)
M PROTEIN MFR UR ELPH: NORMAL %
M PROTEIN SERPL ELPH-MCNC: ABNORMAL G/DL
MCH RBC QN AUTO: 31.4 PG (ref 26–34)
MCHC RBC AUTO-ENTMCNC: 31.6 G/DL (ref 30–36.5)
MCV RBC AUTO: 99.4 FL (ref 80–99)
PHOSPHATE SERPL-MCNC: 3.3 MG/DL (ref 2.6–4.7)
PLATELET # BLD AUTO: 177 K/UL (ref 150–400)
PLEASE NOTE:, 133800: NORMAL
POTASSIUM SERPL-SCNC: 4.3 MMOL/L (ref 3.5–5.1)
PROT PATTERN SERPL IFE-IMP: ABNORMAL
PROT SERPL-MCNC: 4 G/DL (ref 6–8.5)
PROT UR-MCNC: 315.4 MG/DL
RBC # BLD AUTO: 3.28 M/UL (ref 3.8–5.2)
SERVICE CMNT-IMP: ABNORMAL
SERVICE CMNT-IMP: NORMAL
SODIUM SERPL-SCNC: 142 MMOL/L (ref 136–145)
SP1: NORMAL
SP2: NORMAL
SP3: NORMAL
SPECIMEN EXP DATE BLD: NORMAL
WBC # BLD AUTO: 5.3 K/UL (ref 3.6–11)

## 2017-11-29 PROCEDURE — 74011250636 HC RX REV CODE- 250/636: Performed by: RADIOLOGY

## 2017-11-29 PROCEDURE — 74011250636 HC RX REV CODE- 250/636: Performed by: INTERNAL MEDICINE

## 2017-11-29 PROCEDURE — 80069 RENAL FUNCTION PANEL: CPT | Performed by: HOSPITALIST

## 2017-11-29 PROCEDURE — 82962 GLUCOSE BLOOD TEST: CPT

## 2017-11-29 PROCEDURE — 77012 CT SCAN FOR NEEDLE BIOPSY: CPT

## 2017-11-29 PROCEDURE — 85018 HEMOGLOBIN: CPT | Performed by: INTERNAL MEDICINE

## 2017-11-29 PROCEDURE — 85027 COMPLETE CBC AUTOMATED: CPT | Performed by: HOSPITALIST

## 2017-11-29 PROCEDURE — P9047 ALBUMIN (HUMAN), 25%, 50ML: HCPCS | Performed by: HOSPITALIST

## 2017-11-29 PROCEDURE — 86900 BLOOD TYPING SEROLOGIC ABO: CPT | Performed by: HOSPITALIST

## 2017-11-29 PROCEDURE — 74011250637 HC RX REV CODE- 250/637: Performed by: FAMILY MEDICINE

## 2017-11-29 PROCEDURE — 74011250636 HC RX REV CODE- 250/636: Performed by: HOSPITALIST

## 2017-11-29 PROCEDURE — 36415 COLL VENOUS BLD VENIPUNCTURE: CPT | Performed by: HOSPITALIST

## 2017-11-29 PROCEDURE — 0TB13ZX EXCISION OF LEFT KIDNEY, PERCUTANEOUS APPROACH, DIAGNOSTIC: ICD-10-PCS | Performed by: RADIOLOGY

## 2017-11-29 PROCEDURE — 74011000250 HC RX REV CODE- 250: Performed by: FAMILY MEDICINE

## 2017-11-29 RX ORDER — MIDAZOLAM HYDROCHLORIDE 1 MG/ML
5 INJECTION, SOLUTION INTRAMUSCULAR; INTRAVENOUS
Status: DISCONTINUED | OUTPATIENT
Start: 2017-11-29 | End: 2017-11-29

## 2017-11-29 RX ORDER — FENTANYL CITRATE 50 UG/ML
200 INJECTION, SOLUTION INTRAMUSCULAR; INTRAVENOUS
Status: DISCONTINUED | OUTPATIENT
Start: 2017-11-29 | End: 2017-11-29

## 2017-11-29 RX ORDER — SODIUM CHLORIDE 9 MG/ML
25 INJECTION, SOLUTION INTRAVENOUS CONTINUOUS
Status: DISCONTINUED | OUTPATIENT
Start: 2017-11-29 | End: 2017-11-29

## 2017-11-29 RX ORDER — FUROSEMIDE 40 MG/1
40 TABLET ORAL DAILY
Qty: 30 TAB | Refills: 2 | Status: SHIPPED | OUTPATIENT
Start: 2017-11-29 | End: 2018-10-17

## 2017-11-29 RX ADMIN — Medication 16 G: at 12:24

## 2017-11-29 RX ADMIN — DEXTROSE MONOHYDRATE 25 G: 500 INJECTION PARENTERAL at 05:11

## 2017-11-29 RX ADMIN — METOPROLOL SUCCINATE 50 MG: 50 TABLET, EXTENDED RELEASE ORAL at 17:04

## 2017-11-29 RX ADMIN — SODIUM CHLORIDE 25 ML/HR: 900 INJECTION, SOLUTION INTRAVENOUS at 09:39

## 2017-11-29 RX ADMIN — ALBUMIN (HUMAN) 25 G: 0.25 INJECTION, SOLUTION INTRAVENOUS at 10:34

## 2017-11-29 RX ADMIN — Medication 10 ML: at 05:12

## 2017-11-29 RX ADMIN — DEXTROSE MONOHYDRATE 25 G: 500 INJECTION PARENTERAL at 12:55

## 2017-11-29 RX ADMIN — MIDAZOLAM HYDROCHLORIDE 1 MG: 1 INJECTION, SOLUTION INTRAMUSCULAR; INTRAVENOUS at 09:22

## 2017-11-29 RX ADMIN — FENTANYL CITRATE 25 MCG: 50 INJECTION, SOLUTION INTRAMUSCULAR; INTRAVENOUS at 09:40

## 2017-11-29 RX ADMIN — MIDAZOLAM HYDROCHLORIDE 0.5 MG: 1 INJECTION, SOLUTION INTRAMUSCULAR; INTRAVENOUS at 10:03

## 2017-11-29 RX ADMIN — FUROSEMIDE 20 MG: 10 INJECTION, SOLUTION INTRAMUSCULAR; INTRAVENOUS at 12:56

## 2017-11-29 RX ADMIN — FENTANYL CITRATE 25 MCG: 50 INJECTION, SOLUTION INTRAMUSCULAR; INTRAVENOUS at 10:00

## 2017-11-29 RX ADMIN — MIDAZOLAM HYDROCHLORIDE 1 MG: 1 INJECTION, SOLUTION INTRAMUSCULAR; INTRAVENOUS at 09:40

## 2017-11-29 RX ADMIN — FENTANYL CITRATE 50 MCG: 50 INJECTION, SOLUTION INTRAMUSCULAR; INTRAVENOUS at 09:22

## 2017-11-29 NOTE — PROGRESS NOTES
HYPOGLYCEMIC EPISODE DOCUMENTATION    Patient with hypoglycemic episode(s) at 1218(time) on 11/29/17(date). BG value(s) pre-treatment 46    Was patient symptomatic? [] yes, [x] no  Patient was treated with the following rescue medications/treatments: [] D50                [x] Glucose tablets                [] Glucagon                [] 4oz juice                [] 6oz reg soda                [] 8oz low fat milk  BG value post-treatment: 59.   1247- Administered 1 amp D50%. 1320- ZN=103. Once BG treated and value greater than 80mg/dl, pt was provided with the following:  [] snack  [x] meal  Name of MD notified: [place]  The following orders were received: None

## 2017-11-29 NOTE — PROGRESS NOTES
Nephrology Progress Note  Brooklyn Pérez  Date of Admission : 11/27/2017    CC: Follow up for proteinuria       Assessment and Plan     Anasarca:  - from nephrotic-range proteinruia  - improving  - would d/c home on lasix 40mg po daily    Proteinuria:  - 11g/g on her PCR  - concern for CORNELIO given her recent exposure to regular NSAIDs  - unlikely abx since her symptoms predated her abx exposure  - other possibilities include Membranous, FSGS or paraprotein-related d/o  - NICHOL pending, HIV, Hep panel neg, SPEP/UPEP pending  - biopsy 11/29     HTN:  - BP stable     Anemia:  - hgb stable  - repeat H&H 6 hrs post biopsy      OK for d/c if her post biopsy hgb is stable. Will set her up for follow up in clinic in one week post d/c. Interval History:  Seen and examined post biopsy. Feeling ok. Edema better today. No cp, sob, n/v/d reported. Current Medications: all current  Medications have been eviewed in EPIC  Review of Systems: Pertinent items are noted in HPI.     Objective:  Vitals:    Vitals:    11/29/17 1115 11/29/17 1135 11/29/17 1153 11/29/17 1222   BP: 130/53 134/55 135/58 124/60   Pulse: (!) 54 (!) 57 61 62   Resp: 13 12 12 12   Temp:    97.3 °F (36.3 °C)   SpO2: 100% 98% 100% 99%   Weight:       Height:         Intake and Output:     11/27 1901 - 11/29 0700  In: 480 [P.O.:480]  Out: 3400 [Urine:3400]    Physical Examination:   General: NAD,Conversant   Neck:  Supple, no mass  Resp:  Lungs CTA B/L, no wheezing , normal respiratory effort  CV:  RRR,  no murmur or rub, 1-2 + LE edema  GI:  Soft, NT, + Bowel sounds, no hepatosplenomegaly  Neurologic:  Non focal  Psych:             AAO x 3 appropriate affect   Skin:  No Rash  :  Layton in place    []    High complexity decision making was performed  []    Patient is at high-risk of decompensation with multiple organ involvement    Lab Data Personally Reviewed: I have reviewed all the pertinent labs, microbiology data and radiology studies during assessment.     Recent Labs      11/29/17   0110  11/28/17   1203  11/28/17   0336  11/27/17   1832  11/27/17   1326   NA  142   --   143   --   140   K  4.3   --   4.3   --   4.5   CL  108   --   111*   --   110*   CO2  27   --   25   --   25   GLU  103*   --   81   --   57*   BUN  12   --   8   --   11   CREA  0.89   --   0.63   --   0.64   CA  7.9*   --   8.0*   --   8.5   MG   --    --    --   2.1  2.3   PHOS  3.3   --   3.1  2.9   --    ALB  1.3*   --   1.1*   --   0.9*   SGOT   --    --    --    --   28   ALT   --    --    --    --   9*   INR   --   1.0   --    --    --      Recent Labs      11/29/17   0110  11/28/17   0336  11/27/17   1326   WBC  5.3  4.6  5.4   HGB  10.3*  10.7*  12.3   HCT  32.6*  33.4*  38.1   PLT  177  190  75*     Lab Results   Component Value Date/Time    Specimen Description: URINE 06/12/2012 04:08 PM     Lab Results   Component Value Date/Time    Culture result: NO SIGNIFICANT GROWTH 06/12/2012 04:08 PM     Recent Results (from the past 24 hour(s))   GLUCOSE, POC    Collection Time: 11/28/17  3:29 PM   Result Value Ref Range    Glucose (POC) 80 65 - 100 mg/dL    Performed by [address] [place] [address], POC    Collection Time: 11/28/17  6:42 PM   Result Value Ref Range    Glucose (POC) 148 (H) 65 - 100 mg/dL    Performed by Milvia Mederos, POC    Collection Time: 11/28/17  9:32 PM   Result Value Ref Range    Glucose (POC) 69 65 - 100 mg/dL    Performed by Chelo Bhatti, POC    Collection Time: 11/28/17 10:00 PM   Result Value Ref Range    Glucose (POC) 75 65 - 100 mg/dL    Performed by Chelo Bhatti, POC    Collection Time: 11/28/17 10:34 PM   Result Value Ref Range    Glucose (POC) 65 65 - 100 mg/dL    Performed by Chelo Bhatti, POC    Collection Time: 11/28/17 11:04 PM   Result Value Ref Range    Glucose (POC) 128 (H) 65 - 100 mg/dL    Performed by Zahra Noble    CBC W/O DIFF    Collection Time: 11/29/17  1:10 AM   Result Value Ref Range    WBC 5.3 3.6 - 11.0 K/uL    RBC 3.28 (L) 3.80 - 5.20 M/uL    HGB 10.3 (L) 11.5 - 16.0 g/dL    HCT 32.6 (L) 35.0 - 47.0 %    MCV 99.4 (H) 80.0 - 99.0 FL    MCH 31.4 26.0 - 34.0 PG    MCHC 31.6 30.0 - 36.5 g/dL    RDW 14.0 11.5 - 14.5 %    PLATELET 625 299 - 170 K/uL   RENAL FUNCTION PANEL    Collection Time: 11/29/17  1:10 AM   Result Value Ref Range    Sodium 142 136 - 145 mmol/L    Potassium 4.3 3.5 - 5.1 mmol/L    Chloride 108 97 - 108 mmol/L    CO2 27 21 - 32 mmol/L    Anion gap 7 5 - 15 mmol/L    Glucose 103 (H) 65 - 100 mg/dL    BUN 12 6 - 20 MG/DL    Creatinine 0.89 0.55 - 1.02 MG/DL    BUN/Creatinine ratio 13 12 - 20      GFR est AA >60 >60 ml/min/1.73m2    GFR est non-AA >60 >60 ml/min/1.73m2    Calcium 7.9 (L) 8.5 - 10.1 MG/DL    Phosphorus 3.3 2.6 - 4.7 MG/DL    Albumin 1.3 (L) 3.5 - 5.0 g/dL   TYPE & SCREEN    Collection Time: 11/29/17  1:10 AM   Result Value Ref Range    Crossmatch Expiration 12/02/2017     ABO/Rh(D) AB POSITIVE     Antibody screen NEG    GLUCOSE, POC    Collection Time: 11/29/17  2:15 AM   Result Value Ref Range    Glucose (POC) 81 65 - 100 mg/dL    Performed by Kylee Landin, POC    Collection Time: 11/29/17  5:06 AM   Result Value Ref Range    Glucose (POC) 70 65 - 100 mg/dL    Performed by Rosalee Nur, POC    Collection Time: 11/29/17  5:37 AM   Result Value Ref Range    Glucose (POC) 104 (H) 65 - 100 mg/dL    Performed by Kylee Landin, POC    Collection Time: 11/29/17 12:18 PM   Result Value Ref Range    Glucose (POC) 52 (L) 65 - 100 mg/dL    Performed by Shahla Car    GLUCOSE, POC    Collection Time: 11/29/17 12:47 PM   Result Value Ref Range    Glucose (POC) 59 (L) 65 - 100 mg/dL    Performed by Shahla Car    GLUCOSE, POC    Collection Time: 11/29/17  1:20 PM   Result Value Ref Range    Glucose (POC) 113 (H) 65 - 100 mg/dL    Performed by Eloisa White MD  [facility] Neph[facility] [address] [place] [id number] [facility] [address] [place] [address]  Phone - [phone]   Fax - [phone]  [email or web address]

## 2017-11-29 NOTE — PROGRESS NOTES
HYPOGLYCEMIC EPISODE DOCUMENTATION    Patient with hypoglycemic episode(s) at 2132(time) on 11/28(date). BG value(s) pre-treatment 71    Was patient symptomatic? [] yes, [x] no  Patient was treated with the following rescue medications/treatments: [] D50                [x] Glucose tablets                [] Glucagon                [] 4oz juice                [] 6oz reg soda                [] 8oz low fat milk  BG value post-treatment: 75  HYPOGLYCEMIC EPISODE DOCUMENTATION    Patient with hypoglycemic episode(s) at 2200(time) on 11/28(date). BG value(s) pre-treatment 76    Was patient symptomatic? [] yes, [x] no  Patient was treated with the following rescue medications/treatments: [] D50                [x] Glucose tablets                [] Glucagon                [] 4oz juice                [] 6oz reg soda                [] 8oz low fat milk  BG value post-treatment: 65  HYPOGLYCEMIC EPISODE DOCUMENTATION    Patient with hypoglycemic episode(s) at 2234(time) on 11/28(date). BG value(s) pre-treatment 72    Was patient symptomatic?  [] yes, [x] no  Patient was treated with the following rescue medications/treatments: [x] D50                [] Glucose tablets                [] Glucagon                [] 4oz juice                [] 6oz reg soda                [] 8oz low fat milk  BG value post-treatment: 128  Once BG treated and value greater than 80mg/dl, pt was provided with the following:  [x] snack  [] meal  Name of MD notified: Dr. Nitesh Cohen  The following orders were received: No new orders, continue with current regimen

## 2017-11-29 NOTE — PROGRESS NOTES
Bedside and Verbal shift change report given to [address] [place] [address] 20 (oncoming nurse) by Ramonita Healy (offgoing nurse). Report included the following information SBAR, Kardex, Intake/Output, MAR and Recent Results.

## 2017-11-29 NOTE — PROGRESS NOTES
Bedside shift change report given to Pawel Maurer  (oncoming nurse) by Franco Smith (offgoing nurse). Report included the following information SBAR, Kardex, ED Summary, Intake/Output and MAR.

## 2017-11-29 NOTE — PROGRESS NOTES
Bedside shift change report given to Tay Baron RN (oncoming nurse) by Jeffrey Porras RN (offgoing nurse). Report included the following information SBAR, Kardex, Intake/Output, MAR and Recent Results.

## 2017-11-29 NOTE — ROUTINE PROCESS
TRANSFER - OUT REPORT:    Verbal report given to Susie Monroe, unit RN on Natalia Xie  being transferred to [facility] for routine progression of care       Report consisted of patients Situation, Background, Assessment and   Recommendations(SBAR). Information from the following report(s) SBAR was reviewed with the receiving nurse. Lines:   Peripheral IV 11/29/17 Left Wrist (Active)   Site Assessment Clean, dry, & intact 11/29/2017  1:26 AM   Phlebitis Assessment 0 11/29/2017  1:26 AM   Infiltration Assessment 0 11/29/2017  1:26 AM   Dressing Status Clean, dry, & intact 11/29/2017  1:26 AM   Dressing Type Transparent 11/29/2017  1:26 AM   Hub Color/Line Status Infusing 11/29/2017  1:26 AM   Action Taken Open ports on tubing capped 11/29/2017  1:26 AM   Alcohol Cap Used Yes 11/29/2017  1:26 AM        Opportunity for questions and clarification was provided.       Patient transported with:   angio RN on stretcher back to unit at 1230; left flank dsg dry and intact

## 2017-11-29 NOTE — DISCHARGE SUMMARY
Discharge Summary     Patient:  Venus Golden       MRN: [id number]       YOB: 1930       Age: 80 y.o. Date of admission:  11/27/2017    Date of discharge:  11/29/2017    Primary care provider: Dr. Kiran Lemons MD    Admitting provider:  Arlet Murphy MD    Discharging provider:  Zuleima Mosqueda [address].: [phone]. If unavailable, call [phone] and ask the  to page the triage hospitalist.    Consultations  · [address]  · IP CONSULT TO HOSPITALIST    Procedures  · * No surgery found *    Discharge destination: Home. The patient is stable for discharge. Admission diagnosis  · [facility]    Current Discharge Medication List      START taking these medications    Details   furosemide (LASIX) 40 mg tablet Take 1 Tab by mouth daily. Qty: 30 Tab, Refills: 2         CONTINUE these medications which have NOT CHANGED    Details   fluticasone (FLONASE) 50 mcg/actuation nasal spray 1 [place] by Both Nostrils route daily. cholecalciferol (VITAMIN D3) 1,000 unit tablet Take 1,000 Units by mouth daily as needed. metoprolol-XL (TOPROL XL) 50 mg XL tablet Take 50 mg by mouth daily. STOP taking these medications       amoxicillin-clavulanate (AUGMENTIN) 875-125 mg per tablet Comments:   Reason for Stopping:         trimethoprim-sulfamethoxazole (BACTRIM DS, SEPTRA DS) 160-800 mg per tablet Comments:   Reason for Stopping:         atorvastatin (LIPITOR) 10 mg tablet Comments:   Reason for Stopping:                Follow-up Information     Follow up With Details Comments [address], [place] Schedule an appointment as soon as possible for a visit in 1 week For follow up of kidney disease [facility]  [phone]            Final discharge diagnoses and brief hospital course  Please also refer to the admission H&P for details on the presenting problem. The patient is an 58-year-old female with past medical history of cholecystitis, history of hypertension, allergies, who presents to the hospital with anasarca. Anasarca with hypoalbuminemia likely due to nephrotic syndrome (POA) - due to NSAIDs? - Renal US 11/27 unremarkable  - LE doppler 11/28 without DVT  - Continue lasix, received albumin  - Labs pending  - Renal biopsy 11/29 - results pending  - Nephrology consulted - follow up with Dr. Nena Mantilla     Thrombocytopenia (POA) - resolved without intervention     Hypertension (chronic) - BP stable, continue home metoprolol     FOLLOW-UP CARE RECOMMENDATIONS:     PENDING TEST RESULTS:  At the time of your discharge the following test results are still pending: Kidney biopsy  Please make sure you review these results with your outpatient follow-up provider(s). SYMPTOMS to watch for: flank pain, bleeding, weakness. DIET/what to eat:  Cardiac Diet    ACTIVITY:  Activity as tolerated    OTHER medication instructions:  Please do not take NSAIDs (ibuprofen, motrin, aspirin, Alleve, naprosyn, etc.). These medicines can hurt your kidneys. If you have pain or fever, you can take acetaminophen (Tylenol). What to do if new or unexpected symptoms occur? If you experience any of the above symptoms (or should other concerns or questions arise after discharge) please call your primary care physician. Return to the emergency room if you cannot get hold of your doctor. · It is very important that you keep your follow-up appointment(s). · Please bring discharge papers, medication list (and/or medication bottles) to your follow-up appointments for review by your outpatient provider(s). · Please check the list of medications and be sure it includes every medication (even non-prescription medications) that your provider wants you to take.     · It is important that you take the medication exactly as they are prescribed. · Keep your medication in the bottles provided by the pharmacist and keep a list of the medication names, dosages, and times to be taken in your wallet. · Do not take other medications without consulting your doctor. · If you have any questions about your medications or other instructions, please talk to your nurse or care provider before you leave the hospital.    Physical examination at discharge  Visit Vitals    /60 (BP 1 Location: Left arm, BP Patient Position: At rest)    Pulse 62    Temp 97.3 °F (36.3 °C)    Resp 12    Ht 5' 4\" (1.626 m)    Wt 64.3 kg (141 lb 12.1 oz)    SpO2 99%    BMI 24.33 kg/m2     Gen - NAD  HEENT - MMM  Neck - supple, full ROM  CV - RRR, no MRG  Resp - lungs CTA b/l, no wheezing, normal WOB  GI - abdomen S, NT, ND, +BS. No hepatosplenomegaly   - no CVA tenderness, bladder non-palpable in lower abdomen  MSK - normal tone and bulk, trace edema in bilateral upper and lower extremities  Neuro - A&O, no focal deficits  Psych - calm and cooperative with normal affect    Pertinent imaging studies:    CXR 11/27/17  FINDINGS: PA and lateral radiographs of the chest demonstrate small left pleural  effusion. There may also be a tiny right pleural effusion. Sudie Mar Heart size normal.  Atherosclerotic change of the aorta is noted. .  Degenerative changes of the  thoracic spine noted. IMPRESSION: Small left pleural effusion. US renal 11/27/17  FINDINGS:  Images are partially related secondary to diffuse edema. The kidneys have normal echogenicity with no mass, stone or hydronephrosis. The  right kidney measures 7.1 cm and the left kidney measures 9.9 cm in length. The aorta tapers normally. The proximal iliac arteries are normal.  The IVC is  well-visualized secondary to bowel gas and body habitus. No retroperitoneal mass  is identified.   The urinary bladder is normal.  IMPRESSION: Unremarkable renal ultrasound examination.     Duplex LE doppler 11/28/17  FINDINGS:       Right: The common femoral, deep femoral, femoral, popliteal,  posterior tibial, peroneal, and great saphenous are patent and without   evidence of thrombus;  each is fully compressible and there is no  narrowing of the flow channel on color Doppler imaging. Phasic flow  is observed in the common femoral vein. Left:   The common femoral, deep femoral, femoral, popliteal,  posterior tibial, peroneal, and great saphenous are patent and without   evidence of thrombus;  each is fully compressible and there is no  narrowing of the flow channel on color Doppler imaging. Phasic flow  is observed in the common femoral vein.   IMPRESSION:  No evidence of right or left lower extremity vein  Thrombosis. CT guided renal biopsy 11/29/17  FINDINGS:   The procedure including the risk of bleeding and infection was explained to the  patient and verbal and written informed consent was obtained. A timeout was  taken prior to the procedure. The patient was placed in the CT scanner in the prone position, images were  obtained and a site was localized for biopsy of the left kidney The skin was  marked , prepped and draped utilizing maximal sterile barrier technique. Next,the area was locally anesthetized with 1% lidocaine . An 18-gauge system was utilized. The introducer needle was advanced through the  renal cortex and through this three 18-gauge core biopsy specimens were obtained  and given to the pathologist to determine adequacy. Gelfoam embolic material was  utilized to occlude the needle tract. Touch preparations were made. The needle  was removed and a bandage applied. The patient was monitored by the radiology nurse throughout the procedure with  pulse oximetry and EKG monitoring after intravenous administration of Versed and  fentanyl for conscious sedation.  The patient tolerated the procedure well  without immediate complication and will be recovered in the radiology holding  unit. : Sadie Silver. Alex Grider MD.  PROCEDURE: CT-guided renal biopsy. PREPROCEDURAL DIAGNOSIS: Proteinuria  POSTPROCEDURAL DIAGNOSIS: Proteinuria  ESTIMATED BLOOD LOSS: None. SPECIMENS REMOVED: 6 18-gauge core biopsy specimens. COMPLICATIONS: None. IMPRESSION:   Successful CT guided renal biopsy.    Pathology pending. Recent Labs      11/29/17   0110  11/28/17   0336  11/27/17   1326   WBC  5.3  4.6  5.4   HGB  10.3*  10.7*  12.3   HCT  32.6*  33.4*  38.1   PLT  177  190  75*     Recent Labs      11/29/17   0110  11/28/17   0336  11/27/17   1832  11/27/17   1326   NA  142  143   --   140   K  4.3  4.3   --   4.5   CL  108  111*   --   110*   CO2  27  25   --   25   BUN  12  8   --   11   CREA  0.89  0.63   --   0.64   GLU  103*  81   --   57*   CA  7.9*  8.0*   --   8.5   MG   --    --   2.1  2.3   PHOS  3.3  3.1  2.9   --      Recent Labs      11/29/17   0110  11/28/17   0336  11/27/17   1326   SGOT   --    --   28   AP   --    --   89   TP   --    --   4.6*   ALB  1.3*  1.1*  0.9*   GLOB   --    --   3.7   LPSE   --    --   220     Recent Labs      11/28/17   1203   INR  1.0   PTP  10.5      No results for input(s): FE, TIBC, PSAT, FERR in the last 72 hours. No results for input(s): PH, PCO2, PO2 in the last 72 hours. No results for input(s): CPK, CKMB in the last 72 hours.     No lab exists for component: TROPONINI  No components found for: [place]    Chronic Diagnoses:    Problem List as of 11/29/2017  Date Reviewed: 11/27/2017          Codes Class Noted - Resolved    ARF (acute renal failure) ([facility] ICD-10-CM: N17.9  ICD-9-CM: 584.9  11/27/2017 - Present        * (Principal)Proteinuria ICD-10-CM: R80.9  ICD-9-CM: 791.0  11/27/2017 - Present        Dizziness ICD-10-CM: R42  ICD-9-CM: 780.4  1/20/2012 - Present        Cholelithiasis with cholecystitis ICD-10-CM: K80.10  ICD-9-CM: 574.10  11/14/2010 - Present              Time spent on discharge related activities today greater than 30 minutes.       Signed:  Siri Urbina MD                 Hospitalist, Internal Medicine      Cc: Delgado Stroud MD

## 2017-11-29 NOTE — PROGRESS NOTES
HYPOGLYCEMIC EPISODE DOCUMENTATION    Patient with hypoglycemic episode(s) at 0506(time) on 11/29(date). BG value(s) pre-treatment 79    Was patient symptomatic? [] yes, [x] no  Patient was treated with the following rescue medications/treatments: [x] D50                [] Glucose tablets                [] Glucagon                [] 4oz juice                [] 6oz reg soda                [] 8oz low fat milk  BG value post-treatment: 104  Once BG treated and value greater than 80mg/dl, pt was provided with the following:  [] snack  [] meal  Name of MD notified:   The following orders were received:

## 2017-11-29 NOTE — DISCHARGE INSTRUCTIONS
Please bring this form with you to show your primary care provider at your follow-up appointment. Primary care provider:  Dr. Efrain Gordon MD    Discharging provider:  Donny Landry MD    You have been admitted to the hospital with the following diagnoses:  · Swelling (anasarca)  · Protein in the urine    FOLLOW-UP CARE RECOMMENDATIONS:    APPOINTMENTS:  Follow-up Information     Follow up With Details Comments [address], [place] Schedule an appointment as soon as possible for a visit in 1 week For follow up of kidney disease [facility]  [phone]           PENDING TEST RESULTS:  At the time of your discharge the following test results are still pending: Kidney biopsy  Please make sure you review these results with your outpatient follow-up provider(s). SYMPTOMS to watch for: flank pain, bleeding, weakness. DIET/what to eat:  Cardiac Diet    ACTIVITY:  Activity as tolerated    OTHER medication instructions:  Please do not take NSAIDs (ibuprofen, motrin, aspirin, Alleve, naprosyn, etc.). These medicines can hurt your kidneys. If you have pain or fever, you can take acetaminophen (Tylenol). What to do if new or unexpected symptoms occur? If you experience any of the above symptoms (or should other concerns or questions arise after discharge) please call your primary care physician. Return to the emergency room if you cannot get hold of your doctor. · It is very important that you keep your follow-up appointment(s). · Please bring discharge papers, medication list (and/or medication bottles) to your follow-up appointments for review by your outpatient provider(s). · Please check the list of medications and be sure it includes every medication (even non-prescription medications) that your provider wants you to take. · It is important that you take the medication exactly as they are prescribed.    · Keep your medication in the bottles provided by the pharmacist and keep a list of the medication names, dosages, and times to be taken in your wallet. · Do not take other medications without consulting your doctor. · If you have any questions about your medications or other instructions, please talk to your nurse or care provider before you leave the hospital.    I understand that if any problems occur once I am at home I am to contact my physician. These instructions were explained to me and I had the opportunity to ask questions.

## 2017-11-29 NOTE — PROGRESS NOTES
Problem: Falls - Risk of  Goal: *Absence of Falls  Document [place] Fall Risk and appropriate interventions in the flowsheet.    Outcome: Progressing Towards Goal  Fall Risk Interventions:  Mobility Interventions: Patient to call before getting OOB         Medication Interventions: Teach patient to arise slowly    Elimination Interventions: Patient to call for help with toileting needs    History of Falls Interventions: Door open when patient unattended, Room close to nurse's station

## 2018-10-17 ENCOUNTER — APPOINTMENT (OUTPATIENT)
Dept: GENERAL RADIOLOGY | Age: 83
DRG: 545 | End: 2018-10-17
Attending: EMERGENCY MEDICINE
Payer: MEDICARE

## 2018-10-17 ENCOUNTER — HOSPITAL ENCOUNTER (INPATIENT)
Age: 83
LOS: 7 days | Discharge: NURSING, REHAB OR LONG-TERM CARE | DRG: 545 | End: 2018-10-24
Attending: EMERGENCY MEDICINE | Admitting: FAMILY MEDICINE
Payer: MEDICARE

## 2018-10-17 ENCOUNTER — APPOINTMENT (OUTPATIENT)
Dept: CT IMAGING | Age: 83
DRG: 545 | End: 2018-10-17
Attending: FAMILY MEDICINE
Payer: MEDICARE

## 2018-10-17 ENCOUNTER — APPOINTMENT (OUTPATIENT)
Dept: CT IMAGING | Age: 83
DRG: 545 | End: 2018-10-17
Attending: EMERGENCY MEDICINE
Payer: MEDICARE

## 2018-10-17 DIAGNOSIS — D64.9 ANEMIA, UNSPECIFIED TYPE: ICD-10-CM

## 2018-10-17 DIAGNOSIS — R41.82 ALTERED MENTAL STATUS, UNSPECIFIED ALTERED MENTAL STATUS TYPE: Primary | ICD-10-CM

## 2018-10-17 DIAGNOSIS — R11.15 NON-INTRACTABLE CYCLICAL VOMITING WITH NAUSEA: ICD-10-CM

## 2018-10-17 DIAGNOSIS — Z71.89 GOALS OF CARE, COUNSELING/DISCUSSION: ICD-10-CM

## 2018-10-17 DIAGNOSIS — R60.0 BILATERAL LEG EDEMA: ICD-10-CM

## 2018-10-17 DIAGNOSIS — R53.81 DEBILITY: ICD-10-CM

## 2018-10-17 DIAGNOSIS — R41.89 COGNITIVE IMPAIRMENT: ICD-10-CM

## 2018-10-17 DIAGNOSIS — N17.9 ACUTE RENAL FAILURE, UNSPECIFIED ACUTE RENAL FAILURE TYPE (HCC): ICD-10-CM

## 2018-10-17 DIAGNOSIS — K59.00 CONSTIPATION, UNSPECIFIED CONSTIPATION TYPE: ICD-10-CM

## 2018-10-17 PROBLEM — R26.81 UNSTEADY GAIT: Status: ACTIVE | Noted: 2018-10-17

## 2018-10-17 PROBLEM — E86.0 DEHYDRATION: Status: ACTIVE | Noted: 2018-10-17

## 2018-10-17 LAB
ABO + RH BLD: NORMAL
ALBUMIN SERPL-MCNC: 0.6 G/DL (ref 3.5–5)
ALBUMIN/GLOB SERPL: 0.1 {RATIO} (ref 1.1–2.2)
ALP SERPL-CCNC: 99 U/L (ref 45–117)
ALT SERPL-CCNC: 11 U/L (ref 12–78)
AMMONIA PLAS-SCNC: 27 UMOL/L
ANION GAP SERPL CALC-SCNC: 8 MMOL/L (ref 5–15)
APAP SERPL-MCNC: 4 UG/ML (ref 10–30)
APPEARANCE UR: ABNORMAL
AST SERPL-CCNC: 38 U/L (ref 15–37)
ATRIAL RATE: 94 BPM
BACTERIA URNS QL MICRO: NEGATIVE /HPF
BASOPHILS # BLD: 0 K/UL (ref 0–0.1)
BASOPHILS NFR BLD: 1 % (ref 0–1)
BILIRUB SERPL-MCNC: 0.2 MG/DL (ref 0.2–1)
BILIRUB UR QL: NEGATIVE
BLOOD GROUP ANTIBODIES SERPL: NORMAL
BNP SERPL-MCNC: 3343 PG/ML (ref 0–450)
BUN SERPL-MCNC: 31 MG/DL (ref 6–20)
BUN/CREAT SERPL: 27 (ref 12–20)
CALCIUM SERPL-MCNC: 7.7 MG/DL (ref 8.5–10.1)
CALCULATED P AXIS, ECG09: 65 DEGREES
CALCULATED R AXIS, ECG10: -18 DEGREES
CALCULATED T AXIS, ECG11: -4 DEGREES
CHLORIDE SERPL-SCNC: 108 MMOL/L (ref 97–108)
CK MB CFR SERPL CALC: 1.8 % (ref 0–2.5)
CK MB SERPL-MCNC: 4.5 NG/ML (ref 5–25)
CK SERPL-CCNC: 249 U/L (ref 26–192)
CO2 SERPL-SCNC: 26 MMOL/L (ref 21–32)
COLOR UR: ABNORMAL
COMMENT, HOLDF: NORMAL
CREAT SERPL-MCNC: 1.14 MG/DL (ref 0.55–1.02)
DIAGNOSIS, 93000: NORMAL
DIFFERENTIAL METHOD BLD: ABNORMAL
EOSINOPHIL # BLD: 0.1 K/UL (ref 0–0.4)
EOSINOPHIL NFR BLD: 1 % (ref 0–7)
EPITH CASTS URNS QL MICRO: ABNORMAL /LPF
ERYTHROCYTE [DISTWIDTH] IN BLOOD BY AUTOMATED COUNT: 14.2 % (ref 11.5–14.5)
ETHANOL SERPL-MCNC: <10 MG/DL
GLOBULIN SER CALC-MCNC: 4.4 G/DL (ref 2–4)
GLUCOSE SERPL-MCNC: 102 MG/DL (ref 65–100)
GLUCOSE UR STRIP.AUTO-MCNC: NEGATIVE MG/DL
HCT VFR BLD AUTO: 30.6 % (ref 35–47)
HEMOCCULT STL QL: POSITIVE
HGB BLD-MCNC: 9.3 G/DL (ref 11.5–16)
HGB UR QL STRIP: ABNORMAL
IMM GRANULOCYTES # BLD: 0 K/UL (ref 0–0.04)
IMM GRANULOCYTES NFR BLD AUTO: 0 % (ref 0–0.5)
KETONES UR QL STRIP.AUTO: NEGATIVE MG/DL
LACTATE SERPL-SCNC: 2.3 MMOL/L (ref 0.4–2)
LEUKOCYTE ESTERASE UR QL STRIP.AUTO: NEGATIVE
LIPASE SERPL-CCNC: 290 U/L (ref 73–393)
LYMPHOCYTES # BLD: 2 K/UL (ref 0.8–3.5)
LYMPHOCYTES NFR BLD: 31 % (ref 12–49)
MCH RBC QN AUTO: 32 PG (ref 26–34)
MCHC RBC AUTO-ENTMCNC: 30.4 G/DL (ref 30–36.5)
MCV RBC AUTO: 105.2 FL (ref 80–99)
MONOCYTES # BLD: 0.3 K/UL (ref 0–1)
MONOCYTES NFR BLD: 5 % (ref 5–13)
NEUTS SEG # BLD: 3.9 K/UL (ref 1.8–8)
NEUTS SEG NFR BLD: 62 % (ref 32–75)
NITRITE UR QL STRIP.AUTO: NEGATIVE
NRBC # BLD: 0 K/UL (ref 0–0.01)
NRBC BLD-RTO: 0 PER 100 WBC
P-R INTERVAL, ECG05: 162 MS
PH UR STRIP: 5 [PH] (ref 5–8)
PLATELET # BLD AUTO: 221 K/UL (ref 150–400)
PMV BLD AUTO: 10 FL (ref 8.9–12.9)
POTASSIUM SERPL-SCNC: 4.5 MMOL/L (ref 3.5–5.1)
PREALB SERPL-MCNC: 6.1 MG/DL (ref 20–40)
PROT SERPL-MCNC: 5 G/DL (ref 6.4–8.2)
PROT UR STRIP-MCNC: >300 MG/DL
Q-T INTERVAL, ECG07: 342 MS
QRS DURATION, ECG06: 56 MS
QTC CALCULATION (BEZET), ECG08: 427 MS
RBC # BLD AUTO: 2.91 M/UL (ref 3.8–5.2)
RBC #/AREA URNS HPF: ABNORMAL /HPF (ref 0–5)
SALICYLATES SERPL-MCNC: <1.7 MG/DL (ref 2.8–20)
SAMPLES BEING HELD,HOLD: NORMAL
SODIUM SERPL-SCNC: 142 MMOL/L (ref 136–145)
SP GR UR REFRACTOMETRY: 1.03 (ref 1–1.03)
SPECIMEN EXP DATE BLD: NORMAL
TROPONIN I SERPL-MCNC: <0.05 NG/ML
TSH SERPL DL<=0.05 MIU/L-ACNC: 4.89 UIU/ML (ref 0.36–3.74)
UROBILINOGEN UR QL STRIP.AUTO: 1 EU/DL (ref 0.2–1)
VENTRICULAR RATE, ECG03: 94 BPM
WBC # BLD AUTO: 6.3 K/UL (ref 3.6–11)
WBC URNS QL MICRO: ABNORMAL /HPF (ref 0–4)

## 2018-10-17 PROCEDURE — 82550 ASSAY OF CK (CPK): CPT | Performed by: EMERGENCY MEDICINE

## 2018-10-17 PROCEDURE — 71046 X-RAY EXAM CHEST 2 VIEWS: CPT

## 2018-10-17 PROCEDURE — 86900 BLOOD TYPING SEROLOGIC ABO: CPT | Performed by: FAMILY MEDICINE

## 2018-10-17 PROCEDURE — 65660000000 HC RM CCU STEPDOWN

## 2018-10-17 PROCEDURE — 80053 COMPREHEN METABOLIC PANEL: CPT | Performed by: EMERGENCY MEDICINE

## 2018-10-17 PROCEDURE — 82272 OCCULT BLD FECES 1-3 TESTS: CPT | Performed by: FAMILY MEDICINE

## 2018-10-17 PROCEDURE — 82746 ASSAY OF FOLIC ACID SERUM: CPT | Performed by: FAMILY MEDICINE

## 2018-10-17 PROCEDURE — 74176 CT ABD & PELVIS W/O CONTRAST: CPT

## 2018-10-17 PROCEDURE — 36415 COLL VENOUS BLD VENIPUNCTURE: CPT | Performed by: FAMILY MEDICINE

## 2018-10-17 PROCEDURE — 84484 ASSAY OF TROPONIN QUANT: CPT | Performed by: FAMILY MEDICINE

## 2018-10-17 PROCEDURE — 83605 ASSAY OF LACTIC ACID: CPT | Performed by: FAMILY MEDICINE

## 2018-10-17 PROCEDURE — 83880 ASSAY OF NATRIURETIC PEPTIDE: CPT | Performed by: FAMILY MEDICINE

## 2018-10-17 PROCEDURE — 84134 ASSAY OF PREALBUMIN: CPT | Performed by: FAMILY MEDICINE

## 2018-10-17 PROCEDURE — 84443 ASSAY THYROID STIM HORMONE: CPT | Performed by: FAMILY MEDICINE

## 2018-10-17 PROCEDURE — 81001 URINALYSIS AUTO W/SCOPE: CPT | Performed by: EMERGENCY MEDICINE

## 2018-10-17 PROCEDURE — 99285 EMERGENCY DEPT VISIT HI MDM: CPT

## 2018-10-17 PROCEDURE — 82607 VITAMIN B-12: CPT | Performed by: FAMILY MEDICINE

## 2018-10-17 PROCEDURE — 93005 ELECTROCARDIOGRAM TRACING: CPT

## 2018-10-17 PROCEDURE — 85025 COMPLETE CBC W/AUTO DIFF WBC: CPT | Performed by: EMERGENCY MEDICINE

## 2018-10-17 PROCEDURE — 83690 ASSAY OF LIPASE: CPT | Performed by: FAMILY MEDICINE

## 2018-10-17 PROCEDURE — 93970 EXTREMITY STUDY: CPT

## 2018-10-17 PROCEDURE — 80307 DRUG TEST PRSMV CHEM ANLYZR: CPT | Performed by: FAMILY MEDICINE

## 2018-10-17 PROCEDURE — 82140 ASSAY OF AMMONIA: CPT | Performed by: FAMILY MEDICINE

## 2018-10-17 PROCEDURE — 70450 CT HEAD/BRAIN W/O DYE: CPT

## 2018-10-17 RX ORDER — SODIUM CHLORIDE 0.9 % (FLUSH) 0.9 %
5-10 SYRINGE (ML) INJECTION EVERY 8 HOURS
Status: DISCONTINUED | OUTPATIENT
Start: 2018-10-17 | End: 2018-10-24 | Stop reason: HOSPADM

## 2018-10-17 RX ORDER — FUROSEMIDE 80 MG/1
80 TABLET ORAL 2 TIMES DAILY
COMMUNITY
End: 2018-10-24

## 2018-10-17 RX ORDER — TORSEMIDE 20 MG/1
60 TABLET ORAL 2 TIMES DAILY
COMMUNITY
End: 2018-10-17

## 2018-10-17 RX ORDER — SODIUM CHLORIDE 0.9 % (FLUSH) 0.9 %
5-10 SYRINGE (ML) INJECTION AS NEEDED
Status: DISCONTINUED | OUTPATIENT
Start: 2018-10-17 | End: 2018-10-24 | Stop reason: HOSPADM

## 2018-10-17 NOTE — PROGRESS NOTES
Date of previous inpatient admission/ ED visit? 11/27/17-11/29/17 Inpatient Admission What brought the patient back to ED? Patient presents the ED w/ leg swelling Did patient decline recommended services during last admission/ ED visit (if yes, what)? No 
 
Has patient seen a provider since their last inpatient admission/ED visit (if yes, when)? Patient is followed by PCP and Nephrologist (Benny Chilel states  on [facility]. Is PCP and last in office in October). This writer was unsuccessful locating MD.  
 
CM Interventions: 
From previous inpatient admission/ED visit: Assessment From current inpatient admission/ED visit: Assessment SSED/CM consult received and appreciated. EMR reviewed. This writer briefly met w/patient as nursing care and testing performed (CM noted patient's unkept presentation and need to be bathed).  verbalizes living home alone \" I take my time doing my activities. \" Patient lives in 2 story home however is unable to go up 15 stairs for upstairs. When asked if appointed POA informed her and son Alexa Garcia talk and discuss things. Demographic information confirmed patient states home number is [phone] (similar to son's number) noted [phone] per patient was previous mobile number. Patty Aquino is a teacher in [employer] and visits once/week. Benny Chilel states she has medicaid as additional insurance and her sisters can provide insurance info or cards in purse. This writer declined obtaining purse since patient was receiving care. Susan Wilder and [place] are her sisters and Ramesh Wilder is her Granddaughter. Patient states sister Rojas House is at [facility] in the waiting room. Permission obtain to locate/contact sister. No visitors in the ED waiting room. Call placed to [address] [phone] introduced to self. Sister adiel had went home and in the process of returning back to ED w/ her other sister Shaggy Molina.  Additional history obtained - Confirmed son is a teacher and visits once/week to assist. Jim Vaughan has hired nurse however there was a \" falling out\" between patient and nurse. Bathroom is located updates and patient unable to walk upstairs for a bath/shower.  states she sees patient daily and for past week has had leg swelling and difficulty - \" she just needs a good bath and to get some help. \" Both sisters live [address] [place] [address] and patient lives [place] [facility]. Pack expressed [facility] would be a close facility and family can visit have had other friends receive care at location.  is en route to [facility] and available for medical team. 
 
Call placed to [facility] son/POA - introduced to role of CM. Son available to fax POA paperwork to [facility]. Jim Vaughan is the only child and has attempted in multiple ways to assist his mother however describes her as having a \"mean mouth\" and has refused and dismissed hired and skilled [facility]. According to son patient was hospitalized at [facility] in June and was placed at [facility] (20 day stay) then returned back home w/ [facility] [address]) and private help. Patient refused to let community help in. Son scheduled PCP f/u appointment after rehab stay and patient refuse to go to appointment. Jim Vaughan unable to remember name of PCP and Nephrologist. Son states has reach out to [address] and Aging Department however informed \"he cant make patient do anything she doesn't want to do. \" Son knows patient is attached to her house and he sees the importance of getting her downsized into a retired apartment. This writer expressed concern of meeting patient for brief assessment however not sure is can maintain self independently versus AL. Income according to son ~ $1300/month (SS +Pension). CM noted [facility] spouse was retired army and [employer] service. Jim Vaughan expressed does not want his mother in a NH.  Discussed options of possible AL/ independent apartment w/ hired help/ living w/ sister(s) . Patient does not have a LTC policy however has some resources of which can utilize for care if needed ( home / annuity). Provided e-mail address [email or web address] and requested list of senior facilities/ AL. Informed patient to be evaluated by Hospitalist this evening and CM will communicate w/ unit CM. Updates provided to William Guerra NP.

## 2018-10-17 NOTE — ED NOTES
1818: Patient arrives via EMS for bilateral leg swelling. Upon assessment of patient, pt appears unkept - smells of stool. Pt states that her last bowel movement and urination was today. Pt is alert and oriented x 2 NSR on the cardiac monitor at 94 BPM, 100% on RA. Blood pressure slightly elevated. Patient has generalized 4+ pitting edema. Pt cath for urine - output of 35mL. Pt changed into gown, linen changed, patient bathed. 1818: Bedside and Verbal shift change report given to Cookie Benoit RN (oncoming nurse) by Miguel Angel Chandra RN (offgoing nurse). Report included the following information SBAR, Kardex, ED Summary, Recent Results and Cardiac Rhythm NSR.

## 2018-10-17 NOTE — ED PROVIDER NOTES
HPI  
Pt arrives from home reporting that her legs are swollen. She states that she lives alone and her sister called EMS to bring her to the ED for evaluation. She denies any falls or direct injuries. She is able to recite her correct address but is uncertain of the name of the present president or the month. Denies any fever, difficulty breathing, difficulty swallowing, SOB, chest pain or abdominal pain. Denies any nausea, vomiting, urinary problems or diarrhea. Pt. Reports taking her medications that her doctor ordered but is uncertain of \"the names of the medications\". Pt's sisters report noticeable change in pt's mentation and mobility. PMH, social history and ROS are limited by pt's age and mental status. Past Medical History:  
Diagnosis Date  High cholesterol  Hypercholesterolemia  Hypertension  Other ill-defined conditions(799.89)   
 kidney stones Past Surgical History:  
Procedure Laterality Date [address]  REMOVAL GALLBLADDER  11/14/2010 Family History:  
Problem Relation Age of Onset  Heart Disease Father  Stroke Sister  Heart Disease Sister  Diabetes Sister Social History Socioeconomic History  Marital status:  Spouse name: Not on file  Number of children: Not on file  Years of education: Not on file  Highest education level: Not on file Social Needs  Financial resource strain: Not on file  Food insecurity - worry: Not on file  Food insecurity - inability: Not on file  Transportation needs - medical: Not on file  Transportation needs - non-medical: Not on file Occupational History  Not on file Tobacco Use  Smoking status: Former Smoker  Smokeless tobacco: Never Used Substance and Sexual Activity  Alcohol use: Yes Comment: occassionally  Drug use: No  
 Sexual activity: Yes  
  Partners: Male Birth control/protection: None Other Topics Concern  Not on file Social History Narrative ** Merged History Encounter ** ALLERGIES: Seafood [shellfish containing products] Review of Systems Constitutional: Positive for activity change. Cardiovascular: Positive for leg swelling. Skin:  
     Extremely poor hygiene; covered in old feces and dried urine Psychiatric/Behavioral: Positive for confusion. Vitals:  
 10/17/18 1551 BP: 127/69 Pulse: 65 Resp: 20 Temp: 97.4 °F (36.3 °C) SpO2: 98% Weight: 62.6 kg (138 lb) Height: 5' 4.5\" (1.638 m) Physical Exam  
Constitutional: She appears well-nourished. Elderly black female; lives alone HENT:  
Missing many teeth; remaining teeth are in poor repair Neck: Normal range of motion. Neck supple. Cardiovascular: Normal rate. Pulmonary/Chest: Effort normal.  
Abdominal: She exhibits distension. Abdomen is nontender and very firm to palpation Musculoskeletal: Normal range of motion. She exhibits edema. Gross lower leg edema (chronic) Skin: Skin is dry. Skin on the lower extremities is scaly, dry and filthy; covered in dried urine and feces Upper extremities are scaly and dry. Nursing note and vitals reviewed. MDM Procedures Dr. Anayeli Power examined the pt and discussed the plan of care. Pt's son was consulted and will be driving from [place] tomorrow. Senior services Tania Gaines) was consulted regarding an assisted level of care for when pt is discharged. Consult adult hospitalist (Dr. Madyson Stokes) and he will admit. 
7:56 PM 
Patient's results and plan of care have been reviewed with her and her son (phone) and sisters . Patient and/or family have verbally conveyed their understanding and agreement of the patient's signs, symptoms, diagnosis, treatment and prognosis and additionally agree to be admitted.  Manuel Reddy NP

## 2018-10-18 LAB
ALBUMIN SERPL-MCNC: 0.8 G/DL (ref 3.5–5)
ALBUMIN/GLOB SERPL: 0.2 {RATIO} (ref 1.1–2.2)
ALP SERPL-CCNC: 99 U/L (ref 45–117)
ALT SERPL-CCNC: 9 U/L (ref 12–78)
ANION GAP SERPL CALC-SCNC: 6 MMOL/L (ref 5–15)
APTT PPP: 22.8 SEC (ref 22.1–32)
AST SERPL-CCNC: 30 U/L (ref 15–37)
BASOPHILS # BLD: 0 K/UL (ref 0–0.1)
BASOPHILS NFR BLD: 0 % (ref 0–1)
BILIRUB SERPL-MCNC: 0.2 MG/DL (ref 0.2–1)
BUN SERPL-MCNC: 28 MG/DL (ref 6–20)
BUN/CREAT SERPL: 29 (ref 12–20)
CALCIUM SERPL-MCNC: 7.9 MG/DL (ref 8.5–10.1)
CHLORIDE SERPL-SCNC: 109 MMOL/L (ref 97–108)
CO2 SERPL-SCNC: 27 MMOL/L (ref 21–32)
CREAT SERPL-MCNC: 0.95 MG/DL (ref 0.55–1.02)
DIFFERENTIAL METHOD BLD: ABNORMAL
EOSINOPHIL # BLD: 0.1 K/UL (ref 0–0.4)
EOSINOPHIL NFR BLD: 1 % (ref 0–7)
ERYTHROCYTE [DISTWIDTH] IN BLOOD BY AUTOMATED COUNT: 14 % (ref 11.5–14.5)
FOLATE SERPL-MCNC: 17.3 NG/ML (ref 5–21)
GLOBULIN SER CALC-MCNC: 4 G/DL (ref 2–4)
GLUCOSE SERPL-MCNC: 80 MG/DL (ref 65–100)
HCT VFR BLD AUTO: 27.4 % (ref 35–47)
HGB BLD-MCNC: 8.6 G/DL (ref 11.5–16)
IMM GRANULOCYTES # BLD: 0 K/UL (ref 0–0.04)
IMM GRANULOCYTES NFR BLD AUTO: 0 % (ref 0–0.5)
INR PPP: 1 (ref 0.9–1.1)
LACTATE SERPL-SCNC: 0.9 MMOL/L (ref 0.4–2)
LYMPHOCYTES # BLD: 2.6 K/UL (ref 0.8–3.5)
LYMPHOCYTES NFR BLD: 31 % (ref 12–49)
MAGNESIUM SERPL-MCNC: 2.3 MG/DL (ref 1.6–2.4)
MCH RBC QN AUTO: 31.9 PG (ref 26–34)
MCHC RBC AUTO-ENTMCNC: 31.4 G/DL (ref 30–36.5)
MCV RBC AUTO: 101.5 FL (ref 80–99)
MONOCYTES # BLD: 0.6 K/UL (ref 0–1)
MONOCYTES NFR BLD: 7 % (ref 5–13)
NEUTS SEG # BLD: 4.9 K/UL (ref 1.8–8)
NEUTS SEG NFR BLD: 60 % (ref 32–75)
NRBC # BLD: 0 K/UL (ref 0–0.01)
NRBC BLD-RTO: 0 PER 100 WBC
PHOSPHATE SERPL-MCNC: 2.8 MG/DL (ref 2.6–4.7)
PLATELET # BLD AUTO: 199 K/UL (ref 150–400)
PMV BLD AUTO: 10.5 FL (ref 8.9–12.9)
POTASSIUM SERPL-SCNC: 4.3 MMOL/L (ref 3.5–5.1)
PROT SERPL-MCNC: 4.8 G/DL (ref 6.4–8.2)
PROTHROMBIN TIME: 10.4 SEC (ref 9–11.1)
RBC # BLD AUTO: 2.7 M/UL (ref 3.8–5.2)
SODIUM SERPL-SCNC: 142 MMOL/L (ref 136–145)
THERAPEUTIC RANGE,PTTT: NORMAL SECS (ref 58–77)
VIT B12 SERPL-MCNC: 576 PG/ML (ref 193–986)
WBC # BLD AUTO: 8.2 K/UL (ref 3.6–11)

## 2018-10-18 PROCEDURE — 83605 ASSAY OF LACTIC ACID: CPT | Performed by: FAMILY MEDICINE

## 2018-10-18 PROCEDURE — 65660000000 HC RM CCU STEPDOWN

## 2018-10-18 PROCEDURE — P9047 ALBUMIN (HUMAN), 25%, 50ML: HCPCS | Performed by: FAMILY MEDICINE

## 2018-10-18 PROCEDURE — 74011250636 HC RX REV CODE- 250/636: Performed by: HOSPITALIST

## 2018-10-18 PROCEDURE — 85730 THROMBOPLASTIN TIME PARTIAL: CPT | Performed by: FAMILY MEDICINE

## 2018-10-18 PROCEDURE — 80053 COMPREHEN METABOLIC PANEL: CPT | Performed by: FAMILY MEDICINE

## 2018-10-18 PROCEDURE — 85025 COMPLETE CBC W/AUTO DIFF WBC: CPT | Performed by: FAMILY MEDICINE

## 2018-10-18 PROCEDURE — 93306 TTE W/DOPPLER COMPLETE: CPT

## 2018-10-18 PROCEDURE — P9047 ALBUMIN (HUMAN), 25%, 50ML: HCPCS | Performed by: HOSPITALIST

## 2018-10-18 PROCEDURE — 83735 ASSAY OF MAGNESIUM: CPT | Performed by: FAMILY MEDICINE

## 2018-10-18 PROCEDURE — 74011000250 HC RX REV CODE- 250: Performed by: HOSPITALIST

## 2018-10-18 PROCEDURE — 85610 PROTHROMBIN TIME: CPT | Performed by: FAMILY MEDICINE

## 2018-10-18 PROCEDURE — 74011250636 HC RX REV CODE- 250/636: Performed by: FAMILY MEDICINE

## 2018-10-18 PROCEDURE — 36415 COLL VENOUS BLD VENIPUNCTURE: CPT | Performed by: FAMILY MEDICINE

## 2018-10-18 PROCEDURE — 84100 ASSAY OF PHOSPHORUS: CPT | Performed by: FAMILY MEDICINE

## 2018-10-18 RX ORDER — BUMETANIDE 0.25 MG/ML
1 INJECTION INTRAMUSCULAR; INTRAVENOUS 3 TIMES DAILY
Status: DISCONTINUED | OUTPATIENT
Start: 2018-10-18 | End: 2018-10-18

## 2018-10-18 RX ORDER — ALBUMIN HUMAN 250 G/1000ML
12.5 SOLUTION INTRAVENOUS 3 TIMES DAILY
Status: DISCONTINUED | OUTPATIENT
Start: 2018-10-18 | End: 2018-10-18

## 2018-10-18 RX ORDER — ALBUMIN HUMAN 250 G/1000ML
12.5 SOLUTION INTRAVENOUS ONCE
Status: COMPLETED | OUTPATIENT
Start: 2018-10-18 | End: 2018-10-18

## 2018-10-18 RX ORDER — ALBUMIN HUMAN 250 G/1000ML
12.5 SOLUTION INTRAVENOUS EVERY 8 HOURS
Status: DISCONTINUED | OUTPATIENT
Start: 2018-10-18 | End: 2018-10-21

## 2018-10-18 RX ORDER — BUMETANIDE 0.25 MG/ML
1 INJECTION INTRAMUSCULAR; INTRAVENOUS EVERY 8 HOURS
Status: DISCONTINUED | OUTPATIENT
Start: 2018-10-18 | End: 2018-10-19

## 2018-10-18 RX ORDER — FUROSEMIDE 10 MG/ML
20 INJECTION INTRAMUSCULAR; INTRAVENOUS ONCE
Status: DISCONTINUED | OUTPATIENT
Start: 2018-10-18 | End: 2018-10-18

## 2018-10-18 RX ADMIN — ALBUMIN (HUMAN) 12.5 G: 0.25 INJECTION, SOLUTION INTRAVENOUS at 01:17

## 2018-10-18 RX ADMIN — Medication 10 ML: at 23:19

## 2018-10-18 RX ADMIN — ALBUMIN (HUMAN) 12.5 G: 0.25 INJECTION, SOLUTION INTRAVENOUS at 18:11

## 2018-10-18 RX ADMIN — BUMETANIDE 1 MG: 0.25 INJECTION INTRAMUSCULAR; INTRAVENOUS at 18:11

## 2018-10-18 NOTE — PROGRESS NOTES
Admission Medication Reconciliation: 
 
Information obtained from: patient and [facility] Significant PMH/Disease States:  
Past Medical History:  
Diagnosis Date  High cholesterol  Hypercholesterolemia  Hypertension  Other ill-defined conditions(799.89)   
 kidney stones Chief Complaint for this Admission:  leg swelling Allergies:  Seafood [shellfish containing products] Prior to Admission Medications:  
Prior to Admission Medications Prescriptions Last Dose Informant Patient Reported? Taking? cholecalciferol (VITAMIN D3) 1,000 unit tablet   Yes No  
Sig: Take 1,000 Units by mouth daily. fluticasone (FLONASE) 50 mcg/actuation nasal spray   Yes No  
[place] by Both Nostrils route daily. furosemide (LASIX) 80 mg tablet   Yes Yes Sig: Take 80 mg by mouth two (2) times a day. metoprolol succinate (TOPROL XL) 50 mg XL tablet   Yes No  
Sig: Take 150 mg by mouth daily. Facility-Administered Medications: None Comments/Recommendations: Medication review attempted with patient. She stated that she gave information to staff when she arrived but nothing is found. [facility] on record for prescription information. Changed furosemide from 40mg daily. Changed metoprolol from 50mg daily. (last filled 2018)

## 2018-10-18 NOTE — PROCEDURES
[facility]  *** FINAL REPORT ***    Name: Lori Iqbal  MRN: [id number]  : 29 Dec 1930  HIS Order #: [id number]  [id number] [facility] Visit #: [id number]  Date: 17 Oct 2018    TYPE OF TEST: Peripheral Venous Testing    REASON FOR TEST  Limb swelling    Right Leg:-  Deep venous thrombosis:           No  Superficial venous thrombosis:    No  Deep venous insufficiency:        Not examined  Superficial venous insufficiency: Not examined    Left Leg:-  Deep venous thrombosis:           No  Superficial venous thrombosis:    No  Deep venous insufficiency:        Not examined  Superficial venous insufficiency: Not examined      INTERPRETATION/FINDINGS  PROCEDURE:  Color duplex ultrasound imaging of lower extremity veins. FINDINGS:       Right: The common femoral, deep femoral, femoral, popliteal,  posterior tibial, peroneal, and great saphenous are patent and without   evidence of thrombus;  each is fully compressible and there is no  narrowing of the flow channel on color Doppler imaging. Phasic flow  is observed in the common femoral vein. Left:   The common femoral, deep femoral, femoral, popliteal,  posterior tibial, and great saphenous are patent and without evidence  of thrombus;  each is fully compressible and there is no narrowing of  the flow channel on color Doppler imaging. Phasic flow is observed in   the common femoral vein. Peroneal veins not visualized. There is an   incidental finding of a popliteal fossa fluid collection measuring  1.3cm x 0.6cm. IMPRESSION:  No evidence of right or left lower extremity vein  thrombosis where visualized. There is an incidental finding of a left   popliteal fossa fluid collection as described above. ADDITIONAL COMMENTS    I have personally reviewed the data relevant to the interpretation of  this  study.     TECHNOLOGIST: David Whitt  Signed: 10/17/2018 10:25 PM    PHYSICIAN: Radha Fish MD  Signed: 10/18/2018 07:49 AM

## 2018-10-18 NOTE — H&P
[address] HISTORY AND PHYSICAL Name:Codey MERINO. MR#: [id number] : 1930 ACCOUNT #: [de-identified] ADMIT DATE: 10/17/2018 CHIEF COMPLAINT:  Leg swelling. HISTORY OF PRESENT ILLNESS:  An 77-year-old -American female with past medical history of hypertension, hyperlipidemia, kidney stones, gait abnormality, chronic leg edema, presented to the emergency department from home via EMS with reports of leg swelling. The patient notes her legs have been swollen \"for years. \"  She is a limited historian. She is accompanied by her 2 sisters and a nephew. One of her sisters notes that the patient has had some confusion, change in her mental status, decreased mobility (per the initial ED report). The patient's sister and nephew confirmed the patient has had some questionable undiagnosed dementia as she has been increasingly more forgetful. According to the ER records, the patient was uncertain of the names of her medications. It was uncertain if she was actually taking the medications. She also reportedly had been confused, unable to state the president or the month or the year. On arrival in the emergency department, initial recorded  vital signs, blood pressure 127/69, heart rate 65, respiratory rate 20, O2 saturation 98% on room air. Initial workup included CT of the head without contrast showing no acute intracranial process. Chest x-ray, PA and lateral, shows small left pleural effusion with basilar atelectasis. Labs showed a hemoglobin of 9.3, BUN of 31 and creatinine 1.14  (compared to last creatinine of 0.89 on 2017), AST of 38 and CK total of 249 in terms of abnormal labs. The hospitalist was then consulted to admit the patient to the medical service for continued evaluation and treatment. On a current bedside evaluation, the patient's main complaint again is leg swelling, both legs.   She did not initially admit to having any dizziness, lightheadedness, focal weakness, new onset numbness, paresthesias, slurred speech, facial droop, headache, neck pain, back pain, chest pain, shortness of breath, abdominal pain, nausea, vomiting, diarrhea, melena, dysuria, hematuria, fever, chills or rash. PAST MEDICAL HISTORY: 
1. Hypertension. 2.  Hyperlipidemia. 3.  Kidney stones. 4.  Chronic leg edema. 5.  Gait abnormality, uses a cane and walker. PAST SURGICAL HISTORY:  Appendectomy in 1960; cholecystectomy 11/14/2010. MEDICATIONS:  Complete medication  list reviewed and noted on chart records. cholecalciferol (VITAMIN D3) 1,000 unit tablet    --  --  Provider, Historical   
 Take 1,000 Units by mouth daily. fluticasone (FLONASE) 50 mcg/actuation nasal spray    --  -- Delgado Jeffers MD  
 1 [place] by Both Nostrils route daily. furosemide (LASIX) 80 mg tablet    --  --  Provider, Historical  
 Take 80 mg by mouth two (2) times a day. metoprolol succinate (TOPROL XL) 50 mg XL tablet    --  --  Provider, Historical  
 Take 150 mg by mouth daily. ALLERGIES:  NO KNOWN DRUG ALLERGIES; SEAFOOD. SOCIAL HISTORY:  Former smoker. Occasional alcohol (beer), lives alone, . FAMILY HISTORY:  Heart disease in father, sister; diabetes sister, stroke sister. REVIEW OF SYSTEMS:  Pertinent positives as noted in HPI. All other systems were reviewed and negative. PHYSICAL EXAMINATION: 
VITAL SIGNS:  Temperature 97.4 degrees Fahrenheit, blood pressure 136/65, heart rate of 92, respiratory rate of 14, O2 saturation of 97% on room air. Recorded weight 138 pounds  (62.6 kg), reported height of 5 feet 4 inches tall. GENERAL:  Elderly female in no acute respiratory distress. PSYCHIATRIC:  Patient was awake, alert and oriented x3, person, place and year. Anxious.  
NEUROLOGIC:  GCS 14 (E4, V4, M6) with spontaneous eye opening, and occasional inappropriate verbal response, follows some but not all commands. Moves extremities x4 with severe generalized weakness. Sensation grossly decreased in plantar surface of both feet without slurred speech, facial droop. HEENT:  Normocephalic, atraumatic. PERRLA. EOMs intact. Sclerae are anicteric. Conjunctivae are clear. Nares are patent. Oropharynx clear. Tongue is midline, not edematous. Oral mucosa is dry. Poor oral dentition. NECK:  Supple, without lymphadenopathy, JVD, carotid bruits or thyromegaly. Nontender. No acute palpable soft tissue or bony deformity. LYMPH:  Negative for cervical or supraclavicular adenopathy. LUNGS:  Clear to auscultation bilaterally. CARDIOVASCULAR:  Heart regular rate and rhythm, normal S1, S2, without murmurs, rubs or gallops. GASTROINTESTINAL:  Abdomen is markedly distended, hard to palpation; generalized tenderness with voluntary guarding, no rebound, no rigidity with hyperactive bowel sounds. No auscultated abdominal bruits, no pulsatile or palpable abdominal mass. Extensive anasarca, edema present. BACK:  No CVA tenderness. No step-off deformity with extensive anasarca and edema extending up diffusely through upper and lower back. MUSCULOSKELETAL:  Limited range of motion with massive, generalized anasarca and edema of entire bilateral lower extremities as well as upper extremities. Tenderness on palpation of bilateral lower extremities. VASCULAR:  2+ radial, 1+ dorsalis pedis pulse without cyanosis or clubbing. Generalized anasarca edema extends in bilateral upper and lower extremities, bilateral chest, breasts (with exam performed with female nurse chaperone), abdominal wall, back. Chronic venous stasis discoloration of both legs. SKIN:  Warm and dry. Dry scaling of skin of both legs with some blister formation secondary to edema.  
 
LABORATORY DATA:  I reviewed as follows:  Sodium 142, potassium 4.5, chloride 108, CO2 26, BUN 31, creatinine 1.14, glucose 102, anion gap of 8, calcium 7.7, GFR 55. Total bilirubin 0.2, total protein 5.0, albumin is 0.6, ALT of 11, AST of 38, alkaline phosphatase of 99. CK total 249; CK-MB of 4.5; CK-MB index 1.8. WBC 6.3, hemoglobin 9.3, hematocrit 30.6, platelets 197, MCV of 105.2 and MCHC of 30.4. Neutrophils of 62%. Urinalysis:  Leuco-esterase negative, nitrites negative, urobilinogen is 1.0, bilirubin negative, blood moderate, ketones negative, glucose negative, protein greater than 300, pH of 5.0, specific gravity 1.028. WBCs  0-4, RBCs 5-10; bacteria negative. Chest x-ray, PA and lateral, results are reviewed and noted in HPI. CT of the head without contrast shows no evidence of acute intracranial abnormality with periventricular and subcortical white matter, hypoattenuation, which is nonspecific finding associated with chronic microvascular ischemic change. A 12-lead EKG, normal sinus rhythm, ST changes in inferior leads at 94 beats per minute. IMPRESSION AND PLAN: 
1. Altered mental status. Admit patient to telemetry. Place on neurovascular checks, fall precautions. Per discussion with patient's family, they are concerned of the patient's ability to be able to care for herself and they are interested in trying to establish eventual nursing home placement upon discharge. Patient is oriented x3; however, she had poor insight and awareness as to her current living situation. Per the ED report, the patient had been found covered in both the urine and feces with poor hygiene. Patient is not able to care for herself, as noted a safety issue. Will consult with case management for discharge planning. 2.  Generalized anasarca edema -- severe. Edema extends the entirety of bilateral upper and lower extremities up to chest, breast, abdomen and back. I have ordered a proBNP level to provider further assessment of her status.   I ordered a 2D echocardiogram in the a.m. to evaluate for congestive heart failure. Patient has elevated creatinine; however, tonight with the amount of fluid noted on exam, will order Lasix for diuresis unless the pending lactic acid level should be markedly elevated. As mentioned, lab tests are still currently pending which I have ordered. 3.  Generalized abdominal pain -- may be secondary to noted anasarca edema. As patient is anemic, I have also ordered a stool occult blood test.  Rectal examination was performed and stool occult blood test is currently pending to rule out a GI bleed. I have ordered CT abdomen and pelvis without contrast to evaluate for any acute intra-abdominal or pelvic process. In the interim, patient will be n.p.o. pending study and pending her passage of dysphagia screen. 4.  Unsteady gait with gait abnormality. Place on fall precautions. Will need physical and occupational therapy. 5.  Anemia -- macrocytic normochromic. Evaluate vitamin B12 and folate levels and also check the iron profile as part of workup. Check stool occult blood test.  Repeat CBC. 6.  Hypoalbuminemia. Concern for decreased nutritional state. Although the patient's body habitus below the neck is very edematous above the neck, she looks malnourished and very frail. Check a prealbumin level. Consider for nutrition consultation. 7.  Elevated liver function tests -- mild. Repeat comprehensive metabolic panel. 8.  Elevated creatinine. Concern for possible dehydration. However, no aggressive IV fluids will be given at this time due to the patient's marked anasarca and edema. She is otherwise hemodynamically stable at this time. We will repeat the renal panel in the a.m. 
8.  Hypertension. Monitor blood pressure closely. Resume her home medication. 9.  Hyperlipidemia noted in chart records. We will check a lipid panel in the a.m. to further assess the same. 10.  Generalized weakness/physical debility. Plan as noted above. 11.  Venous thromboembolism prophylaxis. SCDs to bilateral lower extremities. The patient has a venous duplex that is negative for deep vein thrombosis. 12.  Bilateral lower extremity edema. Order venous duplex of the lower extremities to rule out deep vein thrombosis. CURRENT CODE STATUS IS FULL CODE. MD MAURO Bianchi/[place] 
D: 10/17/2018 22:04    
T: 10/17/2018 23:19 
JOB #: [id number]

## 2018-10-18 NOTE — PROGRESS NOTES
Reason for Admission:   Bilateral leg edema, unsteady gait, dehydration, AMS 
               
RRAT Score:     14 Do you (patient/family) have any concerns for transition/discharge? CM spoke with patient's son Lu Noonan: [phone]), and son expressed several concerns including: unsafe home environment, patient refusing care and to allow community services into the house; decreased appetite and no longer cooking, limited mobility and ambulation due to leg edema, refusal to see PCP and nephrologist, questioning patient's capacity to make decisions Plan for utilizing home health:     TBD: patient was open to [facility] Likelihood of readmission? Moderate; patient was reluctant to come to the hospital; patient has used [facility] as well Transition of Care Plan:      CM spoke with patient's son, Marquis Chaidez, regarding patient's current home environment and transitions of care. Son expressed several concerns regarding the patient's decision making capacity, refusal for medical care, refusal of letting support services into the house, decreased appetite and not cooking, decreased ambulation and mobility. The son believes that the patient is not safe to discharge independently into her home environment, as the house is 2 story with external steps, and the full bath is on the second floor. Per the son, the patient has not been able to go upstairs in at least 1 year. When patient's sister checked on her yesterday, 10/17/18, the patient was found with old feces in her depends. Son is requesting to have her decision making competency reviewed. Son reviewed POA documentation, and it does not include mPOA. However, the son is patient's only child and is thus [facility]. Patient was at [facility] for 1 week in June and discharged to [facility] for 20 days.   Upon discharge from [facility], patient was set up with [facility] and a , but patient refused to let these services into the home. Per son, patient uses a walker but declined a rollator. Patient's son is in the [place] area, as he teaches high school special ed social studies. Patient has 2 sisters in the [place] area, but they are not consistent in checking on the patient. Only 1 sister [facility]) drives. Pharmacy preference identified as [facility] on [address]. CM conferenced with CCL, and agreed to plan includes: PT/OT Consults, Psych Consult, and Palliative Care Consult. All consults have been submitted. CM submitted referral to [facility] via [employer] NOTE: it appears that patient is followed by PCP Dr. Lela Maza ([phone]) NOTE: Per son, the patient has at minimum an additional [employer]  supplement plan NOTE: Previous mention of possible Medicaid coverage was noted. CM to continue following for ongoing disposition planning. CRM: Princess Blizzard, MPH; Z: [phone]

## 2018-10-18 NOTE — ED NOTES
2323 Pt bladder scanned after 6+ hours of no urination. 213mL on scan - alvarez catheter placed using sterile technique x2 nurses.

## 2018-10-18 NOTE — ED NOTES
TRANSFER - OUT REPORT: 
 
Verbal report given to lisseth George (name) on Chidi Aguayo  being transferred to  (unit) for routine progression of care Report consisted of patients Situation, Background, Assessment and  
Recommendations(SBAR). Information from the following report(s) Kardex, Intake/Output and Accordion was reviewed with the receiving nurse. Lines:  
Peripheral IV 10/17/18 Right Hand (Active) Site Assessment Clean, dry, & intact 10/17/2018  9:33 PM  
Phlebitis Assessment 0 10/17/2018  9:33 PM  
Infiltration Assessment 0 10/17/2018  9:33 PM  
Dressing Status Clean, dry, & intact 10/17/2018  9:33 PM  
  
 
Opportunity for questions and clarification was provided. Patient transported with: 
 Monitor Registered Nurse

## 2018-10-18 NOTE — CDMP QUERY
Dear Dr. Cleaning Man, Please clarify if this patient is (was) being treated/managed for:  
 
=> Moderate protein-calorie malnutrition in the setting of malnourished and frail requiring IVF, nutrition consult, lab monitoring 
=> Other explanation of clinical findings 
=> Clinically Undetermined (no explanation for clinical findings) The medical record reflects the following clinical findings, treatment, and risk factors. Risk Factors:  adm with AMS and leg edema Clinical Indicators:  per H&P-Concern for decreased nutritional state,she looks malnourished and very frail, pt appears unkept, protein 5.0, albumin 0.6 Treatment: IVF, nutrition consult and lab monitoring Please clarify and document your clinical opinion in the progress notes and discharge summary including the definitive and/or presumptive diagnosis, (suspected or probable), related to the above clinical findings. Please include clinical findings supporting your diagnosis. Thank You 
Adrien Katz,MSN,BSN,RN,[facility] 
[phone]

## 2018-10-18 NOTE — PROGRESS NOTES
0920 Labs obtained. C-Diff order discontinued. Patient has not had any diarrhea and did not have diarrhea before coming to hospital..

## 2018-10-18 NOTE — PROGRESS NOTES
0500  In to draw am labs and perform assessment. patient not cooperating at present. Will try later.

## 2018-10-18 NOTE — WOUND CARE
WOCN Note:  
 
New consult placed for assessment of bilateral low legs. Chart reviewed. Admitted DX: Bilateral leg edema Unsteady gait Dehydration Altered mental status Past Medical History:  hypertension, hyperlipidemia, kidney stones, gait abnormality, chronic leg edema Admitted from home. Assessment:  
Patient is alert, communicative and requires assist with repositioning. Bed: total care Patient has alvarez. Patient reports no pain. Bilateral heel, buttocks, and sacral skin intact and without erythema. Heels offloaded on pillow. Generalized edema. Skin is firm and dry. Bilateral low legs have edema with scattered fluid filled bullae and dry flaky skin. Recommendations:  
Upgrade bed Float heels Turn team 
  
Bilateral low legs:  Twice daily cleanse with foam cleanser and apply Aloe Vesta. Skin Care & Pressure Prevention: 
Minimize layers of linen/pads under patient to optimize support surface. Turn/reposition approximately every 2 hours and offload heels. Specialty bed: P500 ordered via [facility]. Use only flat sheet and one incontinence pad. Please call [facility] if not delivered. Ref# Discussed above plan with patient and RN. Transition of Care: Plan to follow weekly and as needed while admitted to hospital. 
 
RAMA HodgeN RN [facility], [facility]. Wound Care Office [phone] Pager [phone]

## 2018-10-18 NOTE — CONSULTS
I have examined the patient. I have reviewed the chart and agree with the documentation recorded by the NP, including the assessment, treatment plan, and disposition. No urgent indications for endoscopy  Check iron profile  Very low albumin, suspect the cause of generalized edema  Consider nephrology consult to manage the fluid overload and look  For proteinuria  Will follow    Rosa Jade PA-C  [phone] office  [phone] NP/PA in-hospital cell phone M-F until 4:30PM  After 5PM or on weekends, please call  for physician on call        NAME:  Shahab Morales   :   1930   MRN:   [id number]       Referring Physician: Dr. Thais Johnson Date: 10/18/2018 12:06 PM    Chief Complaint: generalized swelling     History of Present Illness:  Patient is a 80 y.o. who is seen in consultation at the request of Dr. Radha Saamyoa for GI bleed. Patient has a past medical history of hypertension, hyperlipidemia, kidney stones, gait abnormality, and chronic leg edema. She presented to the ED from home via EMS with reports of leg swelling. Patient was admitted to the hospital on 10/17/18. Patient complains of generalized abdominal pain. She describes the pain as a deep, pressure sensation. No clear aggravating or alleviating factors. No nausea, reflux, vomiting, or diarrhea. Denies melena or hematochezia. She reports having 1 bowel movement daily. She is unsure as to when and where she had both a colonoscopy and EGD. There are no records of these available for review in [facility]. ERCP (11/15/10). I have reviewed the emergency room note, hospital admission note, notes by all other clinicians who have seen the patient during this hospitalization to date. I have reviewed the problem list and the reason for this hospitalization.  I have reviewed the allergies and the medications the patient was taking at home prior to this hospitalization. PMH:  Past Medical History:   Diagnosis Date    High cholesterol     Hypercholesterolemia     Hypertension     Other ill-defined conditions(799.89)     kidney stones       PSH:  Past Surgical History:   Procedure Laterality Date    HX APPENDECTOMY  1960    REMOVAL GALLBLADDER  2010       Allergies: Allergies   Allergen Reactions    Seafood [Shellfish Containing Products] Swelling       Home Medications:  Prior to Admission Medications   Prescriptions Last Dose Informant Patient Reported? Taking? cholecalciferol (VITAMIN D3) 1,000 unit tablet   Yes No   Sig: Take 1,000 Units by mouth daily. fluticasone (FLONASE) 50 mcg/actuation nasal spray   Yes No   [place] by Both Nostrils route daily. furosemide (LASIX) 80 mg tablet   Yes Yes   Sig: Take 80 mg by mouth two (2) times a day. metoprolol succinate (TOPROL XL) 50 mg XL tablet   Yes No   Sig: Take 150 mg by mouth daily.       Facility-Administered Medications: None       Hospital Medications:  Current Facility-Administered Medications   Medication Dose Route Frequency    sodium chloride (NS) flush 5-10 mL  5-10 mL IntraVENous Q8H    sodium chloride (NS) flush 5-10 mL  5-10 mL IntraVENous PRN       Social History:  Social History     Tobacco Use    Smoking status: Former Smoker    Smokeless tobacco: Never Used   Substance Use Topics    Alcohol use: Yes     Comment: occassionally       Family History:  Family History   Problem Relation Age of Onset    Heart Disease Father     Stroke Sister     Heart Disease Sister     Diabetes Sister        Review of Systems:  Constitutional: negative fever, negative chills, negative weight loss  Eyes:   negative visual changes  ENT:   negative sore throat, tongue or lip swelling  Respiratory:  negative cough, negative dyspnea  Cards:  negative for chest pain, negative palpitations, + lower extremity edema  GI:   See HPI  :  negative for frequency, dysuria  Integument:  negative for rash and pruritus  Heme:  negative for easy bruising and gum/nose bleeding  Musculoskeletal:negative for myalgias, back pain and muscle weakness  Neuro:  negative for headaches, dizziness, vertigo  Psych: negative for feelings of anxiety, depression     Objective:     Patient Vitals for the past 8 hrs:   BP Temp Pulse Resp SpO2 Weight   10/18/18 0808 118/69 97.8 °F (36.6 °C) 90 18 99 % 84.9 kg (187 lb 2.7 oz)     No intake/output data recorded. No intake/output data recorded. EXAM:     CONST:  Pleasant female lying in bed, no acute distress   NEURO:  Alert and oriented to date of birth, year, and city; not hospital. Confused. HEENT: EOMI, no scleral icterus   LUNGS: CTA bilaterally anteriorly   CARD:  S1 S2   ABD:  Distended, firm, + edema. No guarding or rebound. + Bowel sounds. EXT:  + edema   PSYCH: Not full, not anxious or agitated     Data Review     Recent Labs     10/18/18  0925 10/17/18  1604   WBC 8.2 6.3   HGB 8.6* 9.3*   HCT 27.4* 30.6*    221     Recent Labs     10/18/18  0925 10/17/18  1604    142   K 4.3 4.5   * 108   CO2 27 26   BUN 28* 31*   CREA 0.95 1.14*   GLU 80 102*   PHOS 2.8  --    CA 7.9* 7.7*     Recent Labs     10/18/18  0925 10/17/18  2054 10/17/18  1604   SGOT 30  --  38*   AP 99  --  99   TP 4.8*  --  5.0*   ALB 0.8*  --  0.6*   GLOB 4.0  --  4.4*   LPSE  --  290  --      Recent Labs     10/18/18  0925   INR 1.0   PTP 10.4   APTT 22.8       10/17/18  EXAM:  CT ABD PELV WO CONT     INDICATION: ABD DISTENTION     COMPARISON: None     CONTRAST:  None. TECHNIQUE:    Thin axial images were obtained through the abdomen and pelvis. Coronal and   sagittal reconstructions were generated. Oral contrast was not administered. CT   dose reduction was achieved through use of a standardized protocol tailored for   this examination and automatic exposure control for dose modulation. The absence of intravenous contrast material reduces the sensitivity for   evaluation of the solid parenchymal organs of the abdomen. FINDINGS:    LUNG BASES: Clear. Bilateral small pleural effusion   INCIDENTALLY IMAGED HEART AND MEDIASTINUM: Cardiomegaly   LIVER: No mass or biliary dilatation. GALLBLADDER: Cystectomy   SPLEEN: No mass. PANCREAS: No mass or ductal dilatation. ADRENALS: Unremarkable. KIDNEYS/URETERS: No mass, calculus, or hydronephrosis. STOMACH: Unremarkable. SMALL BOWEL: No dilatation or wall thickening. COLON: No dilatation or wall thickening. Sigmoid diverticulosis of. APPENDIX: Unremarkable. PERITONEUM: Mild amount of ascites. [place] RETROPERITONEUM: No lymphadenopathy or aortic aneurysm. REPRODUCTIVE ORGANS:   URINARY BLADDER: Layton catheter in a nondistended bladder. [place] BONES: No destructive bone lesion. Anasarca      Impression     IMPRESSION:   Anasarca, with effusions and ascites. Assessment:   · Anemia with hemoccult positive stool. Hgb 8.6, platelets 704, INR 1.0. No anticoagulation on PTA meds. · Altered mental status  · Anasarca: Echo completed this AM.   · Generalized abdominal pain: WBC normal, LFTs normal, lipase normal, lactic acid normal. CT abdomen/pelvis without contrast (10/17/18): anasarca with effusions and ascites. · Unsteady gait     Patient Active Problem List   Diagnosis Code    Cholelithiasis with cholecystitis K80.10    Dizziness R42    ARF (acute renal failure) ([facility] [address] [address] N17.9    Proteinuria R80.9    Dehydration E86.0    Altered mental status R41.82    Unsteady gait R26.81    Bilateral leg edema R60.0     Plan:   · Trend CBC and transfuse as necessary. No signs of active GI bleeding.    · Palliative medicine and psychiatry consulted  · Anasarca management per primary  · Will follow  · Thank you for allowing me to participate in care of Raquel Ortiz     Signed By: Leslie Ramos     10/18/2018  12:06 PM

## 2018-10-19 ENCOUNTER — TELEPHONE (OUTPATIENT)
Dept: ONCOLOGY | Age: 83
End: 2018-10-19

## 2018-10-19 ENCOUNTER — APPOINTMENT (OUTPATIENT)
Dept: GENERAL RADIOLOGY | Age: 83
DRG: 545 | End: 2018-10-19
Attending: INTERNAL MEDICINE
Payer: MEDICARE

## 2018-10-19 LAB
ALBUMIN SERPL-MCNC: 1.2 G/DL (ref 3.5–5)
ANION GAP SERPL CALC-SCNC: 9 MMOL/L (ref 5–15)
BUN SERPL-MCNC: 27 MG/DL (ref 6–20)
BUN/CREAT SERPL: 25 (ref 12–20)
CALCIUM SERPL-MCNC: 7.3 MG/DL (ref 8.5–10.1)
CHLORIDE SERPL-SCNC: 108 MMOL/L (ref 97–108)
CO2 SERPL-SCNC: 25 MMOL/L (ref 21–32)
COMMENT, HOLDF: NORMAL
CREAT SERPL-MCNC: 1.06 MG/DL (ref 0.55–1.02)
CREAT UR-MCNC: 47.6 MG/DL
FERRITIN SERPL-MCNC: 678 NG/ML (ref 8–252)
GLUCOSE SERPL-MCNC: 97 MG/DL (ref 65–100)
IRON SATN MFR SERPL: 48 % (ref 20–50)
IRON SERPL-MCNC: 24 UG/DL (ref 35–150)
PHOSPHATE SERPL-MCNC: 3.2 MG/DL (ref 2.6–4.7)
POTASSIUM SERPL-SCNC: 4.4 MMOL/L (ref 3.5–5.1)
PROT UR-MCNC: 563 MG/DL (ref 0–11.9)
PROT/CREAT UR-RTO: 11.8
SAMPLES BEING HELD,HOLD: NORMAL
SODIUM SERPL-SCNC: 142 MMOL/L (ref 136–145)
TIBC SERPL-MCNC: 50 UG/DL (ref 250–450)
TROPONIN I SERPL-MCNC: 0.05 NG/ML

## 2018-10-19 PROCEDURE — G8987 SELF CARE CURRENT STATUS: HCPCS

## 2018-10-19 PROCEDURE — 82728 ASSAY OF FERRITIN: CPT | Performed by: INTERNAL MEDICINE

## 2018-10-19 PROCEDURE — 97116 GAIT TRAINING THERAPY: CPT

## 2018-10-19 PROCEDURE — 65660000000 HC RM CCU STEPDOWN

## 2018-10-19 PROCEDURE — 83540 ASSAY OF IRON: CPT | Performed by: INTERNAL MEDICINE

## 2018-10-19 PROCEDURE — 77075 RADEX OSSEOUS SURVEY COMPL: CPT

## 2018-10-19 PROCEDURE — 74011250637 HC RX REV CODE- 250/637: Performed by: INTERNAL MEDICINE

## 2018-10-19 PROCEDURE — 76450000000

## 2018-10-19 PROCEDURE — P9047 ALBUMIN (HUMAN), 25%, 50ML: HCPCS | Performed by: HOSPITALIST

## 2018-10-19 PROCEDURE — 83880 ASSAY OF NATRIURETIC PEPTIDE: CPT | Performed by: INTERNAL MEDICINE

## 2018-10-19 PROCEDURE — G8988 SELF CARE GOAL STATUS: HCPCS

## 2018-10-19 PROCEDURE — 97162 PT EVAL MOD COMPLEX 30 MIN: CPT

## 2018-10-19 PROCEDURE — 36415 COLL VENOUS BLD VENIPUNCTURE: CPT | Performed by: INTERNAL MEDICINE

## 2018-10-19 PROCEDURE — 97165 OT EVAL LOW COMPLEX 30 MIN: CPT

## 2018-10-19 PROCEDURE — 92610 EVALUATE SWALLOWING FUNCTION: CPT | Performed by: SPEECH-LANGUAGE PATHOLOGIST

## 2018-10-19 PROCEDURE — 74011000250 HC RX REV CODE- 250: Performed by: INTERNAL MEDICINE

## 2018-10-19 PROCEDURE — 74011000250 HC RX REV CODE- 250: Performed by: HOSPITALIST

## 2018-10-19 PROCEDURE — 97535 SELF CARE MNGMENT TRAINING: CPT

## 2018-10-19 PROCEDURE — 74011250636 HC RX REV CODE- 250/636: Performed by: HOSPITALIST

## 2018-10-19 PROCEDURE — 80069 RENAL FUNCTION PANEL: CPT | Performed by: HOSPITALIST

## 2018-10-19 PROCEDURE — 84484 ASSAY OF TROPONIN QUANT: CPT | Performed by: INTERNAL MEDICINE

## 2018-10-19 PROCEDURE — 84156 ASSAY OF PROTEIN URINE: CPT | Performed by: INTERNAL MEDICINE

## 2018-10-19 RX ORDER — LOSARTAN POTASSIUM 50 MG/1
25 TABLET ORAL DAILY
Status: DISCONTINUED | OUTPATIENT
Start: 2018-10-19 | End: 2018-10-22

## 2018-10-19 RX ORDER — BUMETANIDE 0.25 MG/ML
2 INJECTION INTRAMUSCULAR; INTRAVENOUS EVERY 8 HOURS
Status: DISCONTINUED | OUTPATIENT
Start: 2018-10-19 | End: 2018-10-21

## 2018-10-19 RX ORDER — ENOXAPARIN SODIUM 100 MG/ML
40 INJECTION SUBCUTANEOUS EVERY 24 HOURS
Status: DISCONTINUED | OUTPATIENT
Start: 2018-10-19 | End: 2018-10-22

## 2018-10-19 RX ADMIN — Medication 10 ML: at 21:46

## 2018-10-19 RX ADMIN — ALBUMIN (HUMAN) 12.5 G: 0.25 INJECTION, SOLUTION INTRAVENOUS at 10:32

## 2018-10-19 RX ADMIN — BUMETANIDE 1 MG: 0.25 INJECTION INTRAMUSCULAR; INTRAVENOUS at 05:06

## 2018-10-19 RX ADMIN — BUMETANIDE 2 MG: 0.25 INJECTION INTRAMUSCULAR; INTRAVENOUS at 10:32

## 2018-10-19 RX ADMIN — Medication 10 ML: at 10:33

## 2018-10-19 RX ADMIN — BUMETANIDE 2 MG: 0.25 INJECTION INTRAMUSCULAR; INTRAVENOUS at 18:46

## 2018-10-19 RX ADMIN — LOSARTAN POTASSIUM 25 MG: 25 TABLET, FILM COATED ORAL at 10:32

## 2018-10-19 RX ADMIN — ALBUMIN (HUMAN) 12.5 G: 0.25 INJECTION, SOLUTION INTRAVENOUS at 01:56

## 2018-10-19 RX ADMIN — ENOXAPARIN SODIUM 40 MG: 40 INJECTION SUBCUTANEOUS at 21:46

## 2018-10-19 RX ADMIN — ALBUMIN (HUMAN) 12.5 G: 0.25 INJECTION, SOLUTION INTRAVENOUS at 18:46

## 2018-10-19 NOTE — PROGRESS NOTES
Spiritual Care Assessment/Progress Note [address] 
 
 
NAME: Digna Aguirre      MRN: [id number] AGE: 80 y.o. SEX: female Jewish Affiliation: Adventism  
Language: English  
 
10/19/2018     Total Time (in minutes): 25 Spiritual Assessment begun in [facility] TELE[facility] through conversation with: 
  
    [x]Patient        [] Family    [] Friend(s) Reason for Consult: Palliative Care, Initial/Spiritual Assessment Spiritual beliefs: (Please include comment if needed) [x] Identifies with a tiffany tradition:     
   [x] Supported by a tiffany community:        
   [] Claims no spiritual orientation:       
   [] Seeking spiritual identity:            
   [] Adheres to an individual form of spirituality:       
   [] Not able to assess:                   
 
    
Identified resources for coping:  
   [x] Prayer                           
   [] Music                  [] Guided Imagery [x] Family/friends                 [] Pet visits [] Devotional reading                         [] Unknown 
   [] Other:                                          
 
 
Interventions offered during this visit: (See comments for more details) Patient Interventions: Affirmation of emotions/emotional suffering, Affirmation of tiffany, Iconic (affirming the presence of God/Higher Power), Normalization of emotional/spiritual concerns, Prayer (assurance of) Plan of Care: 
 
 [] Support spiritual and/or cultural needs  
 [] Support AMD and/or advance care planning process    
 [] Support grieving process 
 [] Coordinate Rites and/or Rituals  
 [] Coordination with community clergy [] No spiritual needs identified at this time 
 [] Detailed Plan of Care below (See Comments)  [] Make referral to Music Therapy 
[] Make referral to Pet Therapy    
[] Make referral to Addiction services 
[] Make referral to [facility] 
[] Make referral to Spiritual Care Partner 
[] No future visits requested [x] Follow up visits as needed Comments:  visit for palliative consult. Pt was sitting up in chair eating lunch. She mentioned how good the food has been and is enjoying her food. Pt shared that she is a member of [facility] [employer] in [facility]. Pt welcomed visit, and was thankful for the time spent with her.  provided pastoral listening, support and prayer.  follow up as needed. Jeanna Mustafa, MACE 
 [phone])

## 2018-10-19 NOTE — PROGRESS NOTES
Problem: Self Care Deficits Care Plan (Adult) Goal: *Acute Goals and Plan of Care (Insert Text) Occupational Therapy Goals Initiated 10/19/2018 1. Patient will perform grooming with supervision/set-up standing at the sink within 7 day(s). 2.  Patient will perform bathing seated with minimal assistance/contact guard assist within 7 day(s) using AE PRN. 3.  Patient will perform lower body dressing with minimal assistance/contact guard assist within 7 day(s) using AE PRN. 4.  Patient will perform toilet transfers with supervision/set-up within 7 day(s). 5.  Patient will perform all aspects of toileting with supervision/set-up within 7 day(s). 6.  Patient will participate in upper extremity therapeutic exercise/activities with supervision/set-up for 5 minutes within 7 day(s). 7.  Patient will utilize energy conservation techniques during functional activities with verbal cues within 7 day(s). Occupational Therapy EVALUATION Patient: Anjali oLpez (89 y.o. female) Date: 10/19/2018 Primary Diagnosis: Bilateral leg edema Unsteady gait Dehydration Altered mental status Precautions: fall ASSESSMENT : 
Based on the objective data described below, the patient presents with total-setup for ADL transfers and tasks secondary to the following performance deficits including but not limited to: strength, coordination, problem-solving, safety awareness, balance, mobility, dexterity, and confusion. Patient is a questionable historian at this time due to inconsistencies in report during initial evaluation. Per patient reports, patient lived on the 2nd floor of her home PTA and was largely IND for ADL tasks. Patient reports she needs assistance for community presentation. Currently, patient presents as unkempt and requires MOD A x 2 for functional mobility and total A for LB ADLs with BLE edema. Recommend SNF level rehab to maximize patient safety and independence post discharge.  If patient desires to return home, recommend home with 24/7 supervision, skilled caregiver, and [address] [place] [address] services. Recommend with nursing patient to complete as able in order to maintain strength, endurance and independence: ADLs with supervision/setup, OOB to chair 3x/day and mobilizing to the bathroom for toileting with 2 person assist. Thank you for your assistance. Patient will benefit from skilled intervention to address the above impairments. Patients rehabilitation potential is considered to be Good Factors which may influence rehabilitation potential include:  
[x]             None noted []             Mental ability/status []             Medical condition []             Home/family situation and support systems []             Safety awareness []             Pain tolerance/management 
[]             Other: PLAN : 
Recommendations and Planned Interventions: 
[x]               Self Care Training                  [x]        Therapeutic Activities [x]               Functional Mobility Training    [x]        Cognitive Retraining 
[x]               Therapeutic Exercises           [x]        Endurance Activities [x]               Balance Training                   []        Neuromuscular Re-Education []               Visual/Perceptual Training     [x]   Home Safety Training 
[x]               Patient Education                 [x]        Family Training/Education []               Other (comment): Frequency/Duration: Patient will be followed by occupational therapy 5 times a week to address goals. Discharge Recommendations: Chava Sims Further Equipment Recommendations for Discharge: TBD SUBJECTIVE:  
Patient stated I use something to help me put my socks on, it's right over there.  Patient confused with no AE in room OBJECTIVE DATA SUMMARY:  
HISTORY:  
Past Medical History:  
Diagnosis Date  High cholesterol  Hypercholesterolemia  Hypertension  Other ill-defined conditions(799.89)   
 kidney stones Past Surgical History:  
Procedure Laterality Date [address]  REMOVAL GALLBLADDER  11/14/2010 Prior Level of Function/Environment/Context: PTA, patient lived alone in 2 story apartment with bath on 2nd level. Patient reports she was independent with ADL tasks however patient unkempt with strong odor at this time. Expanded or extensive additional review of patient history:  
 
Home Situation Home Environment: Private residence # Steps to Enter: 4 Rails to Enter: Yes Hand Rails : Bilateral 
One/Two Story Residence: Two story Living Alone: Yes Support Systems: Child(jose) Patient Expects to be Discharged to[de-identified] Private residence Current DME Used/Available at Home: Dellar Neno, straight, Walker, rolling, Transfer bench, Commode, bedside Tub or Shower Type: Tub/Shower combination Hand dominance: RightEXAMINATION OF PERFORMANCE DEFICITS: 
Cognitive/Behavioral Status: 
Neurologic State: Alert Orientation Level: Oriented to person;Oriented to situation;Oriented to place Cognition: Poor safety awareness;Decreased attention/concentration; Impaired decision making Perception: Appears intact Perseveration: Perseverates during conversation Safety/Judgement: Lack of insight into deficits Skin: dry, flaky skin Edema: BLE edema Hearing: Auditory Auditory Impairment: NoneRange of Motion: BUE 
AROM: Generally decreased, functional 
  
  
  
  
  
  
  
Strength: BUE Strength: Generally decreased, functional 
  
  
  
  
Coordination: 
Coordination: Generally decreased, functional 
Fine Motor Skills-Upper: Left Impaired;Right Impaired Gross Motor Skills-Upper: Left Intact; Right Intact Tone & Sensation: BUE Tone: Normal 
Sensation: Intact Balance: 
Sitting: Intact; With support; Without support Standing: Impaired Standing - Static: Fair Standing - Dynamic : Poor Functional Mobility and Transfers for ADLs:Bed Mobility: 
Supine to Sit: Contact guard assistance Transfers: 
Sit to Stand: Moderate assistance;Assist x2 Stand to Sit: Moderate assistance Bed to Chair: Moderate assistance;Assist x2 Toilet Transfer : Moderate assistance;Assist x2 ADL Assessment: 
Feeding: Modified independent(increased time to open containers) Oral Facial Hygiene/Grooming: Setup(infer from standing balance; needs to be seated) Bathing: Maximum assistance(infer from standing balance) Upper Body Dressing: Stand-by assistance(infer from balance and BUE ROM) Lower Body Dressing: Total assistance(infer from decreased dexterity and standing balance) Toileting: Total assistance(for pericare and clothing management) ADL Intervention and task modifications: 
  
Patient required verbal cues for safely sequencing with walker throughout session with patient demonstrating poor carryover. Patient required A for clothing management and pericare following a bowel movement with patient using walker for support. Lower Body Dressing Assistance Socks: Total assistance (dependent) Toileting Toileting Assistance: Total assistance(dependent) Cognitive Retraining Safety/Judgement: Lack of insight into deficits Functional Measure: 
Barthel Index: 
 
Bathin Bladder: 0 Bowels: 5 Groomin Dressin Feedin Mobility: 0 Stairs: 0 Toilet Use: 0 Transfer (Bed to Chair and Back): 5 Total: 25 Barthel and G-code impairment scale: 
Percentage of impairment CH 
0% CI 
1-19% CJ 
20-39% CK 
40-59% CL 
60-79% CM 
80-99% CN 
100% Barthel Score 0-100 100 99-80 79-60 59-40 20-39 1-19 
 0 Barthel Score 0-20 20 17-19 13-16 9-12 5-8 1-4 0 The Barthel ADL Index: Guidelines 1. The index should be used as a record of what a patient does, not as a record of what a patient could do.  
2. The main aim is to establish degree of independence from any help, physical or verbal, however minor and for whatever reason. 3. The need for supervision renders the patient not independent. 4. A patient's performance should be established using the best available evidence. Asking the patient, friends/relatives and nurses are the usual sources, but direct observation and common sense are also important. However direct testing is not needed. 5. Usually the patient's performance over the preceding 24-48 hours is important, but occasionally longer periods will be relevant. 6. Middle categories imply that the patient supplies over 50 per cent of the effort. 7. Use of aids to be independent is allowed. Rufina Paul., Barthel, DJodiW. ([id number]). Functional evaluation: the Barthel Index. [address] [facility] (14)2. Khushi Faith jackie AMBER Ponce, Keon Carrera., Priscila Bates., Banks, [address] [place] [address] Ave (1999). Measuring the change indisability after inpatient rehabilitation; comparison of the responsiveness of the Barthel Index and Functional Summers Measure. Journal of Neurology, Neurosurgery, and Psychiatry, 66(4), 754-158. Judge Mercer, NJodiJ.A, MALATHI Simpson, & Matt Lemus M.A. (2004.) Assessment of post-stroke quality of life in cost-effectiveness studies: The usefulness of the Barthel Index and the EuroQoL-5D. [facility], 13, 536-99 G codes: In compliance with CMSs Claims Based Outcome Reporting, the following G-code set was chosen for this patient based on their primary functional limitation being treated: The outcome measure chosen to determine the severity of the functional limitation was the Barthel Index with a score of 25/100 which was correlated with the impairment scale. ? Self Care:  
  - CURRENT STATUS: CL - 60%-79% impaired, limited or restricted  - GOAL STATUS: CJ - 20%-39% impaired, limited or restricted  - D/C STATUS:  ---------------To be determined--------------- Occupational Therapy Evaluation Charge Determination History Examination Decision-Making LOW Complexity : Brief history review  MEDIUM Complexity : 3-5 performance deficits relating to physical, cognitive , or psychosocial skils that result in activity limitations and / or participation restrictions MEDIUM Complexity : Patient may present with comorbidities that affect occupational performnce. Miniml to moderate modification of tasks or assistance (eg, physical or verbal ) with assesment(s) is necessary to enable patient to complete evaluation Based on the above components, the patient evaluation is determined to be of the following complexity level: LOW Pain: 
Pain Scale 1: Numeric (0 - 10) Pain Intensity 1: 0 Activity Tolerance: VSS Please refer to the flowsheet for vital signs taken during this treatment. After treatment:  
[x] Patient left in no apparent distress sitting up in chair 
[] Patient left in no apparent distress in bed 
[x] Call bell left within reach [x] Nursing notified 
[] Caregiver present 
[] Bed alarm activated COMMUNICATION/EDUCATION:  
The patients plan of care was discussed with: Physical Therapist and Registered Nurse. 
[] Home safety education was provided and the patient/caregiver indicated understanding. [] Patient/family have participated as able in goal setting and plan of care. [] Patient/family agree to work toward stated goals and plan of care. [] Patient understands intent and goals of therapy, but is neutral about his/her participation. [x] Patient is unable to participate in goal setting and plan of care. This patients plan of care is appropriate for delegation to [facility]. Thank you for this referral. 
Leon Major Time Calculation: 34 mins

## 2018-10-19 NOTE — CONSULTS
NEPHROLOGY CONSULT NOTE     Patient: Lisa Farmer MRN: [id number]  PCP: Lisa Tenorio MD   :     1930  Age:   80 y.o. Sex:  female      Referring physician: Lenka Hurtado MD  Reason for consultation: 80 y.o. 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  female who has been admitted to the hospital for swelling/anasarca. She was here about a year ago with similar complaints. She had nephrotic syndrome with normal renal function. A kidney bx showed AL amyloidosis. She was started on diuretics and RAAS blockade. We have attempted several times to get her set up with heme/onc for a BMBx but she never made the appointment. She has a hx of noncompliance and has not seen us in clinic in about a year as well. She was started on bumex and IV albumin. Her UOP is stable, but she still has sig edema. No cp or sob reported at this time.     Past Medical Hx:   Past Medical History:   Diagnosis Date    High cholesterol     Hypercholesterolemia     Hypertension     Other ill-defined conditions(999.08)     kidney stones        Past Surgical Hx:     Past Surgical History:   Procedure Laterality Date    HX APPENDECTOMY  1960    REMOVAL GALLBLADDER  11/14/2010       Medications:  Prior to Admission medications    Medication Sig Start Date End Date Taking? Authorizing Provider   furosemide (LASIX) 80 mg tablet Take 80 mg by mouth two (2) times a day. Yes Provider, Historical   fluticasone (FLONASE) 50 mcg/actuation nasal spray 1 [place] by Both Nostrils route daily. Other, MD Delgado   cholecalciferol (VITAMIN D3) 1,000 unit tablet Take 1,000 Units by mouth daily. Provider, Historical   metoprolol succinate (TOPROL XL) 50 mg XL tablet Take 150 mg by mouth daily. Provider, Historical       Allergies   Allergen Reactions    Seafood [Shellfish Containing Products] Swelling       Social Hx:  reports that she has quit smoking. she has never used smokeless tobacco. She reports that she drinks alcohol. She reports that she does not use drugs. Family History   Problem Relation Age of Onset    Heart Disease Father     Stroke Sister     Heart Disease Sister     Diabetes Sister        Review of Systems:  A twelve point review of system was performed today. Pertinent positives and negatives are mentioned in the HPI. The reminder of the ROS is negative and noncontributory. Objective:    Vitals:    Vitals:    10/19/18 0000 10/19/18 0454 10/19/18 0731 10/19/18 0756   BP: 142/82 137/81  144/80   Pulse: 99 100  (!) 101   Resp: 18 18  16   Temp: 98 °F (36.7 °C) 98.1 °F (36.7 °C)  97.7 °F (36.5 °C)   SpO2: 100% 100%  96%   Weight:   77.8 kg (171 lb 8.3 oz)    Height:         I&O's:  10/18 0701 - 10/19 0700  In: 240 [P.O.:240]  Out: 950 [Urine:700]  Visit Vitals  /80   Pulse (!) 101   Temp 97.7 °F (36.5 °C)   Resp 16   Ht 5' 4.5\" (1.638 m)   Wt 77.8 kg (171 lb 8.3 oz)   SpO2 96%   BMI 28.99 kg/m²       Physical Exam:  General:Alert, No distress,   HEENT: Eyes are PERRL. Conjunctiva without pallor ,erythema. The sclerae without icterus.  .   Neck:Supple,no mass palpable  Lungs : Clears to auscultation Bilaterally, Normal respiratory effort  CVS: RRR, S1 S2 normal, No rub,  2-3+ LE edema  Abdomen: Soft, Non tender, No hepatosplenomegaly, bowel sounds present  Extremities: No cyanosis, No clubbing  Skin: No rash or lesions. Lymph nodes: No palpable nodes  MS: No joint swelling, erythema, warmth  Neurologic: non focal, AAO x 3  Psych: normal affect    Laboratory Results:    Lab Results   Component Value Date    BUN 27 (H) 10/19/2018     10/19/2018    K 4.4 10/19/2018     10/19/2018    CO2 25 10/19/2018       Lab Results   Component Value Date    BUN 27 (H) 10/19/2018    BUN 28 (H) 10/18/2018    BUN 31 (H) 10/17/2018    BUN 12 11/29/2017    BUN 8 11/28/2017    K 4.4 10/19/2018    K 4.3 10/18/2018    K 4.5 10/17/2018    K 4.3 11/29/2017    K 4.3 11/28/2017       Lab Results   Component Value Date    WBC 8.2 10/18/2018    RBC 2.70 (L) 10/18/2018    HGB 8.6 (L) 10/18/2018    HCT 27.4 (L) 10/18/2018    .5 (H) 10/18/2018    MCH 31.9 10/18/2018    RDW 14.0 10/18/2018     10/18/2018       Lab Results   Component Value Date    PHOS 3.2 10/19/2018       Urine dipstick:   Lab Results   Component Value Date/Time    Color YELLOW/STRAW 10/17/2018 05:52 PM    Appearance TURBID (A) 10/17/2018 05:52 PM    Specific gravity 1.028 10/17/2018 05:52 PM    pH (UA) 5.0 10/17/2018 05:52 PM    Protein >300 (A) 10/17/2018 05:52 PM    Glucose NEGATIVE  10/17/2018 05:52 PM    Ketone NEGATIVE  10/17/2018 05:52 PM    Bilirubin NEGATIVE  10/17/2018 05:52 PM    Urobilinogen 1.0 10/17/2018 05:52 PM    Nitrites NEGATIVE  10/17/2018 05:52 PM    Leukocyte Esterase NEGATIVE  10/17/2018 05:52 PM    Epithelial cells FEW 10/17/2018 05:52 PM    Bacteria NEGATIVE  10/17/2018 05:52 PM    WBC 0-4 10/17/2018 05:52 PM    RBC 5-10 10/17/2018 05:52 PM       I have reviewed the following:    All pertinent labs, microbiology data, radiology imaging for my assessment           Thank you for allowing us to participate in the care of this patient. We will follow patient.  Please dont hesitate to call with any questions    Judy Chavez MD  10/19/2018    [facility]

## 2018-10-19 NOTE — CONSULTS
Psychiatry Consult Note    Subjective:   Patient:  Darci Jeffrey Age:  80 y.o. :  1930  Date of Evaluation:  10/19/2018    Reason for Referral:  Darci Jeffrey was admitted for le swelling. A Psych consult was requested for decision making capacity. History of Presenting Problem: This is a 51-year-old -American female with past medical history of hypertension, hyperlipidemia, kidney stones, gait abnormality, chronic leg edema, presented to the emergency department from home via EMS with reports of leg swelling. In the ED she was accompanied by her 2 sisters and a nephew. One of her sisters notes that the patient has had some confusion, change in her mental status, decreased mobility (per the initial ED report). The patient's sister and nephew confirmed the patient has had some questionable undiagnosed dementia as she has been increasingly more forgetful. According to the ER records, the patient was uncertain of the names of her medications. It was uncertain if she was actually taking the medications. She also reportedly had been confused, unable to state the president or the month or the year. Today the patient was alone in her room pleasant and cooperative. She presented with less cognitive impairment and was alert and oriented in all spheres. She was pleasantly confused. She reports she lives alone with Arturo Chappell and that the [employer] worker attends to her over the weekend. She was able to report that her son teaches in [place] in [place] and she has a granddaughter in [place]. She also reports she was born and raised in [place] and has live [facility] since . She was  to her  who passed away in . None of this information has been  collaborated. She was able to name present and past presidents. Had difficulty with serial 7s and recall.        Social History:   Social History     Socioeconomic History    Marital status:      Spouse name: Not on file    Number of children: Not on file    Years of education: Not on file    Highest education level: Not on file   Social Needs    Financial resource strain: Not on file    Food insecurity - worry: Not on file    Food insecurity - inability: Not on file    Transportation needs - medical: Not on file   [employer] needs - non-medical: Not on file   Occupational History    Not on file   Tobacco Use    Smoking status: Former Smoker    Smokeless tobacco: Never Used   Substance and Sexual Activity    Alcohol use: Yes     Comment: occassionally    Drug use: No    Sexual activity: Yes     Partners: Male     Birth control/protection: None   Other Topics Concern    Not on file   Social History Narrative    ** Merged History Encounter **            Legal: Unknown     Family History: Unknown    Family History   Problem Relation Age of Onset    Heart Disease Father     Stroke Sister     Heart Disease Sister     Diabetes Sister          Medical History:   Past Medical History:   Diagnosis Date    High cholesterol     Hypercholesterolemia     Hypertension     Other ill-defined conditions(539.89)     kidney stones       Psychiatric History: Denies      Substance Use History:   Social History     Substance and Sexual Activity   Alcohol Use Yes    Comment: occassionally     Social History     Substance and Sexual Activity   Drug Use No           Objective:     Vitals/Physical Assessment:    Patient Vitals for the past 8 hrs:   BP Temp Pulse Resp SpO2   10/19/18 1600     100 %   10/19/18 1503 141/81 97.6 °F (36.4 °C) 95 16 100 %   10/19/18 1238 144/76 97.3 °F (36.3 °C) 98 18 99 %           MENTAL STATUS EXAM:   FINDINGS WITHIN NORMAL LIMITS (WNL) UNLESS OTHERWISE STATED BELOW:    Sensorium  Alert & oriented to person place & time   Relations cooperative   Appearance:  Age appropriate   Motor Behavior:  WNL   Speech:  Normal but soft   Thought Process: linear   Thought Content Logical at times tangential   Suicidal ideations Denies   Homicidal ideations Denies   Mood:  ok   Affect:  Pleasant and euthymic   Memory recent  Poor   Memory remote:  Poor   Concentration:  difficulty noted   Abstraction:  poor   Insight:  limited   Reliability unknown   Judgment:  fair          Pertinant Data:  Patient Vitals for the past 8 hrs:   Temp Pulse Resp BP SpO2   10/19/18 1600     100 %   10/19/18 1503 97.6 °F (36.4 °C) 95 16 141/81 100 %   10/19/18 1238 97.3 °F (36.3 °C) 98 18 144/76 99 %       Recent Results (from the past 24 hour(s))   SAMPLES BEING HELD    Collection Time: 10/19/18  5:00 AM   Result Value Ref Range    SAMPLES BEING HELD 1RED 1PST     COMMENT        Add-on orders for these samples will be processed based on acceptable specimen integrity and analyte stability, which may vary by analyte. IRON PROFILE    Collection Time: 10/19/18  5:06 AM   Result Value Ref Range    Iron 24 (L) 35 - 150 ug/dL    TIBC 50 (L) 250 - 450 ug/dL    Iron % saturation 48 20 - 50 %   FERRITIN    Collection Time: 10/19/18  5:06 AM   Result Value Ref Range    Ferritin 678 (H) 8 - 252 NG/ML   RENAL FUNCTION PANEL    Collection Time: 10/19/18  5:06 AM   Result Value Ref Range    Sodium 142 136 - 145 mmol/L    Potassium 4.4 3.5 - 5.1 mmol/L    Chloride 108 97 - 108 mmol/L    CO2 25 21 - 32 mmol/L    Anion gap 9 5 - 15 mmol/L    Glucose 97 65 - 100 mg/dL    BUN 27 (H) 6 - 20 MG/DL    Creatinine 1.06 (H) 0.55 - 1.02 MG/DL    BUN/Creatinine ratio 25 (H) 12 - 20      GFR est AA 59 (L) >60 ml/min/1.73m2    GFR est non-AA 49 (L) >60 ml/min/1.73m2    Calcium 7.3 (L) 8.5 - 10.1 MG/DL    Phosphorus 3.2 2.6 - 4.7 MG/DL    Albumin 1.2 (L) 3.5 - 5.0 g/dL   PROTEIN/CREATININE RATIO, URINE    Collection Time: 10/19/18  7:28 AM   Result Value Ref Range    Protein, urine random 563 (H) 0.0 - 11.9 mg/dL    Creatinine, urine 47.60 mg/dL    Protein/Creat.  urine Ratio 11.8     NT-PRO BNP    Collection Time: 10/19/18  2:25 PM   Result Value Ref Range    NT pro-BNP 0,109 (H) 0 - 450 PG/ML   TROPONIN I    Collection Time: 10/19/18  2:25 PM   Result Value Ref Range    Troponin-I, Qt. 0.05 (H) <0.05 ng/mL     Xr Bone Survey Comp    Result Date: 10/19/2018  Clinical history: Myeloma Frontal and lateral views of the skull, frontal view of the chest, AP and lateral views of the cervical, thoracic, lumbar spine, frontal view of the pelvis, frontal views of the humerus and femur bilaterally were performed. There is a small left pleural effusion with left lower lobe atelectasis. Degenerative changes are noted throughout the cervical, thoracic, and lumbar spine. There is no identifiable lytic or blastic abnormality. Degenerative changes are seen in the hip joints bilaterally. Vascular calcification is noted. The bones are osteopenic. Degenerative changes are seen in the knees bilaterally. IMPRESSION: No lytic or blastic abnormality. Degenerative changes as above. Osteopenia.             Impression:      Principal Problem:    Altered mental status (10/17/2018)    Active Problems:    Dehydration (10/17/2018)      Unsteady gait (10/17/2018)      Bilateral leg edema (10/17/2018)          Plan:       Medications:  Current Facility-Administered Medications   Medication Dose Route Frequency    bumetanide (BUMEX) injection 2 mg  2 mg IntraVENous Q8H    losartan (COZAAR) tablet 25 mg  25 mg Oral DAILY    influenza vaccine 2018-19 (6 mos+)(PF) (FLUARIX QUAD/FLULAVAL QUAD) injection 0.5 mL  0.5 mL IntraMUSCular PRIOR TO DISCHARGE    albumin human 25% (BUMINATE) solution 12.5 g  12.5 g IntraVENous Q8H    sodium chloride (NS) flush 5-10 mL  5-10 mL IntraVENous Q8H    sodium chloride (NS) flush 5-10 mL  5-10 mL IntraVENous PRN        Scheduled Medications:   Current Facility-Administered Medications   Medication Dose Route Frequency    bumetanide (BUMEX) injection 2 mg  2 mg IntraVENous Q8H    losartan (COZAAR) tablet 25 mg  25 mg Oral DAILY    influenza vaccine 2018-19 (6 mos+)(PF) (FLUARIX QUAD/FLULAVAL QUAD) injection 0.5 mL  0.5 mL IntraMUSCular PRIOR TO DISCHARGE    albumin human 25% (BUMINATE) solution 12.5 g  12.5 g IntraVENous Q8H    sodium chloride (NS) flush 5-10 mL  5-10 mL IntraVENous Q8H          Recommendations for Treatment/Conditions: Psychiatry has been consulted for this 80year old female who presents pleasantly confused. Although she is relatively cognitively intact compared to the ER visit she continues to remain with memory problems. Could be AMS superimposed on underlying dementia. Clearly she is not capable of making any major decisions at this time. Again a general capacity to consent is a very broad category to comment given her age. Will follow up if needed.      Sohail Barriga MD  10/18/18  3:00 PM

## 2018-10-19 NOTE — PROGRESS NOTES
[facility] 
[address], [address] GI PROGRESS NOTE Deborah Lugo, [facility] office [phone] NP in-hospital cell phone M-F until 4:30 After 5pm or on weekends, please call  for physician on call NAME: Ginny Nicolas :  1930 MRN:  [id number] Subjective:  
Patient denies complaint - no signs of GI bleeding. Objective: VITALS:  
Last 24hrs VS reviewed since prior progress note. Most recent are: 
Visit Vitals /80 Pulse (!) 101 Temp 97.7 °F (36.5 °C) Resp 16 Ht 5' 4.5\" (1.638 m) Wt 77.8 kg (171 lb 8.3 oz) SpO2 96% BMI 28.99 kg/m² PHYSICAL EXAM: 
General: Cooperative, no acute distress   
Neurologic:  Alert and oriented X 2 HEENT: EOMI, no scleral icterus Lungs:  CTA bilaterally. No wheezing Heart:  S1 S2, regular rhythm, no murmur Abdomen: firm, distended, no tenderness. +Bowel sounds Extremities: +edema Psych:   Poor insight. Not anxious or agitated. Lab Data Reviewed:  
 
Recent Results (from the past 24 hour(s)) PROTHROMBIN TIME + INR Collection Time: 10/18/18  9:25 AM  
Result Value Ref Range INR 1.0 0.9 - 1.1 Prothrombin time 10.4 9.0 - 11.1 sec PTT Collection Time: 10/18/18  9:25 AM  
Result Value Ref Range aPTT 22.8 22.1 - 32.0 sec  
 aPTT, therapeutic range     58.0 - 82.3 SECS  
METABOLIC PANEL, COMPREHENSIVE Collection Time: 10/18/18  9:25 AM  
Result Value Ref Range Sodium 142 136 - 145 mmol/L Potassium 4.3 3.5 - 5.1 mmol/L Chloride 109 (H) 97 - 108 mmol/L  
 CO2 27 21 - 32 mmol/L Anion gap 6 5 - 15 mmol/L Glucose 80 65 - 100 mg/dL BUN 28 (H) 6 - 20 MG/DL Creatinine 0.95 0.55 - 1.02 MG/DL  
 BUN/Creatinine ratio 29 (H) 12 - 20 GFR est AA >60 >60 ml/min/1.73m2 GFR est non-AA 56 (L) >60 ml/min/1.73m2 Calcium 7.9 (L) 8.5 - 10.1 MG/DL  Bilirubin, total 0.2 0.2 - 1.0 MG/DL  
 ALT (SGPT) 9 (L) 12 - 78 U/L  
 AST (SGOT) 30 15 - 37 U/L Alk. phosphatase 99 45 - 117 U/L Protein, total 4.8 (L) 6.4 - 8.2 g/dL Albumin 0.8 (L) 3.5 - 5.0 g/dL Globulin 4.0 2.0 - 4.0 g/dL A-G Ratio 0.2 (L) 1.1 - 2.2    
CBC WITH AUTOMATED DIFF Collection Time: 10/18/18  9:25 AM  
Result Value Ref Range WBC 8.2 3.6 - 11.0 K/uL  
 RBC 2.70 (L) 3.80 - 5.20 M/uL HGB 8.6 (L) 11.5 - 16.0 g/dL HCT 27.4 (L) 35.0 - 47.0 % .5 (H) 80.0 - 99.0 FL  
 MCH 31.9 26.0 - 34.0 PG  
 MCHC 31.4 30.0 - 36.5 g/dL  
 RDW 14.0 11.5 - 14.5 % PLATELET 074 080 - 633 K/uL MPV 10.5 8.9 - 12.9 FL  
 NRBC 0.0 0  WBC ABSOLUTE NRBC 0.00 0.00 - 0.01 K/uL NEUTROPHILS 60 32 - 75 % LYMPHOCYTES 31 12 - 49 % MONOCYTES 7 5 - 13 % EOSINOPHILS 1 0 - 7 % BASOPHILS 0 0 - 1 % IMMATURE GRANULOCYTES 0 0.0 - 0.5 % ABS. NEUTROPHILS 4.9 1.8 - 8.0 K/UL  
 ABS. LYMPHOCYTES 2.6 0.8 - 3.5 K/UL  
 ABS. MONOCYTES 0.6 0.0 - 1.0 K/UL  
 ABS. EOSINOPHILS 0.1 0.0 - 0.4 K/UL  
 ABS. BASOPHILS 0.0 0.0 - 0.1 K/UL  
 ABS. IMM. GRANS. 0.0 0.00 - 0.04 K/UL  
 DF AUTOMATED PHOSPHORUS Collection Time: 10/18/18  9:25 AM  
Result Value Ref Range Phosphorus 2.8 2.6 - 4.7 MG/DL MAGNESIUM Collection Time: 10/18/18  9:25 AM  
Result Value Ref Range Magnesium 2.3 1.6 - 2.4 mg/dL LACTIC ACID Collection Time: 10/18/18  9:25 AM  
Result Value Ref Range Lactic acid 0.9 0.4 - 2.0 MMOL/L  
SAMPLES BEING HELD Collection Time: 10/19/18  5:00 AM  
Result Value Ref Range SAMPLES BEING HELD 1RED 1PST COMMENT Add-on orders for these samples will be processed based on acceptable specimen integrity and analyte stability, which may vary by analyte. IRON PROFILE Collection Time: 10/19/18  5:06 AM  
Result Value Ref Range Iron 24 (L) 35 - 150 ug/dL TIBC 50 (L) 250 - 450 ug/dL Iron % saturation 48 20 - 50 % FERRITIN Collection Time: 10/19/18  5:06 AM  
Result Value Ref Range Ferritin 678 (H) 8 - 252 NG/ML  
RENAL FUNCTION PANEL Collection Time: 10/19/18  5:06 AM  
Result Value Ref Range Sodium 142 136 - 145 mmol/L Potassium 4.4 3.5 - 5.1 mmol/L Chloride 108 97 - 108 mmol/L  
 CO2 25 21 - 32 mmol/L Anion gap 9 5 - 15 mmol/L Glucose 97 65 - 100 mg/dL BUN 27 (H) 6 - 20 MG/DL Creatinine 1.06 (H) 0.55 - 1.02 MG/DL  
 BUN/Creatinine ratio 25 (H) 12 - 20 GFR est AA 59 (L) >60 ml/min/1.73m2 GFR est non-AA 49 (L) >60 ml/min/1.73m2 Calcium 7.3 (L) 8.5 - 10.1 MG/DL Phosphorus 3.2 2.6 - 4.7 MG/DL Albumin 1.2 (L) 3.5 - 5.0 g/dL PROTEIN/CREATININE RATIO, URINE Collection Time: 10/19/18  7:28 AM  
Result Value Ref Range Protein, urine random 563 (H) 0.0 - 11.9 mg/dL Creatinine, urine 47.60 mg/dL Protein/Creat. urine Ratio 11.8 Assessment: · Chronic Anemia: hemoccult positive stool; hgb stable; no signs of active bleeding · Abdominal pain: resolved · Anasarca: nephrotic syndrome from renal amyloidosis Patient Active Problem List  
Diagnosis Code  Cholelithiasis with cholecystitis K80.10  Dizziness R42  ARF (acute renal failure) (HCC) N17.9  Proteinuria R80.9  Dehydration E86.0  Altered mental status R41.82  
 Unsteady gait R26.81  
 Bilateral leg edema R60.0 Plan: · Monitor CBC · Appreciate nephrology assistance, heme/onc consulted Signed By: Kolton Reilly NP   
 10/19/2018  9:08 AM 
  
 
 
I have examined the patient. I have reviewed the chart and agree with the documentation recorded by the NP, including the assessment, treatment plan, and disposition. No evidence of GI bleed Will follow as needed this weekend Florecita Hollis MD

## 2018-10-19 NOTE — PROGRESS NOTES
In response to code query, the medical record reflects the following clinical findings, treatment, and risk factors. Risk Factors:  adm with AMS and leg edema Clinical Indicators:  per H&P-Concern for decreased nutritional state,she looks malnourished and very frail, pt appears unkept, protein 5.0, albumin 0.6 Treatment: IVF, nutrition consult and lab monitoring This patient is being treated/managed for:  
 
=> Moderate protein-calorie malnutrition in the setting of malnourished and frail requiring IVF, nutrition consult, lab monitoring

## 2018-10-19 NOTE — PROGRESS NOTES
Orders received, chart reviewed and patient evaluated by occupational therapy. Recommend patient to discharge to SNF level rehab pending progression with skilled acute occupational therapy. Recommend with nursing patient to complete as able in order to maintain strength, endurance and independence: OOB to chair 3x/day, ADLs with supervision/setup and mobilizing to the bathroom for toileting with 2 person assist. Thank you for your assistance. Full evaluation to follow.

## 2018-10-19 NOTE — PROGRESS NOTES
Hospitalist Progress Note Batsheva Byers MD 
Answering service: [phone] or [phone] from in house phone Date of Service:  10/19/2018 NAME:  Raquel Ortiz :  1930 MRN:  [id number] Admission Summary:  
59-year-old -American female with past medical history of amyloidosis,chronic anasarca,hypertension, hyperlipidemia, kidney stones, gait abnormality, presented to the emergency department from home via EMS with reports of leg swelling. The patient notes her legs have been swollen \"for years. \" Family also noted some confusion, change in her mental status, decreased mobility (per the initial ED report). She was admitted for further evaluation. Per chart review,she was admitted to the hospital in 2017 for anasarca,found to have nephrotic range proteinuria,underwent kidney biopsy which showed amyloidosis. Per discussion with , she was not compliant with OP appointments and was at [facility] few months ago. Spoke to patient's son her only child, he is not aware of the diagnosis amyloidosis. He could only remember that patient was in hospital last year for edema. He is concerned about patient's home situation and asked about placement at discharge. Interval history / Subjective: This morning,she was pleasantly confused. States that fluid on her body still bothers her. Assessment & Plan: Anasarca: 
-due to low albumin from nephrotic range proteinuria. -will continue  IV bumex with IV albumin. 
-monitor creatinine and electrolytes closely while on diuretics. -echo grade 1 diastolic dysfunction,concentric hypertrophy. Nephrotic syndrome: 
-protein/creatinine ratio  11.9 
-due to untreated amyloidosis. -renal biopsy last year: 
 AL-amyloidosis, lambda light chain related with glomerular, interstitial and vascular involvement. Mild vascular sclerosis associated with mild interstitial fibrosis. -nephrology following Amyloidosis - AL:  
- untreated. -Hematology following - reviewed recommendations - lab work up:SPEP,UPEP,free light chain assessment. 
-Will need bone marrow biopsy. HTN: 
-on losartan. Left sided pleural effusion: 
-no resp issues now. 
-will continue diuretics. Anemia: 
-likely due to amyloidosis. -less likely GI related. No active blood loss. Code status: full DVT prophylaxis: SCDs. Care Plan discussed with: Patient/Family, nurse. Disposition: TBD Hospital Problems  Date Reviewed: 10/17/2018 Codes Class Noted POA Dehydration ICD-10-CM: E86.0 ICD-9-CM: 276.51  10/17/2018 Unknown * (Principal) Altered mental status ICD-10-CM: R41.82 
ICD-9-CM: 780.97  10/17/2018 Unknown Unsteady gait ICD-10-CM: R26.81 
ICD-9-CM: 781.2  10/17/2018 Unknown Bilateral leg edema ICD-10-CM: R60.0 ICD-9-CM: 782.3  10/17/2018 Unknown Review of Systems:  
Pertinent items are noted in HPI. Vital Signs:  
 Last 24hrs VS reviewed since prior progress note. Most recent are: 
Visit Vitals /81 (BP 1 Location: Right arm, BP Patient Position: At rest) Pulse 95 Temp 97.6 °F (36.4 °C) Resp 16 Ht 5' 4.5\" (1.638 m) Wt 77.8 kg (171 lb 8.3 oz) SpO2 100% BMI 28.99 kg/m² Intake/Output Summary (Last 24 hours) at 10/19/2018 1938 Last data filed at 10/19/2018 1600 Gross per 24 hour Intake 600 ml Output 2500 ml Net -1900 ml Physical Examination:  
 
 
     
Constitutional:  No acute distress, cooperative, pleasant   
ENT:  Oral mucous moist, oropharynx benign. Neck supple, Resp:  decreased breath sounds at bases. No wheezing/rhonchi/rales. No accessory muscle use CV:  Regular rhythm, normal rate, no murmurs, gallops, rubs GI:  Soft, non distended, non tender. normoactive bowel sounds,tense abdominal wall due to edema Musculoskeletal: Has tense 3+ edema of UE and LE. Has anasarca extending upto chest.  
 Neurologic:  Moves all extremities. AAO x 3,but confused/dementia. Data Review:  
 Review and/or order of clinical lab test 
Review and/or order of tests in the radiology section of CPT Review and/or order of tests in the medicine section of CPT Labs:  
 
Recent Labs 10/18/18 
9399 10/17/18 
1604 WBC 8.2 6.3 HGB 8.6* 9.3* HCT 27.4* 30.6*  221 Recent Labs 10/19/18 
2962 10/18/18 
1730 10/17/18 
1604  142 142  
K 4.4 4.3 4.5  
 109* 108 CO2 25 27 26 BUN 27* 28* 31* CREA 1.06* 0.95 1.14* GLU 97 80 102* CA 7.3* 7.9* 7.7* MG  --  2.3  --   
PHOS 3.2 2.8  --   
 
Recent Labs 10/19/18 
6818 10/18/18 
0245 10/17/18 
2054 10/17/18 
1604 SGOT  --  30  --  38* ALT  --  9*  --  11* AP  --  99  --  99  
TBILI  --  0.2  --  0.2 TP  --  4.8*  --  5.0* ALB 1.2* 0.8*  --  0.6*  
GLOB  --  4.0  --  4.4* LPSE  --   --  290  --   
 
Recent Labs 10/18/18 
1055 INR 1.0 PTP 10.4 APTT 22.8 Recent Labs 10/19/18 
0995 TIBC 50* PSAT 48 FERR 678* Lab Results Component Value Date/Time Folate 17.3 10/17/2018 10:33 PM  
  
No results for input(s): PH, PCO2, PO2 in the last 72 hours. Recent Labs 10/19/18 
1425 10/17/18 
2054 10/17/18 
1604 CPK  --   --  249* CKNDX  --   --  1.8  
TROIQ 0.05* <0.05  -- No results found for: CHOL, CHOLX, CHLST, CHOLV, HDL, LDL, LDLC, DLDLP, TGLX, TRIGL, TRIGP, CHHD, CHHDX Lab Results Component Value Date/Time Glucose (POC) 88 11/29/2017 04:35 PM  
 Glucose (POC) 113 (H) 11/29/2017 01:20 PM  
 Glucose (POC) 59 (L) 11/29/2017 12:47 PM  
 Glucose (POC) 52 (L) 11/29/2017 12:18 PM  
 Glucose (POC) 104 (H) 11/29/2017 05:37 AM  
 
Lab Results Component Value Date/Time  Color YELLOW/STRAW 10/17/2018 05:52 PM  
 Appearance TURBID (A) 10/17/2018 05:52 PM  
 Specific gravity 1.028 10/17/2018 05:52 PM  
 pH (UA) 5.0 10/17/2018 05:52 PM  
 Protein >300 (A) 10/17/2018 05:52 PM  
 Glucose NEGATIVE  10/17/2018 05:52 PM  
 Ketone NEGATIVE  10/17/2018 05:52 PM  
 Bilirubin NEGATIVE  10/17/2018 05:52 PM  
 Urobilinogen 1.0 10/17/2018 05:52 PM  
 Nitrites NEGATIVE  10/17/2018 05:52 PM  
 Leukocyte Esterase NEGATIVE  10/17/2018 05:52 PM  
 Epithelial cells FEW 10/17/2018 05:52 PM  
 Bacteria NEGATIVE  10/17/2018 05:52 PM  
 WBC 0-4 10/17/2018 05:52 PM  
 RBC 5-10 10/17/2018 05:52 PM  
 
 
 
Medications Reviewed:  
 
Current Facility-Administered Medications Medication Dose Route Frequency  bumetanide (BUMEX) injection 2 mg  2 mg IntraVENous Q8H  
 losartan (COZAAR) tablet 25 mg  25 mg Oral DAILY  influenza vaccine 2018-19 (6 mos+)(PF) (FLUARIX QUAD/FLULAVAL QUAD) injection 0.5 mL  0.5 mL IntraMUSCular PRIOR TO DISCHARGE  albumin human 25% (BUMINATE) solution 12.5 g  12.5 g IntraVENous Q8H  
 sodium chloride (NS) flush 5-10 mL  5-10 mL IntraVENous Q8H  
 sodium chloride (NS) flush 5-10 mL  5-10 mL IntraVENous PRN  
 
______________________________________________________________________ EXPECTED LENGTH OF STAY: 2d 14h ACTUAL LENGTH OF STAY:          2 Isabelle Nuno MD

## 2018-10-19 NOTE — CONSULTS
[facility] at [address], [address] [place], [address] Ma: [phone]  F: [phone]    Reason for Visit:   Vivian Santiago is a 80 y.o. female who is seen in consultation at the request of Dr. Ernesto Matute for evaluation of Amyloidosis    History of Present Illness:   Patient is a 80 y.o. female with PMH as below who was admitted on 10/17/18 when she presented to the emergency department with reports of lower extremity edema. She stated that this was chronic but family also noted some changes in her thinking ability and progressive decrease in mobility. She was thought to have anasarca and found to have a low serum albumin levels- 0.8. She was started on intravenous Bumex and IV albumin. 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 Both nephrology and hematology have been consulted for further evaluation and management. GI has been consulted as well as the patient was found to be anemic with a hemoglobin of 8.6 g/dL and had Hemoccult positive stools. He was apparently to follow-up at [facility] for amyloidosis which she was noncompliant with. The patient is by herself today. She is lying in bed and is quite pleasantly confused. She thinks she is being discharged today. She says she is doing really well. She says she has some leg swelling. Otherwise she does not think some anything was wrong with her. She denies any chest pain, shortness of breath, fevers, chills, nausea, diarrhea, changes in weight or appetite. She has not had any recent falls.     Past Medical History:   Diagnosis Date    High cholesterol     Hypercholesterolemia     Hypertension     Other ill-defined conditions(799.89)     kidney stones      Past Surgical History:   Procedure Laterality Date    HX APPENDECTOMY  1960    REMOVAL GALLBLADDER  11/14/2010      Social History Tobacco Use    Smoking status: Former Smoker    Smokeless tobacco: Never Used   Substance Use Topics    Alcohol use: Yes     Comment: occassionally      Family History   Problem Relation Age of Onset    Heart Disease Father     Stroke Sister     Heart Disease Sister     Diabetes Sister      Current Facility-Administered Medications   Medication Dose Route Frequency    bumetanide (BUMEX) injection 2 mg  2 mg IntraVENous Q8H    losartan (COZAAR) tablet 25 mg  25 mg Oral DAILY    albumin human 25% (BUMINATE) solution 12.5 g  12.5 g IntraVENous Q8H    sodium chloride (NS) flush 5-10 mL  5-10 mL IntraVENous Q8H    sodium chloride (NS) flush 5-10 mL  5-10 mL IntraVENous PRN      Allergies   Allergen Reactions    Seafood [Shellfish Containing Products] Swelling        Review of Systems: A complete review of systems was obtained, negative except as described above. Physical Exam:     Visit Vitals  /80   Pulse (!) 101   Temp 97.7 °F (36.5 °C)   Resp 16   Ht 5' 4.5\" (1.638 m)   Wt 171 lb 8.3 oz (77.8 kg)   SpO2 96%   BMI 28.99 kg/m²     ECOG PS: 3  General: Laying in bed, pleasantly confused  Eyes: PERRLA, anicteric sclerae  HENT: Atraumatic, OP clear  Neck: Supple  Lymphatic: No cervical, supraclavicular, or inguinal adenopathy  Respiratory: CTAB, normal respiratory effort  CV: Normal rate, regular rhythm, bilateral 3+ lower extremity edema  GI: Soft, distended and nontender  MS: Gait not assessed, digits without clubbing or cyanosis. Skin: No rashes, ecchymoses, or petechiae. Psych: Alert, confused with tangential conversation    Results:     Lab Results   Component Value Date/Time    WBC 8.2 10/18/2018 09:25 AM    HGB 8.6 (L) 10/18/2018 09:25 AM    HCT 27.4 (L) 10/18/2018 09:25 AM    PLATELET 979 68/94/9005 09:25 AM    .5 (H) 10/18/2018 09:25 AM    ABS.  NEUTROPHILS 4.9 10/18/2018 09:25 AM     Lab Results   Component Value Date/Time    Sodium 142 10/19/2018 05:06 AM    Potassium 4.4 10/19/2018 05:06 AM    Chloride 108 10/19/2018 05:06 AM    CO2 25 10/19/2018 05:06 AM    Glucose 97 10/19/2018 05:06 AM    BUN 27 (H) 10/19/2018 05:06 AM    Creatinine 1.06 (H) 10/19/2018 05:06 AM    GFR est AA 59 (L) 10/19/2018 05:06 AM    GFR est non-AA 49 (L) 10/19/2018 05:06 AM    Calcium 7.3 (L) 10/19/2018 05:06 AM    Glucose (POC) 88 11/29/2017 04:35 PM     Lab Results   Component Value Date/Time    Bilirubin, total 0.2 10/18/2018 09:25 AM    ALT (SGPT) 9 (L) 10/18/2018 09:25 AM    AST (SGOT) 30 10/18/2018 09:25 AM    Alk. phosphatase 99 10/18/2018 09:25 AM    Protein, total 4.8 (L) 10/18/2018 09:25 AM    Albumin 1.2 (L) 10/19/2018 05:06 AM    Globulin 4.0 10/18/2018 09:25 AM     Lab Results   Component Value Date/Time    Iron % saturation 48 10/19/2018 05:06 AM    TIBC 50 (L) 10/19/2018 05:06 AM    Ferritin 678 (H) 10/19/2018 05:06 AM    Vitamin B12 576 10/17/2018 10:33 PM    Folate 17.3 10/17/2018 10:33 PM    TSH 4.89 (H) 10/17/2018 08:54 PM    M-Isauro Not Observed 11/27/2017 06:32 PM    NICHOL, Direct NEGATIVE  11/28/2017 12:03 PM    Lipase 290 10/17/2018 08:54 PM    Hep C  virus Ab Interp. NONREACTIVE 11/28/2017 12:03 PM     Lab Results   Component Value Date/Time    INR 1.0 10/18/2018 09:25 AM    aPTT 22.8 10/18/2018 09:25 AM       Records reviewed and summarized above. Pathology report(s) reviewed       Kidney biopsy 11/29/17     Kidney, biopsy:   AL-amyloidosis, lambda light chain related with glomerular, interstitial and vascular involvement   Mild vascular sclerosis associated with mild interstitial fibrosis   See comment   Comment   Congo red and Thioflavin-T stains, as well as ultrastructural evaluation, confirm the diagnosis of amyloidosis     Radiology report(s) reviewed above. Assessment:   1) renal amyloidosis-AL with nephrotic range proteinuria    This was diagnosed in 11/2017. However patient did not follow through with recommendations.   She is now admitted with anasarca and hypoalbuminemia. Will obtain serum and urine protein electrophoresis as well as light chain assessment. She is 80 with a very poor performance status. However complete evaluation would include a bone marrow biopsy and assessment of organ function in general.    I suggest getting an echo of her heart as well and depending on findings we will talk to the family about bone marrow biopsy    2) anasarca  Due to severe hypo-albuminemia secondary to nephrotic range proteinuria. Management per nephrology and primary team    3) anemia-Macrocytic    Likely secondary to underlying plasma cell dyscrasia. Transfuse if less than 8 g/dL    4) altered mental status  Unclear etiology. Likely metabolic. Plan:     · SPEP, UPEP, free light chain assessment  · Skeletal survey  · ProBNP and troponin T  · Echo  · Bone marrow biopsy on Monday if family agrees      I appreciate the opportunity to participate in [address] [address].     Signed By: Tonny Bowling MD

## 2018-10-19 NOTE — TELEPHONE ENCOUNTER
Consult    Dallin President 12/29/1930    Untreated amyloidosis     Dr Jaya Gomez [facility]8 [address]

## 2018-10-19 NOTE — PROGRESS NOTES
Hospitalist Progress Note Marcin Velazquez MD 
Answering service: [phone] OR [phone] from in house phone Date of Service:  10/18/2018 NAME:  Antonina Cavazos :  1930 MRN:  [id number] Admission Summary:  
80-year-old -American female with past medical history of amyloidosis,chronic anasarca,hypertension, hyperlipidemia, kidney stones, gait abnormality, presented to the emergency department from home via EMS with reports of leg swelling. The patient notes her legs have been swollen \"for years. \" Family also noted some confusion, change in her mental status, decreased mobility (per the initial ED report). She was admitted for further evaluation. Interval history / Subjective:  
 Patient states that she has edema for the last one year and taking lasix. She has seen a doctor few months ago. Unable to tell me what was reason for edema and low albumin. Per chart review,she was admitted to the hospital in 2017 for anasarca,found to have nephrotic range proteinuria,underwent kidney biopsy which showed amyloidosis. Per discussion with , she was not compliant with OP appointments and was at [facility] few months ago. Spoke to patient's son her only child, he is not aware of the diagnosis amyloidosis. He could only remember that patient was in hospital last year for edema. He is concerned about patient's home situation and asked about placement at discharge. Assessment & Plan: Anasarca: 
-due to low albumin from nephrotic range proteinuria. -will start IV bumex with IV albumin. 
-monitor creatinine and electrolytes closely while on diuretics. Nephrotic syndrome: 
-protein/creatinine ratio last year was 11.6 
-due to untreated amyloidosis. -renal biopsy last year: 
 AL-amyloidosis, lambda light chain related with glomerular, interstitial and vascular involvement. Mild vascular sclerosis associated with mild interstitial fibrosis. -nephrology consulted - . Amyloidosis - AL:  
- untreated. -will consult hematology. HTN: 
-BP is normal now,monitor. Anemia: 
-likely due to amyloidosis. . 
-less likely GI related. No active blood loss. Code status: full DVT prophylaxis: SCDs. Care Plan discussed with: Patient/Family, nurse,. Spoke to son over phone. Disposition: TBD Hospital Problems  Date Reviewed: 10/17/2018 Codes Class Noted POA Dehydration ICD-10-CM: E86.0 ICD-9-CM: 276.51  10/17/2018 Unknown * (Principal) Altered mental status ICD-10-CM: R41.82 
ICD-9-CM: 780.97  10/17/2018 Unknown Unsteady gait ICD-10-CM: R26.81 
ICD-9-CM: 781.2  10/17/2018 Unknown Bilateral leg edema ICD-10-CM: R60.0 ICD-9-CM: 782.3  10/17/2018 Unknown Review of Systems:  
Pertinent items are noted in HPI. Vital Signs:  
 Last 24hrs VS reviewed since prior progress note. Most recent are: 
Visit Vitals /83 (BP 1 Location: Left arm, BP Patient Position: At rest) Pulse 90 Temp 97.8 °F (36.6 °C) Resp 18 Ht 5' 4.5\" (1.638 m) Wt 84.9 kg (187 lb 2.7 oz) SpO2 100% BMI 31.63 kg/m² Intake/Output Summary (Last 24 hours) at 10/18/2018 2013 Last data filed at 10/18/2018 1600 Gross per 24 hour Intake 240 ml Output 250 ml Net -10 ml Physical Examination:  
 
 
     
Constitutional:  No acute distress, cooperative, pleasant   
ENT:  Oral mucous moist, oropharynx benign. Neck supple, Resp:  CTA bilaterally. No wheezing/rhonchi/rales. No accessory muscle use CV:  Regular rhythm, normal rate, no murmurs, gallops, rubs GI:  Soft, non distended, non tender. normoactive bowel sounds,tense abdominal wall due to edema Musculoskeletal: Has tense 4+ edema of UE and LE. Has anasarca extending upto chest.  
 Neurologic:  Moves all extremities. AAO x 2-3, Data Review:  
 Review and/or order of clinical lab test 
 Review and/or order of tests in the radiology section of CPT Review and/or order of tests in the medicine section of CPT Review and summarization of old records and/or obtaining history from someone other than patient and/or discussion of case with , patient's son Labs:  
 
Recent Labs 10/18/18 
6968 10/17/18 
1604 WBC 8.2 6.3 HGB 8.6* 9.3* HCT 27.4* 30.6*  221 Recent Labs 10/18/18 
1852 10/17/18 
1604  142  
K 4.3 4.5  
* 108 CO2 27 26 BUN 28* 31* CREA 0.95 1.14* GLU 80 102* CA 7.9* 7.7* MG 2.3  --   
PHOS 2.8  --   
 
Recent Labs 10/18/18 
8380 10/17/18 
2054 10/17/18 
1604 SGOT 30  --  38* ALT 9*  --  11* AP 99  --  99  
TBILI 0.2  --  0.2 TP 4.8*  --  5.0* ALB 0.8*  --  0.6*  
GLOB 4.0  --  4.4* LPSE  --  290  --   
 
Recent Labs 10/18/18 
6967 INR 1.0 PTP 10.4 APTT 22.8 No results for input(s): FE, TIBC, PSAT, FERR in the last 72 hours. Lab Results Component Value Date/Time Folate 17.3 10/17/2018 10:33 PM  
  
No results for input(s): PH, PCO2, PO2 in the last 72 hours. Recent Labs 10/17/18 
2054 10/17/18 
1604 CPK  --  249* CKNDX  --  1.8  
TROIQ <0.05  -- No results found for: CHOL, CHOLX, CHLST, CHOLV, HDL, LDL, LDLC, DLDLP, TGLX, TRIGL, TRIGP, CHHD, CHHDX Lab Results Component Value Date/Time Glucose (POC) 88 11/29/2017 04:35 PM  
 Glucose (POC) 113 (H) 11/29/2017 01:20 PM  
 Glucose (POC) 59 (L) 11/29/2017 12:47 PM  
 Glucose (POC) 52 (L) 11/29/2017 12:18 PM  
 Glucose (POC) 104 (H) 11/29/2017 05:37 AM  
 
Lab Results Component Value Date/Time  Color YELLOW/STRAW 10/17/2018 05:52 PM  
 Appearance TURBID (A) 10/17/2018 05:52 PM  
 Specific gravity 1.028 10/17/2018 05:52 PM  
 pH (UA) 5.0 10/17/2018 05:52 PM  
 Protein >300 (A) 10/17/2018 05:52 PM  
 Glucose NEGATIVE  10/17/2018 05:52 PM  
 Ketone NEGATIVE  10/17/2018 05:52 PM  
 Bilirubin NEGATIVE  10/17/2018 05:52 PM  
 Urobilinogen 1.0 10/17/2018 05:52 PM  
 Nitrites NEGATIVE  10/17/2018 05:52 PM  
 Leukocyte Esterase NEGATIVE  10/17/2018 05:52 PM  
 Epithelial cells FEW 10/17/2018 05:52 PM  
 Bacteria NEGATIVE  10/17/2018 05:52 PM  
 WBC 0-4 10/17/2018 05:52 PM  
 RBC 5-10 10/17/2018 05:52 PM  
 
 
 
Medications Reviewed:  
 
Current Facility-Administered Medications Medication Dose Route Frequency  bumetanide (BUMEX) injection 1 mg  1 mg IntraVENous Q8H  
 albumin human 25% (BUMINATE) solution 12.5 g  12.5 g IntraVENous Q8H  
 sodium chloride (NS) flush 5-10 mL  5-10 mL IntraVENous Q8H  
 sodium chloride (NS) flush 5-10 mL  5-10 mL IntraVENous PRN  
 
______________________________________________________________________ EXPECTED LENGTH OF STAY: 2d 14h ACTUAL LENGTH OF STAY:          1 Monica Cortés MD

## 2018-10-19 NOTE — PROGRESS NOTES
Speech Pathology bedside swallow evaluation/discharge Patient: Nika Mccoy (70 y.o. female) Date: 10/19/2018 Primary Diagnosis: Bilateral leg edema Unsteady gait Dehydration Altered mental status Precautions:     
 
ASSESSMENT : 
Based on the objective data described below, the patient presents with intact oropharyngeal dysphagia. Patient demonstrated mildly slow but complete mastication of solid likely related to missing teeth. Patient tolerated all trials without overt s/s aspiration. Patient at low risk for aspiration. Recommend advance diet up to regular consistency per MD.   
Skilled therapy provided by a speech-language pathologist is not indicated at this time. PLAN : 
Recommendations: 
Up to regular consistency diet per MD 
Thin liquids Medication as tolerated No further SLP treatment warranted Discharge Recommendations: None SUBJECTIVE:  
Patient stated Can I get four of those (pillows? ). Patient reports no difficulty swallowing. OBJECTIVE:  
 
Past Medical History:  
Diagnosis Date  High cholesterol  Hypercholesterolemia  Hypertension  Other ill-defined conditions(799.89)   
 kidney stones Past Surgical History:  
Procedure Laterality Date [address]  REMOVAL GALLBLADDER  11/14/2010 Prior Level of Function/Home Situation:  
 Diet prior to admission: Regular Current Diet:  GI lite Cognitive and Communication Status: 
Neurologic State: Alert Orientation Level: Oriented to person, Oriented to place, Oriented to time Cognition: Follows commands Perception: Appears intact Perseveration: Perseverates during conversation Oral Assessment: 
Oral Assessment Labial: No impairment Dentition: Natural;Extractions; Poor Oral Hygiene: Mois and clean oral mucosa Lingual: No impairment Velum: Unable to visualize Mandible: No impairment P.O. Trials: 
Patient Position: Upright in bed Vocal quality prior to P.O.: No impairment Consistency Presented: Thin liquid;Puree; Solid How Presented: Self-fed/presented;Cup/sip;Spoon Bolus Acceptance: No impairment Bolus Formation/Control: No impairment Propulsion: No impairment Oral Residue: None Initiation of Swallow: No impairment Laryngeal Elevation: Functional 
Aspiration Signs/Symptoms: None Pharyngeal Phase Characteristics: No impairment, issues, or problems Pharyngeal Phase Severity : No impairment NOMS:  
The NOMS functional outcome measure was used to quantify this patient's level of swallowing impairment. Based on the NOMS, the patient was determined to be at level 6 for swallow function G Codes: In compliance with CMSs Claims Based Outcome Reporting, the following G-code set was chosen for this patient based the use of the NOMS functional outcome to quantify this patient's level of swallowing impairment. Using the NOMS, the patient was determined to be at level 6 for swallow function which correlates with the CI= 1-19% level of severity. Based on the objective assessment provided within this note, the current, goal, and discharge g-codes are as follows: 
 
Swallow  Swallowing: 
 Swallow Current Status CI= 1-19%  Swallow Goal Status CI= 1-19%  Swallow D/C Status CI= 1-19% NOMS Swallowing Levels: 
Level 1 (CN): NPO Level 2 (CM): NPO but takes consistency in therapy Level 3 (CL): Takes less than 50% of nutrition p.o. and continues with nonoral feedings; and/or safe with mod cues; and/or max diet restriction Level 4 (CK): Safe swallow but needs mod cues; and/or mod diet restriction; and/or still requires some nonoral feeding/supplements Level 5 (CJ): Safe swallow with min diet restriction; and/or needs min cues Level 6 (CI): Independent with p.o.; rare cues; usually self cues; may need to avoid some foods or needs extra time Level 7 (CH): Independent for all p.o. CHLOÉ. (2003). National Outcomes Measurement System (NOMS): Adult Speech-Language Pathology User's Guide. Pain: 
Pain Scale 1: Numeric (0 - 10) Pain Intensity 1: 0 After treatment:  
[] Patient left in no apparent distress sitting up in chair 
[x] Patient left in no apparent distress in bed 
[x] Call bell left within reach 
[] Nursing notified 
[] Caregiver present 
[] Bed alarm activated COMMUNICATION/EDUCATION:  
The patients plan of care including findings, recommendations, and recommended diet changes were discussed with: Registered Nurse. Patient educated on role of SLP and POC. Patient verbalized understanding. [] Posted safety precautions in patient's room. [] Patient/family have participated as able and agree with findings and recommendations. [] Patient is unable to participate in plan of care at this time. Thank you for this referral. 
MUNIR Matson Time Calculation: 15 mins

## 2018-10-19 NOTE — PROGRESS NOTES
Problem: Mobility Impaired (Adult and Pediatric) Goal: *Acute Goals and Plan of Care (Insert Text) Physical Therapy Goals Initiated 10/19/2018 1. Patient will move from supine to sit and sit to supine , scoot up and down and roll side to side in bed with supervision/set-up within 7 day(s). 2.  Patient will transfer from bed to chair and chair to bed with supervision/set-up using the least restrictive device within 7 day(s). 3.  Patient will perform sit to stand with supervision/set-up within 7 day(s). 4.  Patient will ambulate with supervision/set-up for 150 feet with the least restrictive device within 7 day(s). physical Therapy EVALUATION Patient: Antonina Cavazos (9 y.o. female) Date: 10/19/2018 Primary Diagnosis: Bilateral leg edema Unsteady gait Dehydration Altered mental status Precautions:   Fall ASSESSMENT : 
Based on the objective data described below, the patient presents with significant decline in funcional mobility independence and tolerance relative to baseline due to generalized weakness, impaired standing balance, gait dysfunction, B knee ROM restriction and OA, and poor activity tolerance and safety awareness s/p admission for B LE edema and AMS. Patient agreeable to therapy evaluation but stating that she is unsure if she really needs assistance. Requiring CGA to MOD A x 1-2 persons for all functional mobility. Demonstrated poor safety with RW with patient leaning far anteriorly with walker advanced very far ahead of her COG, difficulty noted with negotiating turns and small spaces as well, needed max cues. MOD A to come to standing with heavy reliance on RW for support and knee ROM restriction limiting efforts as well. Patient lives alone in a 2-level home with 4 EVETTE, vague with whether she uses a SPC or RW or any device but endorses that she owns them.   Notes that she is unable to go up to the second level where her bath and bedroom are located and has not for approx 1 year. Body habitus is notable for appearance consistent with lack of sufficient self-care. Son lives in [facility] expresses concern his mother is unable to care for herself any longer. Patient with significant lack of safety awareness and realistic appraisal of her abilities. Recommend PT while in acute setting and SNF at d/c as patient is very unsafe to return home alone with HIGH fall risk per Tinetti. Patient will benefit from skilled intervention to address the above impairments. Patients rehabilitation potential is considered to be Good Factors which may influence rehabilitation potential include:  
[]         None noted 
[x]         Mental ability/status [x]         Medical condition 
[x]         Home/family situation and support systems 
[x]         Safety awareness 
[]         Pain tolerance/management 
[]         Other: PLAN : 
Recommendations and Planned Interventions: 
[x]           Bed Mobility Training             [x]    Neuromuscular Re-Education 
[x]           Transfer Training                   []    Orthotic/Prosthetic Training 
[x]           Gait Training                         []    Modalities [x]           Therapeutic Exercises           []    Edema Management/Control 
[x]           Therapeutic Activities            [x]    Patient and Family Training/Education 
[]           Other (comment): Frequency/Duration: Patient will be followed by physical therapy  5 times a week to address goals. Discharge Recommendations: Chava Sims Further Equipment Recommendations for Discharge: TBD SUBJECTIVE:  
Patient stated I do okay for myself.  OBJECTIVE DATA SUMMARY:  
HISTORY:   
Past Medical History:  
Diagnosis Date  High cholesterol  Hypercholesterolemia  Hypertension  Other ill-defined conditions(599.89)   
 kidney stones Past Surgical History:  
Procedure Laterality Date [facility]  REMOVAL GALLBLADDER  11/14/2010 Prior Level of Function/Home Situation: lives alone in 2-level home, cannot get to bed/bath on second level, mod indep with RW or SPC PTA Personal factors and/or comorbidities impacting plan of care: limited family support in [facility],  knee OA Home Situation Home Environment: Private residence # Steps to Enter: 4 Rails to Enter: Yes Hand Rails : Bilateral 
One/Two Story Residence: Two story Living Alone: Yes Support Systems: Child(jose) Patient Expects to be Discharged to[de-identified] Private residence Current DME Used/Available at Home: Jacqueline Romina, straight, Walker, rolling, Transfer bench, Commode, bedside Tub or Shower Type: Tub/Shower combination EXAMINATION/PRESENTATION/DECISION MAKING: Critical Behavior: 
Neurologic State: Alert Orientation Level: Oriented to person, Oriented to situation, Oriented to place Cognition: Poor safety awareness, Decreased attention/concentration, Impaired decision making Safety/Judgement: Lack of insight into deficits Hearing: Auditory Auditory Impairment: NoneSkin:  See nursing, skin/dirt flaking off in bed from all over body Edema:  B LEs Range Of Motion: 
AROM: Generally decreased, functional 
  
  
  
  
  
  
  
Strength:   
Strength: Generally decreased, functional 
  
  
  
  
  
  
Tone & Sensation:  
Tone: Normal 
  
  
  
  
Sensation: Intact Coordination: 
Coordination: Generally decreased, functional 
Vision:  
  
Functional Mobility: 
Bed Mobility: 
  
Supine to Sit: Contact guard assistance Transfers: 
Sit to Stand: Moderate assistance;Assist x2 Stand to Sit: Moderate assistance Bed to Chair: Moderate assistance;Assist x2 Balance:  
Sitting: Intact; With support; Without support Standing: Impaired Standing - Static: Fair Standing - Dynamic : PoorAmbulation/Gait Training:Distance (ft): 40 Feet (ft) Assistive Device: Gait belt;Walker, rolling Ambulation - Level of Assistance: Minimal assistance;Assist x1;Additional time; Adaptive equipment Gait Abnormalities: Decreased step clearance(increased trunk flexion) Base of Support: Center of gravity altered Speed/Vanda: Slow Step Length: Right shortened;Left shortened Functional Measure: 
Tinetti test: 
 
Sitting Balance: 1 Arises: 0 Attempts to Rise: 0 Immediate Standing Balance: 0 Standing Balance: 1 Nudged: 0 Eyes Closed: 0 Turn 360 Degrees - Continuous/Discontinuous: 0 Turn 360 Degrees - Steady/Unsteady: 0 Sitting Down: 1 Balance Score: 3 Indication of Gait: 0 
R Step Length/Height: 1 L Step Length/Height: 1 
R Foot Clearance: 1 L Foot Clearance: 1 Step Symmetry: 1 Step Continuity: 0 Path: 1 Trunk: 1 Walking Time: 0 Gait Score: 7 Total Score: 10 Tinetti Test and G-code impairment scale: 
Percentage of Impairment CH 
 
0% 
 CI 
 
1-19% CJ 
 
20-39% CK 
 
40-59% CL 
 
60-79% CM 
 
80-99% CN  
 
100% Tinetti Score 0-28 28 23-27 17-22 12-16 6-11 1-5 0 Tinetti Tool Score Risk of Falls 
<19 = High Fall Risk 19-24 = Moderate Fall Risk 25-28 = Low Fall Risk Tinetti ME. Performance-Oriented Assessment of Mobility Problems in Elderly Patients. Pool 66; [id number]. (Scoring Description: PT Bulletin Feb. 10, 1993) Older adults: Vijaya Cordero et al, 2009; n = 1601 [facility] THUBIT elderly evaluated with ABC, NANCI, ADL, and IADL) · Mean NANCI score for males aged 69-68 years = 26.21(3.40) · Mean NANCI score for females age 69-68 years = 25.16(4.30) · Mean NANCI score for males over 80 years = 23.29(6.02) · Mean NANCI score for females over 80 years = 17.20(8.32) G codes: In compliance with CMSs Claims Based Outcome Reporting, the following G-code set was chosen for this patient based on their primary functional limitation being treated:  
 
The outcome measure chosen to determine the severity of the functional limitation was the Tientti with a score of 10/28 which was correlated with the impairment scale. ? Mobility - Walking and Moving Around:  
  - CURRENT STATUS: CL - 60%-79% impaired, limited or restricted  - GOAL STATUS: CK - 40%-59% impaired, limited or restricted  - D/C STATUS:  ---------------To be determined--------------- Physical Therapy Evaluation Charge Determination History Examination Presentation Decision-Making HIGH Complexity :3+ comorbidities / personal factors will impact the outcome/ POC  MEDIUM Complexity : 3 Standardized tests and measures addressing body structure, function, activity limitation and / or participation in recreation  MEDIUM Complexity : Evolving with changing characteristics  Other outcome measures Tinetti 10/28  HIGH Based on the above components, the patient evaluation is determined to be of the following complexity level: MEDIUM Pain: 
Pain Scale 1: Numeric (0 - 10) Pain Intensity 1: 0 Activity Tolerance: Low - 40' to from the bathroom Please refer to the flowsheet for vital signs taken during this treatment. After treatment:  
[x]         Patient left in no apparent distress sitting up in chair 
[]         Patient left in no apparent distress in bed 
[x]         Call bell left within reach [x]         Nursing notified 
[]         Caregiver present 
[]         Bed alarm activated COMMUNICATION/EDUCATION:  
The patients plan of care was discussed with: Registered Nurse. [x]         Fall prevention education was provided and the patient/caregiver indicated understanding. []         Patient/family have participated as able in goal setting and plan of care. [x]         Patient/family agree to work toward stated goals and plan of care. []         Patient understands intent and goals of therapy, but is neutral about his/her participation. []         Patient is unable to participate in goal setting and plan of care. Thank you for this referral. 
Noelle Singh, PT Time Calculation: 36 mins

## 2018-10-20 LAB
ANION GAP SERPL CALC-SCNC: 7 MMOL/L (ref 5–15)
BUN SERPL-MCNC: 26 MG/DL (ref 6–20)
BUN/CREAT SERPL: 24 (ref 12–20)
CALCIUM SERPL-MCNC: 7.7 MG/DL (ref 8.5–10.1)
CHLORIDE SERPL-SCNC: 105 MMOL/L (ref 97–108)
CO2 SERPL-SCNC: 28 MMOL/L (ref 21–32)
CREAT SERPL-MCNC: 1.09 MG/DL (ref 0.55–1.02)
GLUCOSE SERPL-MCNC: 79 MG/DL (ref 65–100)
POTASSIUM SERPL-SCNC: 4 MMOL/L (ref 3.5–5.1)
SODIUM SERPL-SCNC: 140 MMOL/L (ref 136–145)

## 2018-10-20 PROCEDURE — 80048 BASIC METABOLIC PNL TOTAL CA: CPT | Performed by: FAMILY MEDICINE

## 2018-10-20 PROCEDURE — P9047 ALBUMIN (HUMAN), 25%, 50ML: HCPCS | Performed by: HOSPITALIST

## 2018-10-20 PROCEDURE — 74011250636 HC RX REV CODE- 250/636: Performed by: FAMILY MEDICINE

## 2018-10-20 PROCEDURE — 84155 ASSAY OF PROTEIN SERUM: CPT | Performed by: INTERNAL MEDICINE

## 2018-10-20 PROCEDURE — 74011000250 HC RX REV CODE- 250: Performed by: INTERNAL MEDICINE

## 2018-10-20 PROCEDURE — 36415 COLL VENOUS BLD VENIPUNCTURE: CPT | Performed by: INTERNAL MEDICINE

## 2018-10-20 PROCEDURE — 83883 ASSAY NEPHELOMETRY NOT SPEC: CPT | Performed by: INTERNAL MEDICINE

## 2018-10-20 PROCEDURE — 90471 IMMUNIZATION ADMIN: CPT

## 2018-10-20 PROCEDURE — 74011250637 HC RX REV CODE- 250/637: Performed by: INTERNAL MEDICINE

## 2018-10-20 PROCEDURE — 74011250636 HC RX REV CODE- 250/636: Performed by: HOSPITALIST

## 2018-10-20 PROCEDURE — 65660000000 HC RM CCU STEPDOWN

## 2018-10-20 PROCEDURE — 90686 IIV4 VACC NO PRSV 0.5 ML IM: CPT | Performed by: FAMILY MEDICINE

## 2018-10-20 PROCEDURE — 82784 ASSAY IGA/IGD/IGG/IGM EACH: CPT | Performed by: INTERNAL MEDICINE

## 2018-10-20 PROCEDURE — 86335 IMMUNFIX E-PHORSIS/URINE/CSF: CPT | Performed by: INTERNAL MEDICINE

## 2018-10-20 RX ADMIN — ALBUMIN (HUMAN) 12.5 G: 0.25 INJECTION, SOLUTION INTRAVENOUS at 18:24

## 2018-10-20 RX ADMIN — Medication 10 ML: at 21:03

## 2018-10-20 RX ADMIN — BUMETANIDE 2 MG: 0.25 INJECTION INTRAMUSCULAR; INTRAVENOUS at 10:02

## 2018-10-20 RX ADMIN — ALBUMIN (HUMAN) 12.5 G: 0.25 INJECTION, SOLUTION INTRAVENOUS at 10:03

## 2018-10-20 RX ADMIN — ALBUMIN (HUMAN) 12.5 G: 0.25 INJECTION, SOLUTION INTRAVENOUS at 03:06

## 2018-10-20 RX ADMIN — ENOXAPARIN SODIUM 40 MG: 40 INJECTION SUBCUTANEOUS at 21:02

## 2018-10-20 RX ADMIN — INFLUENZA VIRUS VACCINE 0.5 ML: 15; 15; 15; 15 SUSPENSION INTRAMUSCULAR at 19:28

## 2018-10-20 RX ADMIN — LOSARTAN POTASSIUM 25 MG: 25 TABLET, FILM COATED ORAL at 10:03

## 2018-10-20 RX ADMIN — BUMETANIDE 2 MG: 0.25 INJECTION INTRAMUSCULAR; INTRAVENOUS at 18:24

## 2018-10-20 RX ADMIN — BUMETANIDE 2 MG: 0.25 INJECTION INTRAMUSCULAR; INTRAVENOUS at 03:08

## 2018-10-20 NOTE — PROGRESS NOTES
Hospitalist Progress Note Tyler Almaraz MD 
Answering service: [phone] OR [phone] from in house phone Date of Service:  10/20/2018 NAME:  Yessi Best :  1930 MRN:  [id number] Admission Summary:  
63-year-old -American female with past medical history of amyloidosis,chronic anasarca,hypertension, hyperlipidemia, kidney stones, gait abnormality, presented to the emergency department from home via EMS with reports of leg swelling. The patient notes her legs have been swollen \"for years. \" Family also noted some confusion, change in her mental status, decreased mobility (per the initial ED report). She was admitted for further evaluation. Per chart review,she was admitted to the hospital in 2017 for anasarca,found to have nephrotic range proteinuria,underwent kidney biopsy which showed amyloidosis. Per discussion with , she was not compliant with OP appointments and was at [address] [place] [address] few months ago. Spoke to patient's son her only child, he is not aware of the diagnosis amyloidosis. He could only remember that patient was in hospital last year for edema. He is concerned about patient's home situation and asked about placement at discharge. Interval history / Subjective:  
  
Patient is pleasantly confused, denied any pain today. Assessment & Plan: Anasarca: 
-due to low albumin from nephrotic range proteinuria. -will continue  IV bumex with IV albumin - -ve 2.1 lt today. 
-monitor creatinine and electrolytes closely while on diuretics. -echo grade 1 diastolic dysfunction,concentric hypertrophy -? Related to amlydoidosis. Nephrotic syndrome: 
-protein/creatinine ratio  11.9 
-due to untreated amyloidosis. -renal biopsy last year: 
 AL-amyloidosis, lambda light chain related with glomerular, interstitial and vascular involvement. Mild vascular sclerosis associated with mild interstitial fibrosis. -nephrology following Amyloidosis - AL:  
- untreated. -Hematology following - reviewed recommendations - lab work up:SPEP,UPEP,free light chain assessment. 
-Will need bone marrow biopsy if family wants to pursue further workup,. HTN: 
-on losartan. Left sided pleural effusion: 
-no resp issues now. 
-will continue diuretics. Anemia: 
-likely due to amyloidosis. -less likely GI related. No active blood loss. Code status: full DVT prophylaxis: SCDs. Care Plan discussed with: Patient/Family, nurse. Disposition: TBD Hospital Problems  Date Reviewed: 10/17/2018 Codes Class Noted POA Dehydration ICD-10-CM: E86.0 ICD-9-CM: 276.51  10/17/2018 Unknown * (Principal) Altered mental status ICD-10-CM: R41.82 
ICD-9-CM: 780.97  10/17/2018 Unknown Unsteady gait ICD-10-CM: R26.81 
ICD-9-CM: 781.2  10/17/2018 Unknown Bilateral leg edema ICD-10-CM: R60.0 ICD-9-CM: 782.3  10/17/2018 Unknown Review of Systems:  
Pertinent items are noted in HPI. Vital Signs:  
 Last 24hrs VS reviewed since prior progress note. Most recent are: 
Visit Vitals /82 (BP 1 Location: Right arm, BP Patient Position: At rest;Supine) Pulse 98 Temp 98.3 °F (36.8 °C) Resp 14 Ht 5' 4.5\" (1.638 m) Wt 66.9 kg (147 lb 6.4 oz) SpO2 97% BMI 24.91 kg/m² Intake/Output Summary (Last 24 hours) at 10/20/2018 1545 Last data filed at 10/20/2018 1214 Gross per 24 hour Intake 690 ml Output 900 ml Net -210 ml Physical Examination:  
 
 
     
Constitutional:  No acute distress, cooperative, pleasant   
ENT:  Oral mucous moist, oropharynx benign. Neck supple, Resp:  decreased breath sounds at bases. No wheezing/rhonchi/rales. No accessory muscle use CV:  Regular rhythm, normal rate, no murmurs, gallops, rubs GI:  Soft,  distended, non tender. normoactive bowel sounds,tense abdominal wall due to edema Musculoskeletal: Has tense 3+ edema of UE and LE. Has anasarca extending upto chest.  
 Neurologic:  Moves all extremities. AAO x 3,but confused/dementia. Data Review:  
 Review and/or order of clinical lab test 
Review and/or order of tests in the medicine section of [facility] Labs:  
 
Recent Labs 10/18/18 
4479 10/17/18 
1604 WBC 8.2 6.3 HGB 8.6* 9.3* HCT 27.4* 30.6*  221 Recent Labs 10/20/18 
6615 10/19/18 
5411 10/18/18 
6566  142 142  
K 4.0 4.4 4.3  108 109* CO2 28 25 27 BUN 26* 27* 28* CREA 1.09* 1.06* 0.95 GLU 79 97 80  
CA 7.7* 7.3* 7.9*  
MG  --   --  2.3 PHOS  --  3.2 2.8 Recent Labs 10/19/18 
0696 10/18/18 
9888 10/17/18 
2054 10/17/18 
1604 SGOT  --  30  --  38* ALT  --  9*  --  11* AP  --  99  --  99  
TBILI  --  0.2  --  0.2 TP  --  4.8*  --  5.0* ALB 1.2* 0.8*  --  0.6*  
GLOB  --  4.0  --  4.4* LPSE  --   --  290  --   
 
Recent Labs 10/18/18 
4805 INR 1.0 PTP 10.4 APTT 22.8 Recent Labs 10/19/18 
8654 TIBC 50* PSAT 48 FERR 678* Lab Results Component Value Date/Time Folate 17.3 10/17/2018 10:33 PM  
  
No results for input(s): PH, PCO2, PO2 in the last 72 hours. Recent Labs 10/19/18 
1425 10/17/18 
2054 10/17/18 
1604 CPK  --   --  249* CKNDX  --   --  1.8  
TROIQ 0.05* <0.05  -- No results found for: CHOL, CHOLX, CHLST, CHOLV, HDL, LDL, LDLC, DLDLP, TGLX, TRIGL, TRIGP, CHHD, CHHDX Lab Results Component Value Date/Time Glucose (POC) 88 11/29/2017 04:35 PM  
 Glucose (POC) 113 (H) 11/29/2017 01:20 PM  
 Glucose (POC) 59 (L) 11/29/2017 12:47 PM  
 Glucose (POC) 52 (L) 11/29/2017 12:18 PM  
 Glucose (POC) 104 (H) 11/29/2017 05:37 AM  
 
Lab Results Component Value Date/Time  Color YELLOW/STRAW 10/17/2018 05:52 PM  
 Appearance TURBID (A) 10/17/2018 05:52 PM  
 Specific gravity 1.028 10/17/2018 05:52 PM  
 pH (UA) 5.0 10/17/2018 05:52 PM  
 Protein >300 (A) 10/17/2018 05:52 PM  
 Glucose NEGATIVE  10/17/2018 05:52 PM  
 Ketone NEGATIVE  10/17/2018 05:52 PM  
 Bilirubin NEGATIVE  10/17/2018 05:52 PM  
 Urobilinogen 1.0 10/17/2018 05:52 PM  
 Nitrites NEGATIVE  10/17/2018 05:52 PM  
 Leukocyte Esterase NEGATIVE  10/17/2018 05:52 PM  
 Epithelial cells FEW 10/17/2018 05:52 PM  
 Bacteria NEGATIVE  10/17/2018 05:52 PM  
 WBC 0-4 10/17/2018 05:52 PM  
 RBC 5-10 10/17/2018 05:52 PM  
 
 
 
Medications Reviewed:  
 
Current Facility-Administered Medications Medication Dose Route Frequency  bumetanide (BUMEX) injection 2 mg  2 mg IntraVENous Q8H  
 losartan (COZAAR) tablet 25 mg  25 mg Oral DAILY  influenza vaccine 2018-19 (6 mos+)(PF) (FLUARIX QUAD/FLULAVAL QUAD) injection 0.5 mL  0.5 mL IntraMUSCular PRIOR TO DISCHARGE  enoxaparin (LOVENOX) injection 40 mg  40 mg SubCUTAneous Q24H  
 albumin human 25% (BUMINATE) solution 12.5 g  12.5 g IntraVENous Q8H  
 sodium chloride (NS) flush 5-10 mL  5-10 mL IntraVENous Q8H  
 sodium chloride (NS) flush 5-10 mL  5-10 mL IntraVENous PRN  
 
______________________________________________________________________ EXPECTED LENGTH OF STAY: 2d 14h ACTUAL LENGTH OF STAY:          3 Natalia Biswas [place]

## 2018-10-20 NOTE — PROGRESS NOTES
CM noted consult and met with patient to discuss SNF choice. Patient reports that she and her son have not yet had this discussion but they will soon. CM asked patient if CM could call her son; patient agreed. CM contacted patient's son Bobby Feldman in patient's room to discuss the need to obtain SNF choice. Marquis Chaidez reports that he has some places in mind that he wants to check out and requested to contact CM later today (lives in [place], [place]). Patient's nurse reports that patient still has a lot of fluid and will not be ready for discharge before Monday. CM to monitor. Vickie Lacey MS 
 
 
3:40pm CM received return phone call from patient's son Bobby Feldman. Per Marquis Chaidez, he would like for his mother to be discharged to an RIO vs a SNF. He reports that his mother gets 56 a month in social security benefits and a pension; patient also owns her own home. Marquis Chaidez has plans to visit [facility] ([address]) as this would be closer to the patient's family who would have easy access to her as he resides in [place] as a . Marquis Chaidez states that his mother will not agree to a nursing facility so he is willing to have her discharge to an RIO for a while as he agrees she can not return home. CM to monitor.   
 
Vickie Lacey, MS

## 2018-10-20 NOTE — PROGRESS NOTES
Patient is resting in bed she complains of being very tired and wants to be left alone no other complaints at this time. 2300 patient is sleeping resting comfortable in bed no complaints at this time. 0300 blood drawn and sent to lab patient was fed a meal and her 24 hour urine was started. 0730 Bedside and Verbal shift change report given to Jeremiah Oates  (oncoming nurse) by Claudia Bee RN  (offgoing nurse). Report included the following information SBAR, MAR and Recent Results.

## 2018-10-20 NOTE — PROGRESS NOTES
Hospitalist Progress Note Lakshmi Vail MD 
Answering service: [phone] OR [phone] from in house phone Date of Service:  10/20/2018 NAME:  Rose Marie Hughes :  1930 MRN:  [id number] Admission Summary:  
20-year-old -American female with past medical history of amyloidosis,chronic anasarca,hypertension, hyperlipidemia, kidney stones, gait abnormality, presented to the emergency department from home via EMS with reports of leg swelling. The patient notes her legs have been swollen \"for years. \" Family also noted some confusion, change in her mental status, decreased mobility (per the initial ED report). She was admitted for further evaluation. Per chart review,she was admitted to the hospital in 2017 for anasarca,found to have nephrotic range proteinuria,underwent kidney biopsy which showed amyloidosis. Per discussion with , she was not compliant with OP appointments and was at [address] [place] [address] few months ago. Spoke to patient's son her only child, he is not aware of the diagnosis amyloidosis. He could only remember that patient was in hospital last year for edema. He is concerned about patient's home situation and asked about placement at discharge. Interval history / Subjective:  
  
Patient is pleasantly confused, denied any pain today. Assessment & Plan: Anasarca: 
-due to low albumin from nephrotic range proteinuria. -will continue  IV bumex with IV albumin - -ve 2.1 lt today. 
-monitor creatinine and electrolytes closely while on diuretics. -echo grade 1 diastolic dysfunction,concentric hypertrophy -? Related to amlydoidosis. Nephrotic syndrome: 
-protein/creatinine ratio  11.9 
-due to untreated amyloidosis. -renal biopsy last year: 
 AL-amyloidosis, lambda light chain related with glomerular, interstitial and vascular involvement. Mild vascular sclerosis associated with mild interstitial fibrosis. -nephrology following Amyloidosis - AL:  
- untreated. -Hematology following - reviewed recommendations - lab work up:SPEP,UPEP,free light chain assessment. 
-Will need bone marrow biopsy if family wants to pursue further workup,. HTN: 
-on losartan. Left sided pleural effusion: 
-no resp issues now. 
-will continue diuretics. Anemia: 
-likely due to amyloidosis. -less likely GI related. No active blood loss. Code status: full DVT prophylaxis: SCDs. Care Plan discussed with: Patient/Family, nurse. Discussed with the son over phone- [phone](Oleg[facility] Gaston [facility]) Disposition: D Hospital Problems  Date Reviewed: 10/17/2018 Codes Class Noted POA Dehydration ICD-10-CM: E86.0 ICD-9-CM: 276.51  10/17/2018 Unknown * (Principal) Altered mental status ICD-10-CM: R41.82 
ICD-9-CM: 780.97  10/17/2018 Unknown Unsteady gait ICD-10-CM: R26.81 
ICD-9-CM: 781.2  10/17/2018 Unknown Bilateral leg edema ICD-10-CM: R60.0 ICD-9-CM: 782.3  10/17/2018 Unknown Review of Systems:  
Pertinent items are noted in HPI. Vital Signs:  
 Last 24hrs VS reviewed since prior progress note. Most recent are: 
Visit Vitals /82 (BP 1 Location: Right arm, BP Patient Position: At rest;Supine) Pulse 98 Temp 98.3 °F (36.8 °C) Resp 14 Ht 5' 4.5\" (1.638 m) Wt 66.9 kg (147 lb 6.4 oz) SpO2 97% BMI 24.91 kg/m² Intake/Output Summary (Last 24 hours) at 10/20/2018 1842 Last data filed at 10/20/2018 1553 Gross per 24 hour Intake 810 ml Output 1000 ml Net -190 ml Physical Examination:  
 
 
     
Constitutional:  No acute distress, cooperative, pleasant   
ENT:  Oral mucous moist, oropharynx benign. Neck supple, Resp:  decreased breath sounds at bases. No wheezing/rhonchi/rales. No accessory muscle use CV:  Regular rhythm, normal rate, no murmurs, gallops, rubs GI:  Soft,  distended, non tender. normoactive bowel sounds,tense abdominal wall due to edema Musculoskeletal: Has tense 3+ edema of UE and LE. Has anasarca extending upto chest.  
 Neurologic:  Moves all extremities. AAO x 3,but confused/dementia. Data Review:  
 Review and/or order of clinical lab test 
Review and/or order of tests in the medicine section of [facility] Labs:  
 
Recent Labs 10/18/18 
9017 WBC 8.2 HGB 8.6* HCT 27.4*  
 Recent Labs 10/20/18 
1235 10/19/18 
4635 10/18/18 
0187  142 142  
K 4.0 4.4 4.3  108 109* CO2 28 25 27 BUN 26* 27* 28* CREA 1.09* 1.06* 0.95 GLU 79 97 80  
CA 7.7* 7.3* 7.9*  
MG  --   --  2.3 PHOS  --  3.2 2.8 Recent Labs 10/19/18 
3341 10/18/18 
7926 10/17/18 
2054 SGOT  --  30  --   
ALT  --  9*  --   
AP  --  99  --   
TBILI  --  0.2  --   
TP  --  4.8*  --   
ALB 1.2* 0.8*  --   
GLOB  --  4.0  --   
LPSE  --   --  290 Recent Labs 10/18/18 
4832 INR 1.0 PTP 10.4 APTT 22.8 Recent Labs 10/19/18 
6225 TIBC 50* PSAT 48 FERR 678* Lab Results Component Value Date/Time Folate 17.3 10/17/2018 10:33 PM  
  
No results for input(s): PH, PCO2, PO2 in the last 72 hours. Recent Labs 10/19/18 
1425 10/17/18 
2054 TROIQ 0.05* <0.05 No results found for: CHOL, CHOLX, CHLST, CHOLV, HDL, LDL, LDLC, DLDLP, TGLX, TRIGL, TRIGP, CHHD, CHHDX Lab Results Component Value Date/Time Glucose (POC) 88 11/29/2017 04:35 PM  
 Glucose (POC) 113 (H) 11/29/2017 01:20 PM  
 Glucose (POC) 59 (L) 11/29/2017 12:47 PM  
 Glucose (POC) 52 (L) 11/29/2017 12:18 PM  
 Glucose (POC) 104 (H) 11/29/2017 05:37 AM  
 
Lab Results Component Value Date/Time  Color YELLOW/STRAW 10/17/2018 05:52 PM  
 Appearance TURBID (A) 10/17/2018 05:52 PM  
 Specific gravity 1.028 10/17/2018 05:52 PM  
 pH (UA) 5.0 10/17/2018 05:52 PM  
 Protein >300 (A) 10/17/2018 05:52 PM  
 Glucose NEGATIVE  10/17/2018 05:52 PM  
 Ketone NEGATIVE  10/17/2018 05:52 PM  
 Bilirubin NEGATIVE  10/17/2018 05:52 PM  
 Urobilinogen 1.0 10/17/2018 05:52 PM  
 Nitrites NEGATIVE  10/17/2018 05:52 PM  
 Leukocyte Esterase NEGATIVE  10/17/2018 05:52 PM  
 Epithelial cells FEW 10/17/2018 05:52 PM  
 Bacteria NEGATIVE  10/17/2018 05:52 PM  
 WBC 0-4 10/17/2018 05:52 PM  
 RBC 5-10 10/17/2018 05:52 PM  
 
 
 
Medications Reviewed:  
 
Current Facility-Administered Medications Medication Dose Route Frequency  influenza vaccine 2018-19 (6 mos+)(PF) (FLUARIX QUAD/FLULAVAL QUAD) injection 0.5 mL  0.5 mL IntraMUSCular ONCE  
 bumetanide (BUMEX) injection 2 mg  2 mg IntraVENous Q8H  
 losartan (COZAAR) tablet 25 mg  25 mg Oral DAILY  enoxaparin (LOVENOX) injection 40 mg  40 mg SubCUTAneous Q24H  
 albumin human 25% (BUMINATE) solution 12.5 g  12.5 g IntraVENous Q8H  
 sodium chloride (NS) flush 5-10 mL  5-10 mL IntraVENous Q8H  
 sodium chloride (NS) flush 5-10 mL  5-10 mL IntraVENous PRN  
 
______________________________________________________________________ EXPECTED LENGTH OF STAY: 2d 14h ACTUAL LENGTH OF STAY:          3 Batsheva Byers MD

## 2018-10-20 NOTE — PROGRESS NOTES
Bedside and Verbal shift change report given to Arnaud Dougherty (oncoming nurse) by Sunshine Childs (offgoing nurse). Report included the following information SBAR, Med Rec Status and Cardiac Rhythm sinus rhythm.

## 2018-10-21 LAB
ALBUMIN SERPL-MCNC: 1.7 G/DL (ref 3.5–5)
ANION GAP SERPL CALC-SCNC: 7 MMOL/L (ref 5–15)
BASOPHILS # BLD: 0 K/UL (ref 0–0.1)
BASOPHILS NFR BLD: 1 % (ref 0–1)
BUN SERPL-MCNC: 28 MG/DL (ref 6–20)
BUN/CREAT SERPL: 24 (ref 12–20)
CALCIUM SERPL-MCNC: 7.9 MG/DL (ref 8.5–10.1)
CHLORIDE SERPL-SCNC: 104 MMOL/L (ref 97–108)
CO2 SERPL-SCNC: 30 MMOL/L (ref 21–32)
CREAT SERPL-MCNC: 1.18 MG/DL (ref 0.55–1.02)
DIFFERENTIAL METHOD BLD: ABNORMAL
EOSINOPHIL # BLD: 0.1 K/UL (ref 0–0.4)
EOSINOPHIL NFR BLD: 2 % (ref 0–7)
ERYTHROCYTE [DISTWIDTH] IN BLOOD BY AUTOMATED COUNT: 14.2 % (ref 11.5–14.5)
GLUCOSE SERPL-MCNC: 96 MG/DL (ref 65–100)
HCT VFR BLD AUTO: 25 % (ref 35–47)
HGB BLD-MCNC: 7.7 G/DL (ref 11.5–16)
IMM GRANULOCYTES # BLD: 0 K/UL (ref 0–0.04)
IMM GRANULOCYTES NFR BLD AUTO: 0 % (ref 0–0.5)
LYMPHOCYTES # BLD: 2.3 K/UL (ref 0.8–3.5)
LYMPHOCYTES NFR BLD: 35 % (ref 12–49)
MCH RBC QN AUTO: 32 PG (ref 26–34)
MCHC RBC AUTO-ENTMCNC: 30.8 G/DL (ref 30–36.5)
MCV RBC AUTO: 103.7 FL (ref 80–99)
MONOCYTES # BLD: 0.5 K/UL (ref 0–1)
MONOCYTES NFR BLD: 7 % (ref 5–13)
NEUTS SEG # BLD: 3.7 K/UL (ref 1.8–8)
NEUTS SEG NFR BLD: 56 % (ref 32–75)
NRBC # BLD: 0 K/UL (ref 0–0.01)
NRBC BLD-RTO: 0 PER 100 WBC
PHOSPHATE SERPL-MCNC: 2.8 MG/DL (ref 2.6–4.7)
PLATELET # BLD AUTO: 221 K/UL (ref 150–400)
PMV BLD AUTO: 10 FL (ref 8.9–12.9)
POTASSIUM SERPL-SCNC: 4 MMOL/L (ref 3.5–5.1)
RBC # BLD AUTO: 2.41 M/UL (ref 3.8–5.2)
SODIUM SERPL-SCNC: 141 MMOL/L (ref 136–145)
WBC # BLD AUTO: 6.7 K/UL (ref 3.6–11)

## 2018-10-21 PROCEDURE — 74011000250 HC RX REV CODE- 250: Performed by: NURSE PRACTITIONER

## 2018-10-21 PROCEDURE — 93005 ELECTROCARDIOGRAM TRACING: CPT

## 2018-10-21 PROCEDURE — 74011000250 HC RX REV CODE- 250: Performed by: INTERNAL MEDICINE

## 2018-10-21 PROCEDURE — P9047 ALBUMIN (HUMAN), 25%, 50ML: HCPCS | Performed by: HOSPITALIST

## 2018-10-21 PROCEDURE — 74011000250 HC RX REV CODE- 250: Performed by: HOSPITALIST

## 2018-10-21 PROCEDURE — 74011250636 HC RX REV CODE- 250/636: Performed by: HOSPITALIST

## 2018-10-21 PROCEDURE — 80069 RENAL FUNCTION PANEL: CPT | Performed by: HOSPITALIST

## 2018-10-21 PROCEDURE — 85025 COMPLETE CBC W/AUTO DIFF WBC: CPT | Performed by: HOSPITALIST

## 2018-10-21 PROCEDURE — 74011250637 HC RX REV CODE- 250/637: Performed by: INTERNAL MEDICINE

## 2018-10-21 PROCEDURE — 74011250637 HC RX REV CODE- 250/637: Performed by: NURSE PRACTITIONER

## 2018-10-21 PROCEDURE — 36415 COLL VENOUS BLD VENIPUNCTURE: CPT | Performed by: HOSPITALIST

## 2018-10-21 PROCEDURE — 65270000032 HC RM SEMIPRIVATE

## 2018-10-21 RX ORDER — ALBUMIN HUMAN 250 G/1000ML
12.5 SOLUTION INTRAVENOUS EVERY 12 HOURS
Status: DISCONTINUED | OUTPATIENT
Start: 2018-10-21 | End: 2018-10-22

## 2018-10-21 RX ORDER — ACETAMINOPHEN 325 MG/1
650 TABLET ORAL
Status: DISCONTINUED | OUTPATIENT
Start: 2018-10-21 | End: 2018-10-24 | Stop reason: HOSPADM

## 2018-10-21 RX ORDER — BUMETANIDE 0.25 MG/ML
2 INJECTION INTRAMUSCULAR; INTRAVENOUS EVERY 12 HOURS
Status: DISCONTINUED | OUTPATIENT
Start: 2018-10-21 | End: 2018-10-22

## 2018-10-21 RX ADMIN — Medication 10 ML: at 04:55

## 2018-10-21 RX ADMIN — Medication 10 ML: at 22:22

## 2018-10-21 RX ADMIN — BUMETANIDE 2 MG: 0.25 INJECTION INTRAMUSCULAR; INTRAVENOUS at 02:27

## 2018-10-21 RX ADMIN — LOSARTAN POTASSIUM 25 MG: 25 TABLET, FILM COATED ORAL at 09:40

## 2018-10-21 RX ADMIN — ALBUMIN (HUMAN) 12.5 G: 0.25 INJECTION, SOLUTION INTRAVENOUS at 02:29

## 2018-10-21 RX ADMIN — ALBUMIN (HUMAN) 12.5 G: 0.25 INJECTION, SOLUTION INTRAVENOUS at 12:21

## 2018-10-21 RX ADMIN — ACETAMINOPHEN 650 MG: 325 TABLET, FILM COATED ORAL at 23:06

## 2018-10-21 RX ADMIN — LIDOCAINE HYDROCHLORIDE 40 ML: 20 SOLUTION ORAL; TOPICAL at 23:06

## 2018-10-21 RX ADMIN — Medication 10 ML: at 14:35

## 2018-10-21 RX ADMIN — ALBUMIN (HUMAN) 12.5 G: 0.25 INJECTION, SOLUTION INTRAVENOUS at 20:32

## 2018-10-21 RX ADMIN — BUMETANIDE 2 MG: 0.25 INJECTION INTRAMUSCULAR; INTRAVENOUS at 20:42

## 2018-10-21 RX ADMIN — ENOXAPARIN SODIUM 40 MG: 40 INJECTION SUBCUTANEOUS at 20:42

## 2018-10-21 RX ADMIN — BUMETANIDE 2 MG: 0.25 INJECTION INTRAMUSCULAR; INTRAVENOUS at 12:17

## 2018-10-21 NOTE — PROGRESS NOTES
Patient is resting in bed she is very tired and just wants to sleep. She has her pure wick in and her output her 24 hour urine is still in process. Will continue to monitor patient for changes in her condition. 2300 patient is resting in bed son at the bedside.

## 2018-10-21 NOTE — PROGRESS NOTES
TRANSFER - OUT REPORT: 
 
Verbal report given to Mera FELDER(name) on Justin Burns  being transferred to 6 E(unit) for routine progression of care Report consisted of patients Situation, Background, Assessment and  
Recommendations(SBAR). Information from the following report(s) SBAR, Kardex, Intake/Output, MAR, Recent Results and Cardiac Rhythm NSR was reviewed with the receiving nurse. Lines:  
Peripheral IV 10/17/18 Right Hand (Active) Site Assessment Clean, dry, & intact 10/21/2018  4:41 AM  
Phlebitis Assessment 0 10/21/2018  4:41 AM  
Infiltration Assessment 0 10/21/2018  4:41 AM  
Dressing Status Clean, dry, & intact 10/21/2018  4:41 AM  
Dressing Type Tape;Transparent 10/21/2018  4:41 AM  
Hub Color/Line Status Blue 10/21/2018  4:41 AM  
Action Taken Open ports on tubing capped 10/21/2018  4:41 AM  
Alcohol Cap Used Yes 10/21/2018  4:41 AM  
  
 
Opportunity for questions and clarification was provided. Patient transported with: 
 [employer]

## 2018-10-21 NOTE — PROGRESS NOTES
Hospitalist Progress Note Samuel Segura MD 
Answering service: [phone] OR [phone] from in house phone Date of Service:  10/21/2018 NAME:  Justin Burns :  1930 MRN:  [id number] Admission Summary:  
80-year-old -American female with past medical history of amyloidosis,chronic anasarca,hypertension, hyperlipidemia, kidney stones, gait abnormality, presented to the emergency department from home via EMS with reports of leg swelling. The patient notes her legs have been swollen \"for years. \" Family also noted some confusion, change in her mental status, decreased mobility (per the initial ED report). She was admitted for further evaluation. Per chart review,she was admitted to the hospital in 2017 for anasarca,found to have nephrotic range proteinuria,underwent kidney biopsy which showed amyloidosis. Per discussion with , she was not compliant with OP appointments and was at [facility] few months ago. Spoke to patient's son her only child, he is not aware of the diagnosis amyloidosis. He could only remember that patient was in hospital last year for edema. He is concerned about patient's home situation and asked about placement at discharge. Interval history / Subjective:  
  
States \" I feel Ok\". Assessment & Plan: Anasarca: 
-due to low albumin from nephrotic range proteinuria. -will continue  IV bumex with IV albumin. 
-Will change to 2 mg BID as BUN/creat increasing. 
-monitor creatinine and electrolytes closely while on diuretics. -echo grade 1 diastolic dysfunction,concentric hypertrophy -? Related to amlydoidosis. Nephrotic syndrome: 
-protein/creatinine ratio  11.9 
-due to untreated amyloidosis. -renal biopsy last year: 
 AL-amyloidosis, lambda light chain related with glomerular, interstitial and vascular involvement. Mild vascular sclerosis associated with mild interstitial fibrosis. -nephrology following Amyloidosis - AL:  
- untreated. -Hematology following - reviewed recommendations - lab work up:SPEP,UPEP,free light chain assessment- pending results 
-Will need bone marrow biopsy if family wants to pursue further workup. 
-Discussed with son at bedside about amyloidosis and the need for work up if they decide to pursue treatment. [facility] He did not make any decision yet. -Palliative care consulted to discuss further goals of care. HTN: 
-on losartan. Left sided pleural effusion: 
-no resp issues now. 
-will continue diuretics. Anemia: 
-likely due to amyloidosis. -less likely GI related. No active blood loss. Code status: full DVT prophylaxis: SCDs. Care Plan discussed with: Patient/Family, nurse. Discussed with the son at bedside Disposition: D Hospital Problems  Date Reviewed: 10/17/2018 Codes Class Noted POA Dehydration ICD-10-CM: E86.0 ICD-9-CM: 276.51  10/17/2018 Unknown * (Principal) Altered mental status ICD-10-CM: R41.82 
ICD-9-CM: 780.97  10/17/2018 Unknown Unsteady gait ICD-10-CM: R26.81 
ICD-9-CM: 781.2  10/17/2018 Unknown Bilateral leg edema ICD-10-CM: R60.0 ICD-9-CM: 782.3  10/17/2018 Unknown Review of Systems:  
Pertinent items are noted in HPI. Vital Signs:  
 Last 24hrs VS reviewed since prior progress note. Most recent are: 
Visit Vitals /67 (BP 1 Location: Right arm, BP Patient Position: At rest) Pulse 98 Temp 98.9 °F (37.2 °C) Resp 20 Ht 5' 4.5\" (1.638 m) Wt 69 kg (152 lb 1.9 oz) SpO2 99% BMI 25.71 kg/m² Intake/Output Summary (Last 24 hours) at 10/21/2018 1750 Last data filed at 10/21/2018 1130 Gross per 24 hour Intake 240 ml Output 350 ml Net -110 ml Physical Examination:  
 
 
     
Constitutional:  No acute distress, cooperative, pleasant   
 ENT:  Oral mucous moist, oropharynx benign. Neck supple, Resp:  decreased breath sounds at bases. No wheezing/rhonchi/rales. No accessory muscle use CV:  Regular rhythm, normal rate, no murmurs, gallops, rubs GI:  Soft,  distended, non tender. normoactive bowel sounds,tense abdominal wall due to edema Musculoskeletal: Has tense 3+ edema of UE and LE. Has anasarca extending upto chest.  
 Neurologic:  Moves all extremities. AAO x 3,but confused/dementia. Data Review:  
 Review and/or order of clinical lab test 
Review and/or order of tests in the medicine section of [facility] Labs:  
 
Recent Labs 10/21/18 
0500 WBC 6.7 HGB 7.7* HCT 25.0*  
 Recent Labs 10/21/18 
9248 10/20/18 
4890 10/19/18 
4651  140 142  
K 4.0 4.0 4.4  105 108 CO2 30 28 25 BUN 28* 26* 27* CREA 1.18* 1.09* 1.06* GLU 96 79 97  
CA 7.9* 7.7* 7.3*  
PHOS 2.8  --  3.2 Recent Labs 10/21/18 
5695 10/19/18 
5910 ALB 1.7* 1.2* No results for input(s): INR, PTP, APTT in the last 72 hours. No lab exists for component: INREXT, INREXT Recent Labs 10/19/18 
5920 TIBC 50* PSAT 48 FERR 678* Lab Results Component Value Date/Time Folate 17.3 10/17/2018 10:33 PM  
  
No results for input(s): PH, PCO2, PO2 in the last 72 hours. Recent Labs 10/19/18 
1425 TROIQ 0.05* No results found for: CHOL, CHOLX, CHLST, CHOLV, HDL, LDL, LDLC, DLDLP, TGLX, TRIGL, TRIGP, CHHD, CHHDX Lab Results Component Value Date/Time Glucose (POC) 88 11/29/2017 04:35 PM  
 Glucose (POC) 113 (H) 11/29/2017 01:20 PM  
 Glucose (POC) 59 (L) 11/29/2017 12:47 PM  
 Glucose (POC) 52 (L) 11/29/2017 12:18 PM  
 Glucose (POC) 104 (H) 11/29/2017 05:37 AM  
 
Lab Results Component Value Date/Time  Color YELLOW/STRAW 10/17/2018 05:52 PM  
 Appearance TURBID (A) 10/17/2018 05:52 PM  
 Specific gravity 1.028 10/17/2018 05:52 PM  
 pH (UA) 5.0 10/17/2018 05:52 PM  
 Protein >300 (A) 10/17/2018 05:52 PM  
 Glucose NEGATIVE  10/17/2018 05:52 PM  
 Ketone NEGATIVE  10/17/2018 05:52 PM  
 Bilirubin NEGATIVE  10/17/2018 05:52 PM  
 Urobilinogen 1.0 10/17/2018 05:52 PM  
 Nitrites NEGATIVE  10/17/2018 05:52 PM  
 Leukocyte Esterase NEGATIVE  10/17/2018 05:52 PM  
 Epithelial cells FEW 10/17/2018 05:52 PM  
 Bacteria NEGATIVE  10/17/2018 05:52 PM  
 WBC 0-4 10/17/2018 05:52 PM  
 RBC 5-10 10/17/2018 05:52 PM  
 
 
 
Medications Reviewed:  
 
Current Facility-Administered Medications Medication Dose Route Frequency  bumetanide (BUMEX) injection 2 mg  2 mg IntraVENous Q12H  
 albumin human 25% (BUMINATE) solution 12.5 g  12.5 g IntraVENous Q12H  
 losartan (COZAAR) tablet 25 mg  25 mg Oral DAILY  enoxaparin (LOVENOX) injection 40 mg  40 mg SubCUTAneous Q24H  
 sodium chloride (NS) flush 5-10 mL  5-10 mL IntraVENous Q8H  
 sodium chloride (NS) flush 5-10 mL  5-10 mL IntraVENous PRN  
 
______________________________________________________________________ EXPECTED LENGTH OF STAY: 2d 14h ACTUAL LENGTH OF STAY:          4 Anoop Thompson MD

## 2018-10-21 NOTE — PROGRESS NOTES
Problem: Pressure Injury - Risk of 
Goal: *Prevention of pressure injury Document Tristin Scale and appropriate interventions in the flowsheet. Outcome: Progressing Towards Goal 
Pressure Injury Interventions: 
Sensory Interventions: Assess changes in LOC, Keep linens dry and wrinkle-free Moisture Interventions: Absorbent underpads, Apply protective barrier, creams and emollients, Maintain skin hydration (lotion/cream), Offer toileting Q_hr Activity Interventions: Increase time out of bed, Pressure redistribution bed/mattress(bed type) Mobility Interventions: HOB 30 degrees or less, Pressure redistribution bed/mattress (bed type) Nutrition Interventions: Document food/fluid/supplement intake, Offer support with meals,snacks and hydration Friction and Shear Interventions: Apply protective barrier, creams and emollients, Lift sheet, Minimize layers Problem: Falls - Risk of 
Goal: *Absence of Falls Document Shauna Miller Fall Risk and appropriate interventions in the flowsheet. Outcome: Progressing Towards Goal 
Fall Risk Interventions: 
Mobility Interventions: Communicate number of staff needed for ambulation/transfer, Patient to call before getting OOB Mentation Interventions: Adequate sleep, hydration, pain control, Door open when patient unattended, Increase mobility, Toileting rounds, Update white board Medication Interventions: Patient to call before getting OOB, Teach patient to arise slowly Elimination Interventions: Call light in reach, Patient to call for help with toileting needs History of Falls Interventions: Consult care management for discharge planning

## 2018-10-21 NOTE — PROGRESS NOTES
Problem: Pressure Injury - Risk of 
Goal: *Prevention of pressure injury Document Tristin Scale and appropriate interventions in the flowsheet. Outcome: Progressing Towards Goal 
Pressure Injury Interventions: 
Sensory Interventions: Assess changes in LOC, Float heels, Keep linens dry and wrinkle-free, Pressure redistribution bed/mattress (bed type) Moisture Interventions: Internal/External urinary devices Activity Interventions: Increase time out of bed, Pressure redistribution bed/mattress(bed type) Mobility Interventions: HOB 30 degrees or less, Pressure redistribution bed/mattress (bed type) Nutrition Interventions: Document food/fluid/supplement intake Friction and Shear Interventions: Apply protective barrier, creams and emollients Problem: Falls - Risk of 
Goal: *Absence of Falls Document Della Love Fall Risk and appropriate interventions in the flowsheet. Outcome: Progressing Towards Goal 
Fall Risk Interventions: 
Mobility Interventions: Patient to call before getting OOB Mentation Interventions: Adequate sleep, hydration, pain control, Room close to nurse's station Elimination Interventions: Call light in reach, Patient to call for help with toileting needs History of Falls Interventions: Room close to nurse's station Problem: General Medical Care Plan Goal: *Vital signs within specified parameters Outcome: Not Met Heart rate at 109.

## 2018-10-22 LAB
ALBUMIN SERPL ELPH-MCNC: 1.6 G/DL (ref 2.9–4.4)
ALBUMIN/GLOB SERPL: 0.6 {RATIO} (ref 0.7–1.7)
ALPHA1 GLOB SERPL ELPH-MCNC: 0.2 G/DL (ref 0–0.4)
ALPHA2 GLOB SERPL ELPH-MCNC: 0.9 G/DL (ref 0.4–1)
ANION GAP SERPL CALC-SCNC: 6 MMOL/L (ref 5–15)
ATRIAL RATE: 110 BPM
B-GLOBULIN SERPL ELPH-MCNC: 0.7 G/DL (ref 0.7–1.3)
BUN SERPL-MCNC: 35 MG/DL (ref 6–20)
BUN/CREAT SERPL: 23 (ref 12–20)
CALCIUM SERPL-MCNC: 7.7 MG/DL (ref 8.5–10.1)
CALCULATED R AXIS, ECG10: -36 DEGREES
CALCULATED T AXIS, ECG11: -80 DEGREES
CHLORIDE SERPL-SCNC: 104 MMOL/L (ref 97–108)
CO2 SERPL-SCNC: 31 MMOL/L (ref 21–32)
CREAT SERPL-MCNC: 1.54 MG/DL (ref 0.55–1.02)
DIAGNOSIS, 93000: NORMAL
ERYTHROCYTE [DISTWIDTH] IN BLOOD BY AUTOMATED COUNT: 14.6 % (ref 11.5–14.5)
GAMMA GLOB SERPL ELPH-MCNC: 0.8 G/DL (ref 0.4–1.8)
GLOBULIN SER CALC-MCNC: 2.5 G/DL (ref 2.2–3.9)
GLUCOSE SERPL-MCNC: 95 MG/DL (ref 65–100)
HCT VFR BLD AUTO: 22.5 % (ref 35–47)
HGB BLD-MCNC: 6.9 G/DL (ref 11.5–16)
IGA SERPL-MCNC: 235 MG/DL (ref 64–422)
IGG SERPL-MCNC: 634 MG/DL (ref 700–1600)
IGM SERPL-MCNC: 180 MG/DL (ref 26–217)
KAPPA LC FREE SER-MCNC: 104 MG/L (ref 3.3–19.4)
KAPPA LC FREE/LAMBDA FREE SER: 0.09 {RATIO} (ref 0.26–1.65)
LAMBDA LC FREE SERPL-MCNC: 1129.5 MG/L (ref 5.7–26.3)
M PROTEIN SERPL ELPH-MCNC: ABNORMAL G/DL
MCH RBC QN AUTO: 31.9 PG (ref 26–34)
MCHC RBC AUTO-ENTMCNC: 30.7 G/DL (ref 30–36.5)
MCV RBC AUTO: 104.2 FL (ref 80–99)
NRBC # BLD: 0 K/UL (ref 0–0.01)
NRBC BLD-RTO: 0 PER 100 WBC
P-R INTERVAL, ECG05: 176 MS
PLATELET # BLD AUTO: 192 K/UL (ref 150–400)
PMV BLD AUTO: 9.7 FL (ref 8.9–12.9)
POTASSIUM SERPL-SCNC: 4.5 MMOL/L (ref 3.5–5.1)
PROT PATTERN SERPL IFE-IMP: ABNORMAL
PROT SERPL-MCNC: 4.1 G/DL (ref 6–8.5)
Q-T INTERVAL, ECG07: 344 MS
QRS DURATION, ECG06: 54 MS
QTC CALCULATION (BEZET), ECG08: 465 MS
RBC # BLD AUTO: 2.16 M/UL (ref 3.8–5.2)
SODIUM SERPL-SCNC: 141 MMOL/L (ref 136–145)
TROPONIN I SERPL-MCNC: 2.59 NG/ML
TROPONIN I SERPL-MCNC: 3.19 NG/ML
VENTRICULAR RATE, ECG03: 110 BPM
WBC # BLD AUTO: 7 K/UL (ref 3.6–11)

## 2018-10-22 PROCEDURE — 74011250637 HC RX REV CODE- 250/637: Performed by: INTERNAL MEDICINE

## 2018-10-22 PROCEDURE — 97530 THERAPEUTIC ACTIVITIES: CPT

## 2018-10-22 PROCEDURE — 86923 COMPATIBILITY TEST ELECTRIC: CPT | Performed by: HOSPITALIST

## 2018-10-22 PROCEDURE — 80048 BASIC METABOLIC PNL TOTAL CA: CPT | Performed by: HOSPITALIST

## 2018-10-22 PROCEDURE — 65660000000 HC RM CCU STEPDOWN

## 2018-10-22 PROCEDURE — 74011250636 HC RX REV CODE- 250/636: Performed by: HOSPITALIST

## 2018-10-22 PROCEDURE — 30233N1 TRANSFUSION OF NONAUTOLOGOUS RED BLOOD CELLS INTO PERIPHERAL VEIN, PERCUTANEOUS APPROACH: ICD-10-PCS | Performed by: HOSPITALIST

## 2018-10-22 PROCEDURE — 74011250637 HC RX REV CODE- 250/637: Performed by: HOSPITALIST

## 2018-10-22 PROCEDURE — 85027 COMPLETE CBC AUTOMATED: CPT | Performed by: HOSPITALIST

## 2018-10-22 PROCEDURE — 74011000250 HC RX REV CODE- 250: Performed by: HOSPITALIST

## 2018-10-22 PROCEDURE — 36430 TRANSFUSION BLD/BLD COMPNT: CPT

## 2018-10-22 PROCEDURE — 97110 THERAPEUTIC EXERCISES: CPT

## 2018-10-22 PROCEDURE — P9047 ALBUMIN (HUMAN), 25%, 50ML: HCPCS | Performed by: HOSPITALIST

## 2018-10-22 PROCEDURE — 99223 1ST HOSP IP/OBS HIGH 75: CPT | Performed by: INTERNAL MEDICINE

## 2018-10-22 PROCEDURE — 74011250637 HC RX REV CODE- 250/637: Performed by: NURSE PRACTITIONER

## 2018-10-22 PROCEDURE — 74011250636 HC RX REV CODE- 250/636: Performed by: INTERNAL MEDICINE

## 2018-10-22 PROCEDURE — 97535 SELF CARE MNGMENT TRAINING: CPT

## 2018-10-22 PROCEDURE — 93005 ELECTROCARDIOGRAM TRACING: CPT

## 2018-10-22 PROCEDURE — 84484 ASSAY OF TROPONIN QUANT: CPT | Performed by: HOSPITALIST

## 2018-10-22 PROCEDURE — 86900 BLOOD TYPING SEROLOGIC ABO: CPT | Performed by: HOSPITALIST

## 2018-10-22 PROCEDURE — P9016 RBC LEUKOCYTES REDUCED: HCPCS | Performed by: HOSPITALIST

## 2018-10-22 PROCEDURE — 36415 COLL VENOUS BLD VENIPUNCTURE: CPT | Performed by: HOSPITALIST

## 2018-10-22 RX ORDER — SENNOSIDES 8.6 MG/1
2 TABLET ORAL
Status: DISCONTINUED | OUTPATIENT
Start: 2018-10-22 | End: 2018-10-24 | Stop reason: HOSPADM

## 2018-10-22 RX ORDER — SODIUM CHLORIDE 9 MG/ML
250 INJECTION, SOLUTION INTRAVENOUS AS NEEDED
Status: DISCONTINUED | OUTPATIENT
Start: 2018-10-22 | End: 2018-10-24 | Stop reason: HOSPADM

## 2018-10-22 RX ORDER — CARVEDILOL 3.12 MG/1
3.12 TABLET ORAL 2 TIMES DAILY WITH MEALS
Status: DISCONTINUED | OUTPATIENT
Start: 2018-10-22 | End: 2018-10-24 | Stop reason: HOSPADM

## 2018-10-22 RX ORDER — HALOPERIDOL 5 MG/ML
0.5 INJECTION INTRAMUSCULAR ONCE
Status: COMPLETED | OUTPATIENT
Start: 2018-10-22 | End: 2018-10-22

## 2018-10-22 RX ORDER — ATORVASTATIN CALCIUM 40 MG/1
40 TABLET, FILM COATED ORAL
Status: DISCONTINUED | OUTPATIENT
Start: 2018-10-22 | End: 2018-10-24 | Stop reason: HOSPADM

## 2018-10-22 RX ADMIN — ATORVASTATIN CALCIUM 40 MG: 40 TABLET, FILM COATED ORAL at 21:59

## 2018-10-22 RX ADMIN — CARVEDILOL 3.12 MG: 3.12 TABLET, FILM COATED ORAL at 19:11

## 2018-10-22 RX ADMIN — BUMETANIDE 2 MG: 0.25 INJECTION INTRAMUSCULAR; INTRAVENOUS at 09:26

## 2018-10-22 RX ADMIN — HALOPERIDOL LACTATE 0.5 MG: 5 INJECTION INTRAMUSCULAR at 15:05

## 2018-10-22 RX ADMIN — LOSARTAN POTASSIUM 25 MG: 25 TABLET, FILM COATED ORAL at 09:27

## 2018-10-22 RX ADMIN — Medication 10 ML: at 15:05

## 2018-10-22 RX ADMIN — Medication 10 ML: at 21:59

## 2018-10-22 RX ADMIN — ALBUMIN (HUMAN) 12.5 G: 0.25 INJECTION, SOLUTION INTRAVENOUS at 10:20

## 2018-10-22 RX ADMIN — Medication 17.2 MG: at 21:59

## 2018-10-22 RX ADMIN — Medication 10 ML: at 06:03

## 2018-10-22 RX ADMIN — ACETAMINOPHEN 650 MG: 325 TABLET, FILM COATED ORAL at 21:59

## 2018-10-22 RX ADMIN — ACETAMINOPHEN 650 MG: 325 TABLET, FILM COATED ORAL at 09:29

## 2018-10-22 NOTE — PROGRESS NOTES
TRANSFER - IN REPORT: 
 
Verbal report received from Gt Cannon RN(name) on [facility] 27  being received from 6(unit) for routine progression of care Report consisted of patients Situation, Background, Assessment and  
Recommendations(SBAR). Information from the following report(s) SBAR, Kardex, MAR, Recent Results and Cardiac Rhythm NSR was reviewed with the receiving nurse. Opportunity for questions and clarification was provided. Assessment completed upon patients arrival to unit and care assumed. Bedside and Verbal shift change report given to Farhad Box RN (oncoming nurse) by Kapil Guerrero RN (offgoing nurse). Report included the following information SBAR, Kardex, MAR, Recent Results and Cardiac Rhythm NSR.

## 2018-10-22 NOTE — PROGRESS NOTES
Problem: Mobility Impaired (Adult and Pediatric) Goal: *Acute Goals and Plan of Care (Insert Text) Physical Therapy Goals Initiated 10/19/2018 1. Patient will move from supine to sit and sit to supine , scoot up and down and roll side to side in bed with supervision/set-up within 7 day(s). 2.  Patient will transfer from bed to chair and chair to bed with supervision/set-up using the least restrictive device within 7 day(s). 3.  Patient will perform sit to stand with supervision/set-up within 7 day(s). 4.  Patient will ambulate with supervision/set-up for 150 feet with the least restrictive device within 7 day(s). physical Therapy TREATMENT Patient: Raquel Ortiz (89 y.o. female) Date: 10/22/2018 Diagnosis: Bilateral leg edema Unsteady gait Dehydration Altered mental status Altered mental status Precautions: Fall Chart, physical therapy assessment, plan of care and goals were reviewed. ASSESSMENT: 
Patient received in bed with covers over face but awake and alert. Patient was seen as co-treat with occupational therapy for patient safety and maximal mobility for therapy. Patient with significant edema throughout body but more notably in LUE and bilateral LE's and hip/pelvis. This is limiting for patient and painful for mobility. Her mobility appears more decreased today in general, likely from lack of mobility over the weekend. Requiring increased assist for supine to sit and sit to stand. Standing trial improved from chair vs bed. Gait remains slow and unsteady. At this time, patient requiring significant assist for all mobility and will benefit from rehab prior to returning home. Progression toward goals: 
[]    Improving appropriately and progressing toward goals 
[]    Improving slowly and progressing toward goals 
[]    Not making progress toward goals and plan of care will be adjusted PLAN: 
Patient continues to benefit from skilled intervention to address the above impairments. Continue treatment per established plan of care. Discharge Recommendations:  Chava Levi Further Equipment Recommendations for Discharge:  none SUBJECTIVE:  
Patient stated Whatever you want to do.  \"the usual\" when asked about her pain, mobility. OBJECTIVE DATA SUMMARY:  
Critical Behavior: 
Neurologic State: Alert Orientation Level: Oriented to person, Oriented to place Cognition: Follows commands Safety/Judgement: Lack of insight into deficits Functional Mobility Training: 
Bed Mobility: 
  
Supine to Sit: Moderate assistance;Assist x2 Transfers: 
Sit to Stand: Maximum assistance;Assist x2; Moderate assistance Stand to Sit: Moderate assistance Bed to Chair: Assist x2; Moderate assistance Balance: 
Sitting: Intact Standing: Impaired; With support Standing - Static: Fair;Constant support Standing - Dynamic : FairAmbulation/Gait Training: 
Distance (ft): 30 Feet (ft) Assistive Device: Gait belt;Walker, rolling Ambulation - Level of Assistance: Minimal assistance Gait Abnormalities: Decreased step clearance Base of Support: Center of gravity altered; Widened Speed/Vanda: Pace decreased (<100 feet/min) Step Length: Right shortened;Left shortened Pain: 
Pain Scale 1: Numeric (0 - 10) Pain Intensity 1: 6 Pain Location 1: Abdomen Pain Orientation 1: Medial 
Pain Description 1: Aching Pain Intervention(s) 1: Medication (see MAR) After treatment:  
[x]    Patient left in no apparent distress sitting up in chair 
[]    Patient left in no apparent distress in bed 
[x]    Call bell left within reach [x]    Nursing notified 
[]    Caregiver present [x]    Bed alarm activated COMMUNICATION/COLLABORATION:  
The patients plan of care was discussed with: Occupational Therapist and Registered Nurse Asuncion Mckeon, PT Time Calculation: 25 mins

## 2018-10-22 NOTE — PROGRESS NOTES
2200 RRT called by nursing staff for CP Pt seen and evaluated. Patient complained of epigastric pain upon palpation Visit Vitals /55 (BP 1 Location: Right arm, BP Patient Position: At rest) Pulse (!) 110 Temp 98.8 °F (37.1 °C) Resp 20 Ht 5' 4.5\" (1.638 m) Wt 69 kg (152 lb 1.9 oz) SpO2 100% BMI 25.71 kg/m² PE: 
GEN-NAD 
PSYCH-A&Ox2; hx of demential   
HEENT- Oropharynx clear. NECK-supple, trachea midline LUNGS-CTA B 
HEART-Tachy ABD-soft, epigastric tenderness SKIN-warm/dry EKG-ST 
 
A/P: 
CP 
-Most likely acid reflux. GI cocktail 
-Tylenol for mild pain.

## 2018-10-22 NOTE — PROGRESS NOTES
Nephrology Progress Note Chidi Aguayo Date of Admission : 10/17/2018 CC: Follow up for anasarca/renal amyloid Assessment and Plan Anasarca: 
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 
- cont albumin/bumex for now  
- cont ARB 
- daily labs while here 
  
AL Amyloidosis: 
- found on her renal bx about a year ago - appreciate oncology input - awaiting for son to make a decision re: BMBx 
- with her hx of noncompliance, doubtful that she will comply with any treatment 
  
HTN: 
-BP stable 
  
Chronic Anemia: 
- hgb stable - per hematology Interval History: 
Seen and examined. BP borderline, good diuresis. No cp or sob reported. Current Medications: all current  Medications have been eviewed in [facility] Review of Systems: Pertinent items are noted in HPI. Objective: 
Vitals:   
Vitals:  
 10/21/18 2206 10/21/18 2209 10/21/18 2215 10/22/18 0146 BP: 110/62 100/59 104/55 102/59 Pulse: (!) 116 (!) 114 (!) 110 99 Resp: 20 20 20 20 Temp: 98.2 °F (36.8 °C) 98.8 °F (37.1 °C)  98.7 °F (37.1 °C) SpO2: 100% 100% 100% 100% Weight:      
Height:      
 
Intake and Output: 
No intake/output data recorded. 10/20 1901 - 10/22 0700 In: 240 [P.O.:240] Out: 350 [Urine:350] Physical Examination:General: NAD,Conversant Neck:  Supple, no mass Resp:  Lungs CTA B/L, no wheezing , normal respiratory effort CV:  RRR,  no murmur or rub, 1+ b/l LE edema GI:  Soft, NT, + Bowel sounds, no hepatosplenomegaly Neurologic:  Non focal 
Psych:             AAO x 3 appropriate affect Skin:  No Rash :  No alvarez []    High complexity decision making was performed 
[]    Patient is at high-risk of decompensation with multiple organ involvement Lab Data Personally Reviewed: I have reviewed all the pertinent labs, microbiology data and radiology studies during assessment. Recent Labs 10/21/18 
5757 10/20/18 
5085  140  
K 4.0 4.0  
 105 CO2 30 28 GLU 96 79 BUN 28* 26* CREA 1.18* 1.09* CA 7.9* 7.7* PHOS 2.8  --   
ALB 1.7*  --   
 
Recent Labs 10/21/18 
0500 WBC 6.7 HGB 7.7* HCT 25.0*  
 Lab Results Component Value Date/Time Specimen Description: URINE 06/12/2012 04:08 PM  
 
Lab Results Component Value Date/Time Culture result: NO SIGNIFICANT GROWTH 06/12/2012 04:08 PM  
 
Recent Results (from the past 24 hour(s)) EKG, 12 LEAD, INITIAL Collection Time: 10/21/18 10:18 PM  
Result Value Ref Range Ventricular Rate 110 BPM  
 Atrial Rate 110 BPM  
 P-R Interval 176 ms QRS Duration 54 ms Q-T Interval 344 ms QTC Calculation (Bezet) 465 ms Calculated R Axis -36 degrees Calculated T Axis -80 degrees Diagnosis Sinus tachycardia with fusion complexes Left axis deviation Inferior infarct (cited on or before 17-OCT-2018) Cannot rule out Anterior infarct , age undetermined When compared with ECG of 17-OCT-2018 17:49, 
fusion complexes are now present Minimal criteria for Anterior infarct are now present Questionable change in initial forces of Inferior leads ST now depressed in Inferior leads T wave inversion now evident in Inferior leads Judy Chavez MD 
[address] [place] [address], [address] [facility] Phone - [phone] Fax - [phone] 
www. [facility]

## 2018-10-22 NOTE — ACP (ADVANCE CARE PLANNING)
GOALS OF CARE:  Patient/Health Care Proxy Stated Goals: Prolong life    TREATMENT PREFERENCES:   Code Status: Full Code    Advance Care Planning:  Advance Care Planning 10/22/2018   Patient's Healthcare Decision Maker is: Legal Next of Kin   Confirm Advance Directive None   Patient Would Like to Complete Advance Directive Unable       Medical Interventions: Other (comment)(Full medication interventions at this juncture pending further discussion with son - aside from pursuing disease directed therapy for amyloid)         Franco Todd Cancer is legal NOK and healthcare decision maker. Patient does not have capacity for complex medical decisions at this time.

## 2018-10-22 NOTE — CONSULTS
Palliative Medicine Consult  [place]: [phone])    Patient Name: Kristina Krueger  YOB: 1930    Date of Initial Consult: 10/22/18  Reason for Consult: Care decisions  Requesting Provider: Hospitalist medicine  Primary Care Physician: Delgado Stroud MD     SUMMARY:   Kristina Krueger is a 80 y.o. with a past history of hypertension, hyperlipidemia and leg edema, who was admitted on 10/17/2018 from home with a diagnosis of encephalopathy and anasarca. She also has a known h/o nephrotic range proteinuria for which a renal bx was pursued 11/2017, c/w with AL amyloidosis. Unclear follow up after biopsy, she was re-hospitalized Juen 2018 at [facility] and has been following with nephrology since. Current medical issues leading to Palliative Medicine involvement include: life limiting illness in patient with concern for underlying dementia requiring care decisions. Social Hx: , has one adult son Daniel Cordon who teaches in [employer]. She lives independently, has an older sister whom she is close with and visits. Active member of her [employer]. PALLIATIVE DIAGNOSES:   1. Generalized pain and discomfort  2. Debility  3. Post prandial emesis  4. Constipation  5. Cognitive changes  6. Goals of care discussion       PLAN:   1. Met with pt on multiple occassions today as well as discussed her care with son Daniel Cordon. 2. No AMD - Daniel Cordon states yesterday she signed him as mPOA, he is NOK also so by default is decision maker. He has been her financial POA for some time now. Kevin Parr 211 notes a significant cognitive and physical decline in his Mother since hospitalization at June at [facility], followed by 20 day rehab stay. Prior to that he had not appreciated cognitive deficits. 4. He does not feel she is able to safely stay at home from here on out - she has not been able to handle her bills/finances or manage her self care. 5. Patient does not have capacity for complex medical decision making at this time. 6. We discussed current medical issues to date:  Namely the Amyloidosis which he has just learned of, knows she had a kidney issue back in June but struggled with the wealth of medical information he received and doesn't recall all discussed. 7. Discussed goal of obtaining bone marrow bx would be to reach a therapeutic treatment plan. Rafal Hopper does not feel pursuing weekly chemotherapy would be in his Mother's best interest.  He stated her age and debility as limiting factors to getting to an infusions center or clinic on a weekly basis. He expressed understanding that her disease is not curable and will progress. His main goal is that she receive more care and assistance. 8. Discussed code status. Introduced topic and concern that need to perform CPR/intubation would represent poor prognostic sign and chances for good recovery would be very low. He took it all in and appreciated information. Stated he knows his mother would not want to be on life prolonging therapies for more than a period of several weeks (he noted 3-4 w). He would like some time to further contemplate our discussion. 9. We talked about anticipated need for repeat hospitalization as complications arise from her disease and what some of her care goals may be. He states he never had discussions with his Mother prior to her illness about what types of care she would want (ie feeding tubes, hospital stays for acute or protracted illnesses, etc). 10. I will follow up with [facility] or Wed on our care planning conversations. She may benefit from a POLST if she is discharged to assisted living facility. 11. Care management to follow up with Rafal Hopper re: rehab vs assisted living facility discharge. 12. Constipation:  No BM documented in Is/Os. Bedside nurse not sure when last.  Diuretics likely contributing. Start senna 2 tabs qhs.   13. Post-prandial emesis:  May be related to constipation vs. Poor gastric emptying.   Start bowel regimen as above. Used haldol 0.5 mg IV x 1 for nausea at time of assessment. [facility]. Communicated plan of care with: Palliative IDT       GOALS OF CARE / TREATMENT PREFERENCES:     GOALS OF CARE:  Patient/Health Care Proxy Stated Goals: Prolong life      TREATMENT PREFERENCES:   Code Status: Full Code    Advance Care Planning:  Advance Care Planning 10/22/2018   Patient's Healthcare Decision Maker is: Legal Next of Kin   Confirm Advance Directive None   Patient Would Like to Complete Advance Directive Unable       Medical Interventions: Other (comment)(Full medication interventions at this juncture pending further discussion with son - aside from pursuing disease directed therapy for amyloid)     Other Instructions:   Franco Saucedo is decision maker         Other:    As far as possible, the palliative care team has discussed with patient / health care proxy about goals of care / treatment preferences for patient. HISTORY:     History obtained from:  Patient, EMR    CHIEF COMPLAINT:  swelling    HPI/SUBJECTIVE:    The patient is:   [x] Verbal and participatory  [] Non-participatory due to:     Mrs. Brenda Garcia presented to the hospital as per HPI. Sister noted worsening edema and confusion leading to hospitalization. Some improvement in mentation since admit per chart review. Today Ms. Brenda Garcia was initially doing ok post am assessment by PT/OT including bath. Ate lunch independently with set up. I followed up again after eating and she immediately vomited, small amount total, but repeated retching. This was new for her. Followed up again 1 1/2 hrs later, emesis ceased. She repeats \"It is as it is\" and repeatedly called out looking for potato chips. When redirected able to answer some personal questions about her life and family. She was unable to answer detailed questions about her health or why she was in the hospital other than \"swelling. \"      Clinical Pain Assessment (nonverbal scale for severity on nonverbal patients):   Clinical Pain Assessment  Severity: 3  Location: various locations  Character: unable to state  Duration: unable to state  Effect: mobility  Frequency: intermittent          Duration: for how long has pt been experiencing pain (e.g., 2 days, 1 month, years)  Frequency: how often pain is an issue (e.g., several times per day, once every few days, constant)     FUNCTIONAL ASSESSMENT:     Palliative Performance Scale (PPS):  PPS: 50       PSYCHOSOCIAL/SPIRITUAL SCREENING:     Palliative IDT has assessed this patient for cultural preferences / practices and a referral made as appropriate to needs (Cultural Services, Patient Advocacy, Ethics, etc.)    Advance Care Planning:  Advance Care Planning 10/22/2018   Patient's Healthcare Decision Maker is: Legal Next of Kin   Confirm Advance Directive None   Patient Would Like to Complete Advance Directive Unable       Any spiritual / Muslim concerns:  [] Yes /  [x] No    Caregiver Burnout:  [] Yes /  [x] No /  [] No Caregiver Present      Anticipatory grief assessment:   [x] Normal  / [] Maladaptive       ESAS Anxiety:      ESAS Depression:          REVIEW OF SYSTEMS:     Positive and pertinent negative findings in ROS are noted above in HPI. The following systems were [] reviewed / [x] unable to be reviewed as noted in HPI  Other findings are noted below. Systems: constitutional, ears/nose/mouth/throat, respiratory, gastrointestinal, genitourinary, musculoskeletal, integumentary, neurologic, psychiatric, endocrine. Positive findings noted below. Modified ESAS Completed by: provider           Pain: 3(intermittent in various locations)                                PHYSICAL EXAM:     From RN flowsheet:  Wt Readings from Last 3 Encounters:   10/21/18 69 kg (152 lb 1.9 oz)   11/29/17 65.3 kg (143 lb 15.4 oz)   06/12/12 56.2 kg (123 lb 14.4 oz)     Blood pressure 104/43, pulse 91, temperature 97.5 °F (36.4 °C), resp.  rate 18, height 5' 4.5\" (1.638 m), weight 69 kg (152 lb 1.9 oz), SpO2 100 %. Pain Scale 1: Numeric (0 - 10)  Pain Intensity 1: 6  Pain Onset 1: acute  Pain Location 1: Abdomen  Pain Orientation 1: Medial  Pain Description 1: Aching  Pain Intervention(s) 1: Medication (see MAR)  Last bowel movement, if known:     Constitutional: frail elderly female sitting up in chair post bath, NAD - upon reassessment post lunch she had emesis. Eyes: pupils equal, anicteric  ENMT: no nasal discharge  Cardiovascular: regular rhythm, distal pulses intact, +++ non-pitting landon edema in legs, trunk, lateral chest wall  Respiratory: breathing not labored, symmetric  Gastrointestinal: soft non-tender, +bowel sounds  Musculoskeletal: no deformity, ext intermittently tender to palpation  Skin: landon   Neurologic: following commands, moving all extremities, alert, oriented to self/hospital.  Able to answer historical questions about her past and family, answers questions about present status with repetition and lack of depth  Psychiatric: largely calm, intermittent agitation as evidenced by moaning/groaning (particularly with movement)       HISTORY:     Principal Problem:    Altered mental status (10/17/2018)    Active Problems:    Dehydration (10/17/2018)      Unsteady gait (10/17/2018)      Bilateral leg edema (10/17/2018)      Past Medical History:   Diagnosis Date    High cholesterol     Hypercholesterolemia     Hypertension     Other ill-defined conditions(799.89)     kidney stones      Past Surgical History:   Procedure Laterality Date    HX APPENDECTOMY  1960    REMOVAL GALLBLADDER  11/14/2010      Family History   Problem Relation Age of Onset    Heart Disease Father     Stroke Sister     Heart Disease Sister     Diabetes Sister       History reviewed, no pertinent family history.   Social History     Tobacco Use    Smoking status: Former Smoker    Smokeless tobacco: Never Used   Substance Use Topics    Alcohol use: Yes     Comment: occassionally     Allergies   Allergen Reactions    Seafood [Shellfish Containing Products] Swelling      Current Facility-Administered Medications   Medication Dose Route Frequency    bumetanide (BUMEX) injection 2 mg  2 mg IntraVENous Q12H    albumin human 25% (BUMINATE) solution 12.5 g  12.5 g IntraVENous Q12H    acetaminophen (TYLENOL) tablet 650 mg  650 mg Oral Q6H PRN    losartan (COZAAR) tablet 25 mg  25 mg Oral DAILY    enoxaparin (LOVENOX) injection 40 mg  40 mg SubCUTAneous Q24H    sodium chloride (NS) flush 5-10 mL  5-10 mL IntraVENous Q8H    sodium chloride (NS) flush 5-10 mL  5-10 mL IntraVENous PRN          LAB AND IMAGING FINDINGS:     Lab Results   Component Value Date/Time    WBC 6.7 10/21/2018 05:00 AM    HGB 7.7 (L) 10/21/2018 05:00 AM    PLATELET 489 10/03/9535 05:00 AM     Lab Results   Component Value Date/Time    Sodium 141 10/21/2018 04:38 AM    Potassium 4.0 10/21/2018 04:38 AM    Chloride 104 10/21/2018 04:38 AM    CO2 30 10/21/2018 04:38 AM    BUN 28 (H) 10/21/2018 04:38 AM    Creatinine 1.18 (H) 10/21/2018 04:38 AM    Calcium 7.9 (L) 10/21/2018 04:38 AM    Magnesium 2.3 10/18/2018 09:25 AM    Phosphorus 2.8 10/21/2018 04:38 AM      Lab Results   Component Value Date/Time    AST (SGOT) 30 10/18/2018 09:25 AM    Alk.  phosphatase 99 10/18/2018 09:25 AM    Protein, total 4.8 (L) 10/18/2018 09:25 AM    Albumin 1.7 (L) 10/21/2018 04:38 AM    Globulin 4.0 10/18/2018 09:25 AM     Lab Results   Component Value Date/Time    INR 1.0 10/18/2018 09:25 AM    Prothrombin time 10.4 10/18/2018 09:25 AM    aPTT 22.8 10/18/2018 09:25 AM      Lab Results   Component Value Date/Time    Iron 24 (L) 10/19/2018 05:06 AM    TIBC 50 (L) 10/19/2018 05:06 AM    Iron % saturation 48 10/19/2018 05:06 AM    Ferritin 678 (H) 10/19/2018 05:06 AM      No results found for: PH, PCO2, PO2  No components found for: [place]   Lab Results   Component Value Date/Time     (H) 10/17/2018 04:04 PM    CK - MB 4.5 (H) 10/17/2018 04:04 PM                Total time: 70m  Counseling / coordination time, spent as noted above: 35m  > 50% counseling / coordination?:  yes    Prolonged service was provided for  []30 min   []75 min in face to face time in the presence of the patient, spent as noted above. Time Start:   Time End:   Note: this can only be billed with 62068 (initial) or 05410 (follow up). If multiple start / stop times, list each separately.

## 2018-10-22 NOTE — PROGRESS NOTES
Problem: Self Care Deficits Care Plan (Adult) Goal: *Acute Goals and Plan of Care (Insert Text) Occupational Therapy Goals Initiated 10/19/2018 1. Patient will perform grooming with supervision/set-up standing at the sink within 7 day(s). 2.  Patient will perform bathing seated with minimal assistance/contact guard assist within 7 day(s) using AE PRN. 3.  Patient will perform lower body dressing with minimal assistance/contact guard assist within 7 day(s) using AE PRN. 4.  Patient will perform toilet transfers with supervision/set-up within 7 day(s). 5.  Patient will perform all aspects of toileting with supervision/set-up within 7 day(s). 6.  Patient will participate in upper extremity therapeutic exercise/activities with supervision/set-up for 5 minutes within 7 day(s). 7.  Patient will utilize energy conservation techniques during functional activities with verbal cues within 7 day(s). Occupational Therapy TREATMENT Patient: Ginny Nicolas (89 y.o. female) Date: 10/22/2018 Diagnosis: Bilateral leg edema Unsteady gait Dehydration Altered mental status Altered mental status Precautions: Fall Chart, occupational therapy assessment, plan of care, and goals were reviewed. ASSESSMENT: 
Patient received supine in bed with blankets pulled over her head, cleared for therapy by nursing. Patient continues to presents with flat affect, limited communication, abdominal pain, significant hard/non-pitting edema through BLEs, bottom, and lateral aspects of breasts, global weakness, fear of falling, lack of distal reach to BLEs and poor activity tolerance. Patient tolerating short ambulation around room, two sit<>stands, brief management in standing, and full bathing. Patient left eating lunch in chair with palliative MD present. Patient will require SNF rehab when medically stable. Progression toward goals: 
[]       Improving appropriately and progressing toward goals [x]       Improving slowly and progressing toward goals 
[]       Not making progress toward goals and plan of care will be adjusted PLAN: 
Patient continues to benefit from skilled intervention to address the above impairments. Continue treatment per established plan of care. Discharge Recommendations:  Chava Sims Further Equipment Recommendations for Discharge:  TBD SUBJECTIVE:  
Patient stated It's the usual. OBJECTIVE DATA SUMMARY:  
Cognitive/Behavioral Status: 
Neurologic State: Alert;Confused Orientation Level: Oriented to person;Oriented to place Cognition: Decreased attention/concentration;Decreased command following; Impaired decision making;Memory loss;Poor safety awareness Perception: Appears intact Perseveration: No perseveration noted Safety/Judgement: Fall prevention;Lack of insight into deficits Functional Mobility and Transfers for ADLs:Bed Mobility: 
Supine to Sit: Moderate assistance;Assist x2 Transfers: 
Sit to Stand: Maximum assistance;Assist x2; Moderate assistance Bed to Chair: Assist x2; Moderate assistance Balance: 
Sitting: Intact Standing: Impaired; With support Standing - Static: Fair;Constant support Standing - Dynamic : Fair ADL Intervention:Patient able to independently manage tray and eat food. Grooming Washing Face: Supervision/set-up Upper Body Bathing Bathing Assistance: Moderate assistance Position Performed: Seated in chair Lower Body Bathing Bathing Assistance: Total assistance(dependent) Perineal  : Total assistance (dependent) Position Performed: Seated in chair Cues: Physical assistance pants up;Physical assistance pants down Lower Body : Total assistance (dependent) Position Performed: Seated in chair Cues: Physical assistance Upper Body Dressing Assistance Hospital Gown: Maximum assistance Lower Body Dressing Assistance Protective Undergarmet: Total assistance (dependent) Socks: Total assistance (dependent) Leg Crossed Method Used: No 
Position Performed: Seated in chair Cognitive Retraining Safety/Judgement: Fall prevention;Lack of insight into deficits Pain: 
Pain Scale 1: Numeric (0 - 10) Pain Intensity 1: 6 Pain Location 1: Abdomen Pain Orientation 1: Medial 
Pain Description 1: Aching Pain Intervention(s) 1: Medication (see MAR) Activity Tolerance:  
Fair. Limited motivation. Please refer to the flowsheet for vital signs taken during this treatment. After treatment:  
[x] Patient left in no apparent distress sitting up in chair 
[] Patient left in no apparent distress in bed 
[x] Call bell left within reach [x] Nursing notified 
[x] Caregiver present [x] Chair alarm activated COMMUNICATION/COLLABORATION:  
The patients plan of care was discussed with: Physical Therapist and Registered Nurse Imtiaz Snell OT Time Calculation: 38 mins

## 2018-10-22 NOTE — PROGRESS NOTES
Attempted report twice. 1813: Patient's family Francis Gtz updated about transfer to [facility]. TRANSFER - OUT REPORT: 
 
Verbal report given to Randee RN(name) on Winnie Castro  being transferred to [facility]) for routine progression of care Report consisted of patients Situation, Background, Assessment and  
Recommendations(SBAR). Information from the following report(s) SBAR, MAR, Recent Results and Med Rec Status was reviewed with the receiving nurse. Lines:  
Peripheral IV 10/17/18 Right Hand (Active) Site Assessment Clean, dry, & intact 10/22/2018  9:24 AM  
Phlebitis Assessment 0 10/22/2018  9:24 AM  
Infiltration Assessment 0 10/22/2018  9:24 AM  
Dressing Status Clean, dry, & intact 10/22/2018  9:24 AM  
Dressing Type Transparent;Tape 10/22/2018  9:24 AM  
Hub Color/Line Status Capped 10/22/2018  9:24 AM  
Action Taken Open ports on tubing capped 10/22/2018  9:24 AM  
Alcohol Cap Used Yes 10/22/2018  9:24 AM  
   
Peripheral IV 10/21/18 Left;Posterior Hand (Active) Site Assessment Clean, dry, & intact 10/22/2018  9:24 AM  
Phlebitis Assessment 0 10/22/2018  9:24 AM  
Infiltration Assessment 0 10/22/2018  9:24 AM  
Dressing Status Clean, dry, & intact 10/22/2018  9:24 AM  
Dressing Type Transparent;Tape 10/22/2018  9:24 AM  
Hub Color/Line Status Blue 10/22/2018  9:24 AM  
Action Taken Open ports on tubing capped 10/22/2018  9:24 AM  
Alcohol Cap Used Yes 10/22/2018  9:24 AM  
  
 
Opportunity for questions and clarification was provided. Patient transported with: 
 Registered Nurse

## 2018-10-22 NOTE — PROGRESS NOTES
[facility] [facility] [address] [facility] [address], [address] W: [phone]  F: [phone] for Visit:  
Rose Marie Hughes is a 80 y.o. female who is seen in consultation at the request of Dr. Nick Schrader for evaluation of Amyloidosis History of Present Illness:  
Patient is a 80 y.o. female with PMH as below who was admitted on 10/17/18 when she presented to the emergency department with reports of lower extremity edema. She stated that this was chronic but family also noted some changes in her thinking ability and progressive decrease in mobility. She was thought to have anasarca and found to have a low serum albumin levels- 0.8. She was started on intravenous Bumex and IV albumin. 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 Both nephrology and hematology have been consulted for further evaluation and management. GI has been consulted as well as the patient was found to be anemic with a hemoglobin of 8.6 g/dL and had Hemoccult positive stools. She was apparently to follow-up at [facility] for amyloidosis which she was noncompliant with. Son was unaware of previous Amyloidosis diagnosis. Interval History: 
Patient alone today and pleasantly confused. She denies new aches or pains. She denies any chest pain, shortness of breath, fevers, chills, nausea, diarrhea, changes in weight or appetite. States she is eating as much as she can. No other complaints. Past Medical History:  
Diagnosis Date  High cholesterol  Hypercholesterolemia  Hypertension  Other ill-defined conditions(799.89)   
 kidney stones Past Surgical History:  
Procedure Laterality Date [address]  REMOVAL GALLBLADDER  11/14/2010 Social History Tobacco Use  Smoking status: Former Smoker  Smokeless tobacco: Never Used Substance Use Topics  Alcohol use: Yes Comment: occassionally Family History Problem Relation Age of Onset  Heart Disease Father  Stroke Sister  Heart Disease Sister  Diabetes Sister Current Facility-Administered Medications Medication Dose Route Frequency  senna (SENOKOT) tablet 17.2 mg  2 Tab Oral QHS  
 0.9% sodium chloride infusion 250 mL  250 mL IntraVENous PRN  
 bumetanide (BUMEX) injection 2 mg  2 mg IntraVENous Q12H  
 albumin human 25% (BUMINATE) solution 12.5 g  12.5 g IntraVENous Q12H  
 acetaminophen (TYLENOL) tablet 650 mg  650 mg Oral Q6H PRN  
 losartan (COZAAR) tablet 25 mg  25 mg Oral DAILY  enoxaparin (LOVENOX) injection 40 mg  40 mg SubCUTAneous Q24H  
 sodium chloride (NS) flush 5-10 mL  5-10 mL IntraVENous Q8H  
 sodium chloride (NS) flush 5-10 mL  5-10 mL IntraVENous PRN Allergies Allergen Reactions  Seafood [Shellfish Containing Products] Swelling Review of Systems: A complete review of systems was obtained, negative except as described above. Physical Exam:  
 
Visit Vitals /61 (BP 1 Location: Right arm, BP Patient Position: At rest) Pulse 96 Temp 98.5 °F (36.9 °C) Resp 18 Ht 5' 4.5\" (1.638 m) Wt 152 lb 1.9 oz (69 kg) SpO2 100% BMI 25.71 kg/m² ECOG PS: 3 General: Laying in bed, pleasantly confused Eyes: PERRLA, anicteric sclerae HENT: Atraumatic, OP clear Neck: Supple Lymphatic: No cervical, supraclavicular, or inguinal adenopathy Respiratory: CTAB, normal respiratory effort CV: Normal rate, regular rhythm, bilateral 3+ lower extremity edema GI: Soft, distended and nontender MS: Gait not assessed, digits without clubbing or cyanosis. Skin: No rashes, ecchymoses, or petechiae. Psych: Alert, confused with tangential conversation Results:  
 
Lab Results Component Value Date/Time  WBC 7.0 10/22/2018 03:11 PM  
 HGB 6.9 (L) 10/22/2018 03:11 PM  
 HCT 22.5 (L) 10/22/2018 03:11 PM  
 PLATELET 705 24/19/0876 03:11 PM  
 .2 (H) 10/22/2018 03:11 PM  
 ABS. NEUTROPHILS 3.7 10/21/2018 05:00 AM  
 
Lab Results Component Value Date/Time Sodium 141 10/22/2018 03:11 PM  
 Potassium 4.5 10/22/2018 03:11 PM  
 Chloride 104 10/22/2018 03:11 PM  
 CO2 31 10/22/2018 03:11 PM  
 Glucose 95 10/22/2018 03:11 PM  
 BUN 35 (H) 10/22/2018 03:11 PM  
 Creatinine 1.54 (H) 10/22/2018 03:11 PM  
 GFR est AA 39 (L) 10/22/2018 03:11 PM  
 GFR est non-AA 32 (L) 10/22/2018 03:11 PM  
 Calcium 7.7 (L) 10/22/2018 03:11 PM  
 Glucose (POC) 88 11/29/2017 04:35 PM  
 
Lab Results Component Value Date/Time Bilirubin, total 0.2 10/18/2018 09:25 AM  
 ALT (SGPT) 9 (L) 10/18/2018 09:25 AM  
 AST (SGOT) 30 10/18/2018 09:25 AM  
 Alk. phosphatase 99 10/18/2018 09:25 AM  
 Protein, total 4.1 (L) 10/20/2018 02:48 AM  
 Albumin 1.7 (L) 10/21/2018 04:38 AM  
 Globulin 4.0 10/18/2018 09:25 AM  
 
Lab Results Component Value Date/Time Iron % saturation 48 10/19/2018 05:06 AM  
 TIBC 50 (L) 10/19/2018 05:06 AM  
 Ferritin 678 (H) 10/19/2018 05:06 AM  
 Vitamin B12 576 10/17/2018 10:33 PM  
 Folate 17.3 10/17/2018 10:33 PM  
 TSH 4.89 (H) 10/17/2018 08:54 PM  
 M-Isauro Not Observed 10/20/2018 02:48 AM  
 NICHOL, Direct NEGATIVE  11/28/2017 12:03 PM  
 Lipase 290 10/17/2018 08:54 PM  
 Hep C  virus Ab Interp. NONREACTIVE 11/28/2017 12:03 PM  
 
Lab Results Component Value Date/Time INR 1.0 10/18/2018 09:25 AM  
 aPTT 22.8 10/18/2018 09:25 AM  
 
Component Latest Ref Rng & Units 10/20/2018  
 
      2:48 AM  
Free Kappa Lt Chains, serum 3.3 - 19.4 mg/L 104.0 (H) Free Lambda Lt Chains, serum 5.7 - 26.3 mg/L 1,129.5 (H) Kappa/Lambda ratio, serum 
    0.26 - 1.65   0.09 (L) Records reviewed and summarized above. Pathology report(s) reviewed Kidney biopsy 11/29/17 Kidney, biopsy:  
AL-amyloidosis, lambda light chain related with glomerular, interstitial and vascular involvement Mild vascular sclerosis associated with mild interstitial fibrosis See comment Comment Congo red and Thioflavin-T stains, as well as ultrastructural evaluation, confirm the diagnosis of amyloidosis Bone survey 10/19/18: IMPRESSION: No lytic or blastic abnormality. Degenerative changes as above. Osteopenia. Radiology report(s) reviewed above. Assessment:  
1) renal amyloidosis-AL with nephrotic range proteinuria This was diagnosed in 11/2017. However patient did not follow through with recommendations. She is now admitted with anasarca and hypoalbuminemia. Serum electrophoresis with no m-spike observed, immunoelectrophoresis with no evidence of gammopathy Urine electrophoresis pending She is 80 with a very poor performance status. However complete evaluation would include a bone marrow biopsy and assessment of organ function in general. 
 
ECHO shows EF of 65-70% and grade 1 diastolic dysfunction. Bone survey with no lytic lesions. Talked with Son this morning and afternoon. Discussed bone marrow biopsy and what information it would give for us. Son states patient would likely not pursue treatment and is requesting us not perform the bone marrow biopsy. 2) anasarca Due to severe hypo-albuminemia secondary to nephrotic range proteinuria. Management per nephrology and primary team 
 
3) anemia-Macrocytic Likely secondary to underlying plasma cell dyscrasia. Transfuse if less than 8 g/dL 4) altered mental status Unclear etiology. Likely metabolic. Plan: · No additional workup indicated at this time as patient's son says she will not pursue treatment. He does not want her to undergo BMBx. · UPEP pending · Consider hospice referral  
 
I appreciate the opportunity to participate in Ms. [address]. Seen in conjunction with Kay Cochran NP She has renal amyloidosis- AL 
ECHO with concentric Hypertrophy- Amyloid involvement not ruled out At the time of this addendum Light chains resulted with markedly elevated Lambda Light chains D/W Palliative, Internal med and son Amyloidosis is not curable Palliative treatments involve at least weekly treatments such as CyBorD. She has been non compliant previously and son agrees that she will not be amenable to any such treatment. Additional work up is not recommended in that case Oncology will sign off. Please call with any additional questions Signed By: Wilmer Aldana MD

## 2018-10-22 NOTE — PROGRESS NOTES
Bedside shift change report given to Patt (oncoming nurse) by Michel Gordon (offgoing nurse). Report included the following information SBAR, Kardex and MAR.

## 2018-10-22 NOTE — PROGRESS NOTES
Bedside shift change report given to [address] [place] [address] Rd (oncoming nurse) by [facility] RN (offgoing nurse). Report included the following information SBAR, MAR, Recent Results and Med Rec Status.

## 2018-10-22 NOTE — PROGRESS NOTES
Patient troponin 3.19 dr Nu Santos notifiied . Stat cardiology consult ordered and transfer back to telemetry. Dr Silvia Wilkins notified and dr Nu Santos phone number given. Primary nurse notified. `

## 2018-10-22 NOTE — PROGRESS NOTES
Problem: Falls - Risk of 
Goal: *Absence of Falls Document Zak Anderson Fall Risk and appropriate interventions in the flowsheet. Outcome: Progressing Towards Goal 
Fall Risk Interventions: 
Mobility Interventions: Communicate number of staff needed for ambulation/transfer Mentation Interventions: Adequate sleep, hydration, pain control Medication Interventions: Patient to call before getting OOB Elimination Interventions: Patient to call for help with toileting needs History of Falls Interventions: Door open when patient unattended

## 2018-10-22 NOTE — PROGRESS NOTES
Hospitalist Progress Note Staci Iglesias MD 
Answering service: [phone] OR [phone] from in house phone Date of Service:  10/22/2018 NAME:  Kristopher Palafox :  1930 MRN:  [id number] Admission Summary:  
59-year-old -American female with past medical history of amyloidosis,chronic anasarca,hypertension, hyperlipidemia, kidney stones, gait abnormality, presented to the emergency department from home via EMS with reports of leg swelling. The patient notes her legs have been swollen \"for years. \" Family also noted some confusion, change in her mental status, decreased mobility (per the initial ED report). She was admitted for further evaluation. Per chart review,she was admitted to the hospital in 2017 for anasarca,found to have nephrotic range proteinuria,underwent kidney biopsy which showed amyloidosis. Per discussion with , she was not compliant with OP appointments and was at [facility] few months ago. Spoke to patient's son her only child, he is not aware of the diagnosis amyloidosis. He could only remember that patient was in hospital last year for edema. He is concerned about patient's home situation and asked about placement at discharge. Interval history / Subjective: Jimbo Stout I saw the patient she had nausea/vomiting. When I asked if she any pain or other symptoms  - she said \"nothing than usual\". Assessment & Plan:  
 
Elevated troponin: 
-Demand ischemia VS NSTEMI 
-she has nausea/vomiting and reviewed overnight note that she complained of epigastric pain. Obtained troponin to rule out atypical symptoms for MI - troponin came back at 3.  
Consulted cardiology - Gia Alicea - he said she is not a candidate for cardiac cath at her age and elevated troponin could be as well from demand ischemia due to anemia - recommended medical management along with blood transfusion. As GI bleed is still a possibility with dropping Hb - we cannot anticoagulate or use aspirin. Gave low dose coreg and statin. Spoke to Son, Patty betancourt - updated about the possibility of myocardial infarction and that she is not a candidate for cardiac cath  - rediscussed code status including chest compressions,mech ventilator and other resuscitative measures  - he said he wants her to remain full code. He gave consent for blood transfusion after I reviewed risks and benefits. Acute anemia: 
-still continues to have dropping Hb 
- differentials include GI blood loss Vs underlying amyloidosis 
-Re consulted GI again - not a candidate for procedures with elevated troponin. 
-Hb 6.9 today. Will give 2 U PRBCs today Anasarca: 
-due to low albumin from nephrotic range proteinuria. -will hold diuretics for now in the setting of elevated troponin and soft BP. 
-This will need to be reassessed tomorrow. 
-monitor creatinine and electrolytes. -echo grade 1 diastolic dysfunction,concentric hypertrophy -? Related to amlydoidosis. Nephrotic syndrome: 
-protein/creatinine ratio  11.9 
-due to untreated amyloidosis. -renal biopsy last year: 
 AL-amyloidosis, lambda light chain related with glomerular, interstitial and vascular involvement. Mild vascular sclerosis associated with mild interstitial fibrosis. -nephrology following 
-Creatinine trending up. Amyloidosis - AL:  
- untreated. -Hematology following - reviewed notes,no more further testing as son agreed that she is not a candidate for chemotherapy. HTN: 
-will start coreg. 
-Hold losartan. Left sided pleural effusion: 
-no resp issues now. Code status: full DVT prophylaxis: SCDs. Care Plan discussed with: Patient/Family, nurse. Discussed with the son over phone- [phone]([facility] Stage) Disposition: TBD Hospital Problems  Date Reviewed: 10/17/2018 Codes Class Noted POA Dehydration ICD-10-CM: E86.0 ICD-9-CM: 276.51  10/17/2018 Unknown * (Principal) Altered mental status ICD-10-CM: R41.82 
ICD-9-CM: 780.97  10/17/2018 Unknown Unsteady gait ICD-10-CM: R26.81 
ICD-9-CM: 781.2  10/17/2018 Unknown Bilateral leg edema ICD-10-CM: R60.0 ICD-9-CM: 782.3  10/17/2018 Unknown Review of Systems:  
Pertinent items are noted in HPI. Vital Signs:  
 Last 24hrs VS reviewed since prior progress note. Most recent are: 
Visit Vitals /66 (BP 1 Location: Right leg) Pulse (!) 103 Temp 98.9 °F (37.2 °C) Resp 18 Ht 5' 4.5\" (1.638 m) Wt 69 kg (152 lb 1.9 oz) SpO2 95% BMI 25.71 kg/m² Intake/Output Summary (Last 24 hours) at 10/22/2018 1859 Last data filed at 10/22/2018 1300 Gross per 24 hour Intake 390 ml Output  Net 390 ml Physical Examination:  
 
 
     
Constitutional:  No acute distress, cooperative, pleasant   
ENT:  Oral mucous moist, oropharynx benign. Neck supple, Resp:  decreased breath sounds at bases. No wheezing/rhonchi/rales. No accessory muscle use CV:  Regular rhythm, normal rate, no murmurs, gallops, rubs GI:  Soft,  distended, non tender. normoactive bowel sounds,tense abdominal wall due to edema Musculoskeletal: Has tense 3+ edema of UE and LE. Has anasarca extending upto chest.  
 Neurologic:  Moves all extremities. AAO x 3,but confused/dementia. Data Review:  
 Review and/or order of clinical lab test 
Review and/or order of tests in the medicine section of CPT Labs:  
 
Recent Labs 10/22/18 (06) [phone] 10/21/18 
0500 WBC 7.0 6.7 HGB 6.9* 7.7* HCT 22.5* 25.0*  
 221 Recent Labs 10/22/18 73) [id number] 10/21/18 
6825 10/20/18 
8029  141 140  
K 4.5 4.0 4.0  
 104 105 CO2 31 30 28 BUN 35* 28* 26* CREA 1.54* 1.18* 1.09* GLU 95 96 79 CA 7.7* 7.9* 7.7* PHOS  --  2.8  --   
 
Recent Labs 10/21/18 
5778 10/20/18 
6509 TP  --  4.1* ALB 1.7*  --   
 
 No results for input(s): INR, PTP, APTT in the last 72 hours. No lab exists for component: INREXT, INREXT No results for input(s): FE, TIBC, PSAT, FERR in the last 72 hours. Lab Results Component Value Date/Time Folate 17.3 10/17/2018 10:33 PM  
  
No results for input(s): PH, PCO2, PO2 in the last 72 hours. Recent Labs 10/22/18 [address] [place] [address] TROIQ 3.19* No results found for: CHOL, CHOLX, CHLST, CHOLV, HDL, LDL, LDLC, DLDLP, TGLX, TRIGL, TRIGP, CHHD, CHHDX Lab Results Component Value Date/Time Glucose (POC) 88 11/29/2017 04:35 PM  
 Glucose (POC) 113 (H) 11/29/2017 01:20 PM  
 Glucose (POC) 59 (L) 11/29/2017 12:47 PM  
 Glucose (POC) 52 (L) 11/29/2017 12:18 PM  
 Glucose (POC) 104 (H) 11/29/2017 05:37 AM  
 
Lab Results Component Value Date/Time Color YELLOW/STRAW 10/17/2018 05:52 PM  
 Appearance TURBID (A) 10/17/2018 05:52 PM  
 Specific gravity 1.028 10/17/2018 05:52 PM  
 pH (UA) 5.0 10/17/2018 05:52 PM  
 Protein >300 (A) 10/17/2018 05:52 PM  
 Glucose NEGATIVE  10/17/2018 05:52 PM  
 Ketone NEGATIVE  10/17/2018 05:52 PM  
 Bilirubin NEGATIVE  10/17/2018 05:52 PM  
 Urobilinogen 1.0 10/17/2018 05:52 PM  
 Nitrites NEGATIVE  10/17/2018 05:52 PM  
 Leukocyte Esterase NEGATIVE  10/17/2018 05:52 PM  
 Epithelial cells FEW 10/17/2018 05:52 PM  
 Bacteria NEGATIVE  10/17/2018 05:52 PM  
 WBC 0-4 10/17/2018 05:52 PM  
 RBC 5-10 10/17/2018 05:52 PM  
 
 
 
Medications Reviewed:  
 
Current Facility-Administered Medications Medication Dose Route Frequency  senna (SENOKOT) tablet 17.2 mg  2 Tab Oral QHS  
 0.9% sodium chloride infusion 250 mL  250 mL IntraVENous PRN  
 carvedilol (COREG) tablet 3.125 mg  3.125 mg Oral BID WITH MEALS  
 atorvastatin (LIPITOR) tablet 40 mg  40 mg Oral QHS  
 0.9% sodium chloride infusion 250 mL  250 mL IntraVENous PRN  
 acetaminophen (TYLENOL) tablet 650 mg  650 mg Oral Q6H PRN  
 sodium chloride (NS) flush 5-10 mL  5-10 mL IntraVENous Q8H  
  sodium chloride (NS) flush 5-10 mL  5-10 mL IntraVENous PRN  
 
______________________________________________________________________ EXPECTED LENGTH OF STAY: 2d 14h ACTUAL LENGTH OF STAY:          5 Opal Lima MD

## 2018-10-22 NOTE — PROGRESS NOTES
Chart reviewed. CM met with patient and spoke with the son Nika Serrano) by phone. Darline Zamorano desires snf rehab at [facility] with patient transitioning to [facility] ([facility] Darline Zamorano states patient was at [facility] in July? He is unsure of days used for her previous rehab stay. The patient owns her home, however she has financial resources if needs for SNF (if she does not have her full medicare days at 100% coverage) and RIO. Referral sent via CC link. DAYAN Falcon Care Management Interventions PCP Verified by CM: Yes Last Visit to PCP: 10/03/18 Mode of Transport at Discharge: BLS Transition of Care Consult (CM Consult): SNF Partner SNF: Yes MyChart Signup: No 
Discharge Durable Medical Equipment: No 
Physical Therapy Consult: Yes Occupational Therapy Consult: Yes Speech Therapy Consult: No 
Current Support Network: Lives Alone Confirm Follow Up Transport: Other (see comment) Plan discussed with Pt/Family/Caregiver: Yes Freedom of Choice Offered: Yes Discharge Location Discharge Placement: Skilled nursing facility 3:45p  Follow-up call placed to [facility]. Facility has not received any referral via CC link. Facility requested that CM send referral through [employer]. Referral submitted per facility request.  Per Jonathan Falcon (Admissions Coord) the facility does have a snf female bed available.   DAYAN Falcon, CRM

## 2018-10-23 LAB
ABO + RH BLD: NORMAL
ALBUMIN 24H MFR UR ELPH: 81.5 %
ALBUMIN SERPL-MCNC: 1.4 G/DL (ref 3.5–5)
ALPHA1 GLOB 24H MFR UR ELPH: 3 %
ALPHA2 GLOB 24H MFR UR ELPH: 4.5 %
ANION GAP SERPL CALC-SCNC: 8 MMOL/L (ref 5–15)
ATRIAL RATE: 90 BPM
B-GLOBULIN MFR UR ELPH: 4.5 %
BASOPHILS # BLD: 0 K/UL (ref 0–0.1)
BASOPHILS NFR BLD: 1 % (ref 0–1)
BLD PROD TYP BPU: NORMAL
BLOOD GROUP ANTIBODIES SERPL: NORMAL
BPU ID: NORMAL
BUN SERPL-MCNC: 34 MG/DL (ref 6–20)
BUN/CREAT SERPL: 23 (ref 12–20)
CALCIUM SERPL-MCNC: 7.8 MG/DL (ref 8.5–10.1)
CALCULATED P AXIS, ECG09: 52 DEGREES
CALCULATED R AXIS, ECG10: -20 DEGREES
CALCULATED T AXIS, ECG11: 11 DEGREES
CHLORIDE SERPL-SCNC: 104 MMOL/L (ref 97–108)
CO2 SERPL-SCNC: 28 MMOL/L (ref 21–32)
COLLECT DURATION TIME UR: 24 HR
CREAT SERPL-MCNC: 1.51 MG/DL (ref 0.55–1.02)
CROSSMATCH RESULT,%XM: NORMAL
DIAGNOSIS, 93000: NORMAL
DIFFERENTIAL METHOD BLD: ABNORMAL
EOSINOPHIL # BLD: 0.2 K/UL (ref 0–0.4)
EOSINOPHIL NFR BLD: 3 % (ref 0–7)
ERYTHROCYTE [DISTWIDTH] IN BLOOD BY AUTOMATED COUNT: 15.2 % (ref 11.5–14.5)
GAMMA GLOB 24H MFR UR ELPH: 6.5 %
GLUCOSE SERPL-MCNC: 81 MG/DL (ref 65–100)
HCT VFR BLD AUTO: 26.8 % (ref 35–47)
HGB BLD-MCNC: 8.2 G/DL (ref 11.5–16)
IMM GRANULOCYTES # BLD: 0 K/UL (ref 0–0.04)
IMM GRANULOCYTES NFR BLD AUTO: 0 % (ref 0–0.5)
INTERPRETATION UR IFE-IMP: ABNORMAL
LYMPHOCYTES # BLD: 2.2 K/UL (ref 0.8–3.5)
LYMPHOCYTES NFR BLD: 34 % (ref 12–49)
M PROTEIN 24H MFR UR ELPH: ABNORMAL %
MCH RBC QN AUTO: 31.2 PG (ref 26–34)
MCHC RBC AUTO-ENTMCNC: 30.6 G/DL (ref 30–36.5)
MCV RBC AUTO: 101.9 FL (ref 80–99)
MONOCYTES # BLD: 0.6 K/UL (ref 0–1)
MONOCYTES NFR BLD: 10 % (ref 5–13)
NEUTS SEG # BLD: 3.5 K/UL (ref 1.8–8)
NEUTS SEG NFR BLD: 53 % (ref 32–75)
NOTE, 149533: ABNORMAL
NRBC # BLD: 0 K/UL (ref 0–0.01)
NRBC BLD-RTO: 0 PER 100 WBC
P-R INTERVAL, ECG05: 144 MS
PHOSPHATE SERPL-MCNC: 2.8 MG/DL (ref 2.6–4.7)
PLATELET # BLD AUTO: 194 K/UL (ref 150–400)
PMV BLD AUTO: 9.7 FL (ref 8.9–12.9)
POTASSIUM SERPL-SCNC: 4.7 MMOL/L (ref 3.5–5.1)
PROT 24H UR-MRATE: ABNORMAL MG/24 HR (ref 30–150)
PROT UR-MCNC: 464.3 MG/DL
Q-T INTERVAL, ECG07: 370 MS
QRS DURATION, ECG06: 66 MS
QTC CALCULATION (BEZET), ECG08: 452 MS
RBC # BLD AUTO: 2.63 M/UL (ref 3.8–5.2)
SODIUM SERPL-SCNC: 140 MMOL/L (ref 136–145)
SPECIMEN EXP DATE BLD: NORMAL
SPECIMEN VOL ?TM UR: 2950 ML
STATUS OF UNIT,%ST: NORMAL
UNIT DIVISION, %UDIV: 0
VENTRICULAR RATE, ECG03: 90 BPM
WBC # BLD AUTO: 6.5 K/UL (ref 3.6–11)

## 2018-10-23 PROCEDURE — 80069 RENAL FUNCTION PANEL: CPT | Performed by: HOSPITALIST

## 2018-10-23 PROCEDURE — 74011250637 HC RX REV CODE- 250/637: Performed by: HOSPITALIST

## 2018-10-23 PROCEDURE — 85025 COMPLETE CBC W/AUTO DIFF WBC: CPT | Performed by: HOSPITALIST

## 2018-10-23 PROCEDURE — 65660000000 HC RM CCU STEPDOWN

## 2018-10-23 PROCEDURE — 97530 THERAPEUTIC ACTIVITIES: CPT

## 2018-10-23 PROCEDURE — 74011250637 HC RX REV CODE- 250/637: Performed by: NURSE PRACTITIONER

## 2018-10-23 PROCEDURE — 94760 N-INVAS EAR/PLS OXIMETRY 1: CPT

## 2018-10-23 PROCEDURE — 74011250637 HC RX REV CODE- 250/637: Performed by: INTERNAL MEDICINE

## 2018-10-23 PROCEDURE — 36415 COLL VENOUS BLD VENIPUNCTURE: CPT | Performed by: HOSPITALIST

## 2018-10-23 RX ORDER — HYDROCODONE BITARTRATE AND ACETAMINOPHEN 5; 325 MG/1; MG/1
1 TABLET ORAL
Status: DISCONTINUED | OUTPATIENT
Start: 2018-10-23 | End: 2018-10-24 | Stop reason: HOSPADM

## 2018-10-23 RX ADMIN — Medication 10 ML: at 14:00

## 2018-10-23 RX ADMIN — HYDROCODONE BITARTRATE AND ACETAMINOPHEN 1 TABLET: 5; 325 TABLET ORAL at 00:27

## 2018-10-23 RX ADMIN — Medication 10 ML: at 05:36

## 2018-10-23 RX ADMIN — Medication 10 ML: at 21:20

## 2018-10-23 RX ADMIN — HYDROCODONE BITARTRATE AND ACETAMINOPHEN 1 TABLET: 5; 325 TABLET ORAL at 10:23

## 2018-10-23 RX ADMIN — HYDROCODONE BITARTRATE AND ACETAMINOPHEN 1 TABLET: 5; 325 TABLET ORAL at 21:19

## 2018-10-23 RX ADMIN — Medication 17.2 MG: at 21:19

## 2018-10-23 RX ADMIN — ATORVASTATIN CALCIUM 40 MG: 40 TABLET, FILM COATED ORAL at 21:19

## 2018-10-23 RX ADMIN — CARVEDILOL 3.12 MG: 3.12 TABLET, FILM COATED ORAL at 16:56

## 2018-10-23 RX ADMIN — CARVEDILOL 3.12 MG: 3.12 TABLET, FILM COATED ORAL at 09:18

## 2018-10-23 NOTE — PROGRESS NOTES
Nephrology Progress Note Vivian Santiago Date of Admission : 10/17/2018 CC: Follow up for anasarca/renal amyloid Assessment and Plan Anasarca: 
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 
- cont to hold diuretics and monitor 
  
AL Amyloidosis: 
- found on her renal bx about a year ago - appreciate hematology HTN: 
-BP stable 
  
Chronic Anemia: 
- hgb stable - per hematology Agree that palliative care is the best route. Pt very noncompliant and likely not to comply with any treatment for amyloidosis. Interval History: 
Seen and examined. BP borderline, Cr up, diuretics stopped. Resting in bed. Agitated, asking for breakfast. 
 
Current Medications: all current  Medications have been eviewed in [facility] Review of Systems: Pertinent items are noted in HPI. Objective: 
Vitals:   
Vitals:  
 10/23/18 0130 10/23/18 0202 10/23/18 0746 10/23/18 0508 BP: 129/66 118/62 130/67 117/66 Pulse: 91 90 85 90 Resp: 16 18 15 16 Temp: 97.6 °F (36.4 °C) 97.6 °F (36.4 °C) 97.9 °F (36.6 °C) 97.7 °F (36.5 °C) SpO2: 99% 98% 99% 99% Weight:   70.5 kg (155 lb 6.4 oz) Height:      
 
Intake and Output: 
No intake/output data recorded. 10/21 1901 - 10/23 0700 In: 700 [P.O.:390] Out: - Physical Examination:General: NAD,Conversant Neck:  Supple, no mass Resp:  Lungs CTA B/L, no wheezing , normal respiratory effort CV:  RRR,  no murmur or rub, 1+ b/l LE edema GI:  Soft, NT, + Bowel sounds, no hepatosplenomegaly Neurologic:  Non focal 
Psych:             AAO x 3 appropriate affect Skin:  No Rash :  No alvarez []    High complexity decision making was performed 
[]    Patient is at high-risk of decompensation with multiple organ involvement Lab Data Personally Reviewed: I have reviewed all the pertinent labs, microbiology data and radiology studies during assessment. Recent Labs 10/23/18 
0321 10/22/18 [phone] 10/21/18 
5134  141 141 K 4.7 4.5 4.0  
 104 104 CO2 28 31 30 GLU 81 95 96 BUN 34* 35* 28* CREA 1.51* 1.54* 1.18* CA 7.8* 7.7* 7.9*  
PHOS 2.8  --  2.8 ALB 1.4*  --  1.7* Recent Labs 10/23/18 
0324 10/22/18 [phone] 10/21/18 
0500 WBC 6.5 7.0 6.7 HGB 8.2* 6.9* 7.7* HCT 26.8* 22.5* 25.0*  
 192 221 Lab Results Component Value Date/Time Specimen Description: URINE 06/12/2012 04:08 PM  
 
Lab Results Component Value Date/Time Culture result: NO SIGNIFICANT GROWTH 06/12/2012 04:08 PM  
 
Recent Results (from the past 24 hour(s)) EKG, 12 LEAD, INITIAL Collection Time: 10/22/18  1:22 PM  
Result Value Ref Range Ventricular Rate 90 BPM  
 Atrial Rate 90 BPM  
 P-R Interval 144 ms QRS Duration 66 ms  
 Q-T Interval 370 ms QTC Calculation (Bezet) 452 ms Calculated P Axis 52 degrees Calculated R Axis -20 degrees Calculated T Axis 11 degrees Diagnosis Normal sinus rhythm Low voltage QRS Borderline ECG When compared with ECG of 21-OCT-2018 22:18, 
Minimal criteria for Anterior infarct are no longer present Criteria for Inferior infarct are no longer present ST no longer depressed in Inferior leads T wave inversion no longer evident in Inferior leads TROPONIN I Collection Time: 10/22/18  3:11 PM  
Result Value Ref Range Troponin-I, Qt. 3.19 (H) <0.05 ng/mL METABOLIC PANEL, BASIC Collection Time: 10/22/18  3:11 PM  
Result Value Ref Range Sodium 141 136 - 145 mmol/L Potassium 4.5 3.5 - 5.1 mmol/L Chloride 104 97 - 108 mmol/L  
 CO2 31 21 - 32 mmol/L Anion gap 6 5 - 15 mmol/L Glucose 95 65 - 100 mg/dL BUN 35 (H) 6 - 20 MG/DL Creatinine 1.54 (H) 0.55 - 1.02 MG/DL  
 BUN/Creatinine ratio 23 (H) 12 - 20 GFR est AA 39 (L) >60 ml/min/1.73m2 GFR est non-AA 32 (L) >60 ml/min/1.73m2 Calcium 7.7 (L) 8.5 - 10.1 MG/DL  
CBC W/O DIFF Collection Time: 10/22/18  3:11 PM  
Result Value Ref Range WBC 7.0 3.6 - 11.0 K/uL RBC 2.16 (L) 3.80 - 5.20 M/uL HGB 6.9 (L) 11.5 - 16.0 g/dL HCT 22.5 (L) 35.0 - 47.0 % .2 (H) 80.0 - 99.0 FL  
 MCH 31.9 26.0 - 34.0 PG  
 MCHC 30.7 30.0 - 36.5 g/dL  
 RDW 14.6 (H) 11.5 - 14.5 % PLATELET 540 524 - 673 K/uL MPV 9.7 8.9 - 12.9 FL  
 NRBC 0.0 0  WBC ABSOLUTE NRBC 0.00 0.00 - 0.01 K/uL  
TYPE + CROSSMATCH Collection Time: 10/22/18  7:02 PM  
Result Value Ref Range Crossmatch Expiration 10/25/2018 ABO/Rh(D) AB POSITIVE Antibody screen NEG Unit number [id number] Blood component type RC LR Unit division 00 Status of unit TRANSFUSED Crossmatch result Compatible TROPONIN I Collection Time: 10/22/18  7:02 PM  
Result Value Ref Range Troponin-I, Qt. 2.59 (H) <0.05 ng/mL RENAL FUNCTION PANEL Collection Time: 10/23/18  3:24 AM  
Result Value Ref Range Sodium 140 136 - 145 mmol/L Potassium 4.7 3.5 - 5.1 mmol/L Chloride 104 97 - 108 mmol/L  
 CO2 28 21 - 32 mmol/L Anion gap 8 5 - 15 mmol/L Glucose 81 65 - 100 mg/dL BUN 34 (H) 6 - 20 MG/DL Creatinine 1.51 (H) 0.55 - 1.02 MG/DL  
 BUN/Creatinine ratio 23 (H) 12 - 20 GFR est AA 40 (L) >60 ml/min/1.73m2 GFR est non-AA 33 (L) >60 ml/min/1.73m2 Calcium 7.8 (L) 8.5 - 10.1 MG/DL Phosphorus 2.8 2.6 - 4.7 MG/DL Albumin 1.4 (L) 3.5 - 5.0 g/dL CBC WITH AUTOMATED DIFF Collection Time: 10/23/18  3:24 AM  
Result Value Ref Range WBC 6.5 3.6 - 11.0 K/uL  
 RBC 2.63 (L) 3.80 - 5.20 M/uL HGB 8.2 (L) 11.5 - 16.0 g/dL HCT 26.8 (L) 35.0 - 47.0 % .9 (H) 80.0 - 99.0 FL  
 MCH 31.2 26.0 - 34.0 PG  
 MCHC 30.6 30.0 - 36.5 g/dL  
 RDW 15.2 (H) 11.5 - 14.5 % PLATELET 371 839 - 285 K/uL MPV 9.7 8.9 - 12.9 FL  
 NRBC 0.0 0  WBC ABSOLUTE NRBC 0.00 0.00 - 0.01 K/uL NEUTROPHILS 53 32 - 75 % LYMPHOCYTES 34 12 - 49 % MONOCYTES 10 5 - 13 % EOSINOPHILS 3 0 - 7 % BASOPHILS 1 0 - 1 % IMMATURE GRANULOCYTES 0 0.0 - 0.5 % ABS. NEUTROPHILS 3.5 1.8 - 8.0 K/UL  
 ABS. LYMPHOCYTES 2.2 0.8 - 3.5 K/UL  
 ABS. MONOCYTES 0.6 0.0 - 1.0 K/UL  
 ABS. EOSINOPHILS 0.2 0.0 - 0.4 K/UL  
 ABS. BASOPHILS 0.0 0.0 - 0.1 K/UL  
 ABS. IMM. GRANS. 0.0 0.00 - 0.04 K/UL  
 DF AUTOMATED French Blankenship MD 
[address] [place] [address] Phone - [phone] Fax - [phone] 
[email or web address]

## 2018-10-23 NOTE — PROGRESS NOTES
Hospitalist Progress Note Gilson Gore MD 
Answering service: [phone] OR [phone] from in house phone Date of Service:  10/23/2018 NAME:  Marzena Smith :  1930 MRN:  [id number] Admission Summary:  
51-year-old -American female with past medical history of amyloidosis,chronic anasarca,hypertension, hyperlipidemia, kidney stones, gait abnormality, presented to the emergency department from home via EMS with reports of leg swelling. The patient notes her legs have been swollen \"for years. \" Family also noted some confusion, change in her mental status, decreased mobility (per the initial ED report). She was admitted for further evaluation. Per chart review,she was admitted to the hospital in 2017 for anasarca,found to have nephrotic range proteinuria,underwent kidney biopsy which showed amyloidosis. Per discussion with , she was not compliant with OP appointments and was at [facility] few months ago. Spoke to patient's son her only child, he is not aware of the diagnosis amyloidosis. He could only remember that patient was in hospital last year for edema. He is concerned about patient's home situation and asked about placement at discharge. Interval history / Subjective: F/u Anemia No new issues No chest pain, nausea, vomiting, SOB Assessment & Plan:  
 
Elevated troponin: 
-Demand ischemia VS NSTEMI 
-she has nausea/vomiting and reviewed overnight note that she complained of epigastric pain. Obtained troponin to rule out atypical symptoms for MI - troponin came back at 3. Consulted cardiology - Derik Pelletier - he said she is not a candidate for cardiac cath at her age and elevated troponin could be as well from demand ischemia due to anemia - recommended medical management along with blood transfusion.  As GI bleed is still a possibility with dropping Hb - we cannot anticoagulate or use aspirin. Gave low dose coreg and statin. Spoke to Son, Jocelynn betancourt - updated about the possibility of myocardial infarction and that she is not a candidate for cardiac cath  - rediscussed code status including chest compressions,[facility] ventilator and other resuscitative measures  - he said he wants her to remain full code. Acute anemia: 
-still continues to have dropping Hb 
- differentials include GI blood loss Vs underlying amyloidosis 
-Re consulted GI again - not a candidate for procedures with elevated troponin. 
-Hb 6.9 today. s/p 2 U PRBCs 10/22 Anasarca: 
-due to low albumin from nephrotic range proteinuria. -will hold diuretics for now in the setting of elevated troponin and soft BP. 
-This will need to be reassessed tomorrow. 
-monitor creatinine and electrolytes. -echo grade 1 diastolic dysfunction,concentric hypertrophy -? Related to amlydoidosis. Nephrotic syndrome: 
-protein/creatinine ratio  11.9 
-due to untreated amyloidosis. -renal biopsy last year: 
 AL-amyloidosis, lambda light chain related with glomerular, interstitial and vascular involvement. Mild vascular sclerosis associated with mild interstitial fibrosis. -nephrology following 
-Creatinine trending up. Amyloidosis - AL:  
- untreated. -Hematology following - reviewed notes,no more further testing as son agreed that she is not a candidate for chemotherapy. HTN: 
-will start coreg. 
-Hold losartan. Left sided pleural effusion: 
-no resp issues now. Code status: full DVT prophylaxis: SCDs. Care Plan discussed with: Patient/Family, nurse. Discussed with the son over phone- [phone](Olegario Fung) Plan: Awaiting placement Disposition: TBD Hospital Problems  Date Reviewed: 10/17/2018 Codes Class Noted POA Dehydration ICD-10-CM: E86.0 ICD-9-CM: 276.51  10/17/2018 Unknown  * (Principal) Altered mental status ICD-10-CM: R41.82 
 ICD-9-CM: 780.97  10/17/2018 Unknown Unsteady gait ICD-10-CM: R26.81 
ICD-9-CM: 781.2  10/17/2018 Unknown Bilateral leg edema ICD-10-CM: R60.0 ICD-9-CM: 782.3  10/17/2018 Unknown Review of Systems:  
Pertinent items are noted in HPI. Vital Signs:  
 Last 24hrs VS reviewed since prior progress note. Most recent are: 
Visit Vitals /66 (BP 1 Location: Right arm, BP Patient Position: At rest) Pulse 90 Temp 97.7 °F (36.5 °C) Resp 16 Ht 5' 4.5\" (1.638 m) Wt 70.5 kg (155 lb 6.4 oz) SpO2 99% BMI 26.26 kg/m² Intake/Output Summary (Last 24 hours) at 10/23/2018 7034 Last data filed at 10/23/2018 0202 Gross per 24 hour Intake 700 ml Output  Net 700 ml Physical Examination:  
 
 
     
Constitutional:  No acute distress, cooperative, pleasant   
ENT:  Oral mucous moist, oropharynx benign. Neck supple, Resp:  decreased breath sounds at bases. No wheezing/rhonchi/rales. No accessory muscle use CV:  Regular rhythm, normal rate, no murmurs, gallops, rubs GI:  Soft,  distended, non tender. normoactive bowel sounds,tense abdominal wall due to edema Musculoskeletal: Has tense 3+ edema of UE and LE. Has anasarca extending upto chest.  
 Neurologic:  Moves all extremities. AAO x 3,but confused/dementia. Data Review:  
 Review and/or order of clinical lab test 
Review and/or order of tests in the medicine section of [facility] Labs:  
 
Recent Labs 10/23/18 
0324 10/22/18 [address] [place] [address] WBC 6.5 7.0 HGB 8.2* 6.9*  
HCT 26.8* 22.5*  
 192 Recent Labs 10/23/18 
0324 10/22/18 [address] [place] [address] 10/21/18 
0846  141 141  
K 4.7 4.5 4.0  
 104 104 CO2 28 31 30 BUN 34* 35* 28* CREA 1.51* 1.54* 1.18* GLU 81 95 96  
CA 7.8* 7.7* 7.9*  
PHOS 2.8  --  2.8 Recent Labs 10/23/18 
3275 10/21/18 
8000 ALB 1.4* 1.7* No results for input(s): INR, PTP, APTT in the last 72 hours. No lab exists for component: INREXT, INREXT No results for input(s): FE, TIBC, PSAT, FERR in the last 72 hours. Lab Results Component Value Date/Time Folate 17.3 10/17/2018 10:33 PM  
  
No results for input(s): PH, PCO2, PO2 in the last 72 hours. Recent Labs 10/22/18 
1902 10/22/18 7200 [address] TROIQ 2.59* 3.19* No results found for: CHOL, CHOLX, CHLST, CHOLV, HDL, LDL, LDLC, DLDLP, TGLX, TRIGL, TRIGP, CHHD, CHHDX Lab Results Component Value Date/Time Glucose (POC) 88 11/29/2017 04:35 PM  
 Glucose (POC) 113 (H) 11/29/2017 01:20 PM  
 Glucose (POC) 59 (L) 11/29/2017 12:47 PM  
 Glucose (POC) 52 (L) 11/29/2017 12:18 PM  
 Glucose (POC) 104 (H) 11/29/2017 05:37 AM  
 
Lab Results Component Value Date/Time Color YELLOW/STRAW 10/17/2018 05:52 PM  
 Appearance TURBID (A) 10/17/2018 05:52 PM  
 Specific gravity 1.028 10/17/2018 05:52 PM  
 pH (UA) 5.0 10/17/2018 05:52 PM  
 Protein >300 (A) 10/17/2018 05:52 PM  
 Glucose NEGATIVE  10/17/2018 05:52 PM  
 Ketone NEGATIVE  10/17/2018 05:52 PM  
 Bilirubin NEGATIVE  10/17/2018 05:52 PM  
 Urobilinogen 1.0 10/17/2018 05:52 PM  
 Nitrites NEGATIVE  10/17/2018 05:52 PM  
 Leukocyte Esterase NEGATIVE  10/17/2018 05:52 PM  
 Epithelial cells FEW 10/17/2018 05:52 PM  
 Bacteria NEGATIVE  10/17/2018 05:52 PM  
 WBC 0-4 10/17/2018 05:52 PM  
 RBC 5-10 10/17/2018 05:52 PM  
 
 
 
Medications Reviewed:  
 
Current Facility-Administered Medications Medication Dose Route Frequency  HYDROcodone-acetaminophen (NORCO) 5-325 mg per tablet 1 Tab  1 Tab Oral Q8H PRN  
 senna (SENOKOT) tablet 17.2 mg  2 Tab Oral QHS  
 0.9% sodium chloride infusion 250 mL  250 mL IntraVENous PRN  
 carvedilol (COREG) tablet 3.125 mg  3.125 mg Oral BID WITH MEALS  
 atorvastatin (LIPITOR) tablet 40 mg  40 mg Oral QHS  
 0.9% sodium chloride infusion 250 mL  250 mL IntraVENous PRN  
 acetaminophen (TYLENOL) tablet 650 mg  650 mg Oral Q6H PRN  
  sodium chloride (NS) flush 5-10 mL  5-10 mL IntraVENous Q8H  
 sodium chloride (NS) flush 5-10 mL  5-10 mL IntraVENous PRN  
 
______________________________________________________________________ EXPECTED LENGTH OF STAY: 2d 14h ACTUAL LENGTH OF STAY:          6 Ivette Vidal MD

## 2018-10-23 NOTE — CONSULTS
10/22/2018    Cardiology Consult  Note   [facility] M[facility]   --------PCP:-Luanne, MD Delgado   -----Subjective:   Castro Martinez is a 80 y.o. female    Has amyloid with severe edema, amyloid appears end stage  Advanced age  Vague belly symptoms last night, in am hospitalist sent off enzymes  Lab Results   Component Value Date/Time     (H) 10/17/2018 04:04 PM    CK - MB 4.5 (H) 10/17/2018 04:04 PM    CK-MB Index 1.8 10/17/2018 04:04 PM    Troponin-I, Qt. 2.59 (H) 10/22/2018 07:02 PM    low index but positive trop  Pt is very anemic  Not able to fields much information from pt, she is lucid and answers questions in short yes or no but not sure of any of her PMHx   Denies cardiac issues  Denies current chest pain, shortness of breath or syncope  Relates chronic lower extremity edema -albumin is very low  Echo 10-18-18 65-70% EF with LVH mild TR   Was apparently in hospital at [facility] earlier this year with amyloid heart disease  nephrotic range proteinuria 2017 then  renal biopsy-this on 11/29/17 with  AL amyloidosis      Discussion/Plan/Impression:    Elevated troponin, may be supply demand mismatch with acute anemia and amyloid heart, unclear if epicardial dz  Not a candidate for cath or stress testing, suggest continue palliative care path  Statin unlikely to change outcome  Avoid ASA or heparin due to risk for bleeding  Keep hgb >9 if not comfort care           Lab Results   Component Value Date/Time    Creatinine 1.54 (H) 10/22/2018 03:11 PM      Results for orders placed or performed during the hospital encounter of 10/17/18   EKG, 12 LEAD, INITIAL   Result Value Ref Range    Ventricular Rate 90 BPM    Atrial Rate 90 BPM    P-R Interval 144 ms    QRS Duration 66 ms    Q-T Interval 370 ms    QTC Calculation (Bezet) 452 ms    Calculated P Axis 52 degrees    Calculated R Axis -20 degrees    Calculated T Axis 11 degrees    Diagnosis       Normal sinus rhythm  Low voltage QRS  Borderline ECG  When compared with ECG of 21-OCT-2018 22:18,  Minimal criteria for Anterior infarct are no longer present  Criteria for Inferior infarct are no longer present  ST no longer depressed in Inferior leads  T wave inversion no longer evident in Inferior leads         Cardiac Studies/Hx:  No specialty comments available. Medical history notable for  has a past medical history of High cholesterol, Hypercholesterolemia, Hypertension, and Other ill-defined conditions(799.89). Social history notable for  reports that she has quit smoking. she has never used smokeless tobacco. She reports that she drinks alcohol. She reports that she does not use drugs. Family history notable for family history includes Diabetes in her sister; Heart Disease in her father and sister; Stroke in her sister. Past Medical History:   Diagnosis Date    High cholesterol     Hypercholesterolemia     Hypertension     Other ill-defined conditions(799.89)     kidney stones        ROS-pertinents  negative except as above  The pertinent portions of the medical history,physician and nursing notes, meds,vitals , labs and Ins/Outs,are reviewed in the electronic record  Pt reports   lower extremity edema   and the remainder of a complete multisystem 11 ROS  Could not be reviewed due to patient factors    Social History     Tobacco Use    Smoking status: Former Smoker    Smokeless tobacco: Never Used   Substance Use Topics    Alcohol use: Yes     Comment: occassionally    Drug use: No      Family History   Problem Relation Age of Onset    Heart Disease Father     Stroke Sister     Heart Disease Sister     Diabetes Sister       Prior to Admission Medications   Prescriptions Last Dose Informant Patient Reported? Taking? cholecalciferol (VITAMIN D3) 1,000 unit tablet   Yes No   Sig: Take 1,000 Units by mouth daily.    fluticasone (FLONASE) 50 mcg/actuation nasal spray   Yes No   Si Spray by Both Nostrils route daily. furosemide (LASIX) 80 mg tablet   Yes Yes   Sig: Take 80 mg by mouth two (2) times a day. metoprolol succinate (TOPROL XL) 50 mg XL tablet   Yes No   Sig: Take 150 mg by mouth daily.       Facility-Administered Medications: None     Allergies   Allergen Reactions    Seafood [Shellfish Containing Products] Swelling      Objective:    Physical Exam:   Patient Vitals for the past 12 hrs:   Temp Pulse Resp BP SpO2   10/22/18 1944 98.2 °F (36.8 °C) (!) 102 17 132/75 95 %   10/22/18 1845 98.9 °F (37.2 °C) (!) 103 18 117/66 95 %   10/22/18 1543 98.5 °F (36.9 °C) 96 18 116/61 100 %   10/22/18 0916 97.5 °F (36.4 °C) 91 18 104/43 100 %      General:  Somnolent cooperative, no distress, appears stated age   [de-identified], Neck:  NC,AT- no JVD   Chest Wall: inspection normal - no chest wall deformities or tenderness, respiratory effort normal   Lung:   clear to auscultation bilaterally   Heart:    normal rate, regular rhythm, normal S1, S2, no murmurs, rubs, clicks or gallops   Abdomen: Mildly with distended   Extremities: extremities normal, atraumatic, no cyanosis   2+ edema     Last 24hr Input/Output:    Intake/Output Summary (Last 24 hours) at 10/22/2018 2034  Last data filed at 10/22/2018 1300  Gross per 24 hour   Intake 390 ml   Output    Net 390 ml        Data Review:   Recent Results (from the past 24 hour(s))   EKG, 12 LEAD, INITIAL    Collection Time: 10/21/18 10:18 PM   Result Value Ref Range    Ventricular Rate 110 BPM    Atrial Rate 110 BPM    P-R Interval 176 ms    QRS Duration 54 ms    Q-T Interval 344 ms    QTC Calculation (Bezet) 465 ms    Calculated R Axis -36 degrees    Calculated T Axis -80 degrees    Diagnosis       ** Poor data quality, interpretation may be adversely affected  Sinus tachycardia  Left axis deviation  Inferior infarct (cited on or before 17-OCT-2018)  Cannot rule out Anterior infarct , age undetermined  When compared with ECG of 17-OCT-2018 17:49,  Minimal criteria for Anterior infarct are now present  Questionable change in initial forces of Inferior leads  ST now depressed in Inferior leads  T wave inversion now evident in Inferior leads  Confirmed by Lissette Pinto MD ([id number]) on 10/22/2018 12:01:04 PM     EKG, 12 LEAD, INITIAL    Collection Time: 10/22/18  1:22 PM   Result Value Ref Range    Ventricular Rate 90 BPM    Atrial Rate 90 BPM    P-R Interval 144 ms    QRS Duration 66 ms    Q-T Interval 370 ms    QTC Calculation (Bezet) 452 ms    Calculated P Axis 52 degrees    Calculated R Axis -20 degrees    Calculated T Axis 11 degrees    Diagnosis       Normal sinus rhythm  Low voltage QRS  Borderline ECG  When compared with ECG of 21-OCT-2018 22:18,  Minimal criteria for Anterior infarct are no longer present  Criteria for Inferior infarct are no longer present  ST no longer depressed in Inferior leads  T wave inversion no longer evident in Inferior leads     TROPONIN I    Collection Time: 10/22/18  3:11 PM   Result Value Ref Range    Troponin-I, Qt. 3.19 (H) <8.95 ng/mL   METABOLIC PANEL, BASIC    Collection Time: 10/22/18  3:11 PM   Result Value Ref Range    Sodium 141 136 - 145 mmol/L    Potassium 4.5 3.5 - 5.1 mmol/L    Chloride 104 97 - 108 mmol/L    CO2 31 21 - 32 mmol/L    Anion gap 6 5 - 15 mmol/L    Glucose 95 65 - 100 mg/dL    BUN 35 (H) 6 - 20 MG/DL    Creatinine 1.54 (H) 0.55 - 1.02 MG/DL    BUN/Creatinine ratio 23 (H) 12 - 20      GFR est AA 39 (L) >60 ml/min/1.73m2    GFR est non-AA 32 (L) >60 ml/min/1.73m2    Calcium 7.7 (L) 8.5 - 10.1 MG/DL   CBC W/O DIFF    Collection Time: 10/22/18  3:11 PM   Result Value Ref Range    WBC 7.0 3.6 - 11.0 K/uL    RBC 2.16 (L) 3.80 - 5.20 M/uL    HGB 6.9 (L) 11.5 - 16.0 g/dL    HCT 22.5 (L) 35.0 - 47.0 %    .2 (H) 80.0 - 99.0 FL    MCH 31.9 26.0 - 34.0 PG    MCHC 30.7 30.0 - 36.5 g/dL    RDW 14.6 (H) 11.5 - 14.5 %    PLATELET 770 281 - 465 K/uL    MPV 9.7 8.9 - 12.9 FL    NRBC 0.0 0  WBC    ABSOLUTE NRBC 0.00 0.00 - 0.01 K/uL   TROPONIN I    Collection Time: 10/22/18  7:02 PM   Result Value Ref Range    Troponin-I, Qt. 2.59 (H) <0.05 ng/mL      No results found for: CHOL, CHOLX, CHLST, CHOLV, [id number], HDL, LDL, LDLC, DLDLP, Jolene Smith, CHHD, CHHDX   Earl Smart MD 10/22/2018

## 2018-10-23 NOTE — PROGRESS NOTES
2015 Primary Nurse 111 Carter Mclain RN performed a dual skin assessment on this patient--unopened blisters BLE; hardened abdomen; harden breast bilaterally. Tristin score is as charted. Problem: Pressure Injury - Risk of 
Goal: *Prevention of pressure injury Document Tristin Scale and appropriate interventions in the flowsheet. Outcome: Progressing Towards Goal 
Pressure Injury Interventions: 
Sensory Interventions: Assess changes in LOC, Assess need for specialty bed, Check visual cues for pain, Minimize linen layers, Pressure redistribution bed/mattress (bed type) Moisture Interventions: Absorbent underpads, Check for incontinence Q2 hours and as needed, Minimize layers Activity Interventions: Increase time out of bed, Pressure redistribution bed/mattress(bed type), PT/OT evaluation Mobility Interventions: HOB 30 degrees or less, Pressure redistribution bed/mattress (bed type), PT/OT evaluation Nutrition Interventions: Document food/fluid/supplement intake Friction and Shear Interventions: HOB 30 degrees or less, Lift sheet, Lift team/patient mobility team 
 
  
 
 
 
 
Problem: Fluid Volume - Risk of, Imbalanced Goal: *Balanced intake and output Outcome: Progressing Towards Goal 
Patient receiving IV Bumex. External catheter placed to measure output. VS WDL. Patient Vitals for the past 8 hrs: 
 Temp Pulse Resp BP SpO2  
10/23/18 0030 97.8 °F (36.6 °C) 92 16 118/64 99 % 10/23/18 0001 97.8 °F (36.6 °C) 95 16 129/58 99 % 10/22/18 2330 97.6 °F (36.4 °C) 99 18 120/63 100 % 10/22/18 2315 98.3 °F (36.8 °C) 96 18 116/62 100 % 10/22/18 2257 98.1 °F (36.7 °C) 98 18 128/64 100 % 10/22/18 1944 98.2 °F (36.8 °C) (!) 102 17 132/75 95 % 10/22/18 1845 98.9 °F (37.2 °C) (!) 103 18 117/66 95 %

## 2018-10-23 NOTE — CDMP QUERY
Query #2 Dear Dr. Baljinder Jacob, Please clarify if this patient is (was) being treated/managed for:  
 
=> Metabolic encephalopathy in the setting of amyloidosis with confusion/AMS requiring lab monitoring and fall precautions 
=> Other explanation of clinical findings 
=> Clinically Undetermined (no explanation for clinical findings) The medical record reflects the following clinical findings, treatment, and risk factors. Risk Factors:  adm with AMS Clinical Indicators:  noted amyloidosis, dehydration and acute anemia, per PN-confused, poor insight per family Treatment: lab monitoring, IVF, neurovascular checks and fall precautions Please clarify and document your clinical opinion in the progress notes and discharge summary including the definitive and/or presumptive diagnosis, (suspected or probable), related to the above clinical findings. Please include clinical findings supporting your diagnosis. Thank You 
Adrien Katz,MSN,BSN,RN,[facility] 
[phone]

## 2018-10-23 NOTE — PROGRESS NOTES
Problem: Mobility Impaired (Adult and Pediatric) Goal: *Acute Goals and Plan of Care (Insert Text) Physical Therapy Goals Initiated 10/19/2018 1. Patient will move from supine to sit and sit to supine , scoot up and down and roll side to side in bed with supervision/set-up within 7 day(s). 2.  Patient will transfer from bed to chair and chair to bed with supervision/set-up using the least restrictive device within 7 day(s). 3.  Patient will perform sit to stand with supervision/set-up within 7 day(s). 4.  Patient will ambulate with supervision/set-up for 150 feet with the least restrictive device within 7 day(s). physical Therapy TREATMENT Patient: Chidi Aguayo (25 y.o. female) Date: 10/23/2018 Diagnosis: Bilateral leg edema Unsteady gait Dehydration Altered mental status Altered mental status Precautions: Fall Chart, physical therapy assessment, plan of care and goals were reviewed. ASSESSMENT: 
Pt received supine in bed and agreeable to therapy. Pt oriented x 3, but somewhat confused during conversation. Pt tolerated session fairly well. Pt completed supine to sit with mod A and verbal cues to reach across midline for bed railing to assist with transfer. Pt performed sit<>stand with RW with mod A x 2 from elevated bed height. Pt with posterior trunk lean during static standing and device too far forward to provide support. Pt ambulated a short distance with min A x 2 for bed to chair transfer. Pt demonstrated short, shuffle steps, bilateral knees in flexed position. Pt remained seated in chair and instructed to perform LE therex as tolerated. Pt will continue to benefit from PT to progress mobility as tolerated. Pt will benefit from SNF placement upon discharge. Progression toward goals: 
[]    Improving appropriately and progressing toward goals [x]    Improving slowly and progressing toward goals 
[]    Not making progress toward goals and plan of care will be adjusted PLAN: 
Patient continues to benefit from skilled intervention to address the above impairments. Continue treatment per established plan of care. Discharge Recommendations:  Chava Sims Further Equipment Recommendations for Discharge:  none SUBJECTIVE:  
Patient stated Let's get this over with.  OBJECTIVE DATA SUMMARY:  
Critical Behavior: 
Neurologic State: Alert Orientation Level: Oriented X4 Cognition: Decreased attention/concentration, Follows commands Safety/Judgement: Fall prevention, Lack of insight into deficits Functional Mobility Training: 
Bed Mobility: 
  
Supine to Sit: Moderate assistance;Assist x1 Scooting: Moderate assistance Transfers: 
Sit to Stand: Moderate assistance;Assist x2 Stand to Sit: Moderate assistance Bed to Chair: Minimum assistance;Assist x2 Balance: 
Sitting: Intact Standing: Impaired Standing - Static: Constant support; Fair 
Standing - Dynamic : FairAmbulation/Gait Training: 
Distance (ft): 5 Feet (ft) Assistive Device: Gait belt;Walker, rolling Ambulation - Level of Assistance: Minimal assistance;Assist x2 Gait Abnormalities: Decreased step clearance;Shuffling gait Base of Support: Widened;Center of gravity altered Speed/Vanda: Pace decreased (<100 feet/min) Step Length: Right shortened;Left shortened Therapeutic Exercises: Ankle pumps, LAQ x 5 Pain: 
Pain Scale 1: Numeric (0 - 10) Pain Intensity 1: 0 Pain Location 1: Generalized Pain Description 1: Aching Pain Intervention(s) 1: Medication (see MAR) Activity Tolerance:  
Fair. VSS Please refer to the flowsheet for vital signs taken during this treatment. After treatment:  
[x]    Patient left in no apparent distress sitting up in chair 
[]    Patient left in no apparent distress in bed 
[x]    Call bell left within reach [x]    Nursing notified 
[]    Caregiver present [x]    Bed alarm activated COMMUNICATION/COLLABORATION:  
The patients plan of care was discussed with: Registered Nurse Vamsi Morley, PT, DPT Time Calculation: 14 mins

## 2018-10-23 NOTE — PROGRESS NOTES
[facility] 
[address] [facility], [address] GI PROGRESS NOTE Dat Azevedo, [facility] [place] office [phone] NP in-hospital cell phone M-F until 4:30 After 5pm or on weekends, please call  for physician on call NAME: Sidney Le :  1930 MRN:  [id number] Subjective:  
Patient was evaluated earlier this admission for anemia, without evidence of GI bleed. She has been evaluated by hematology and nephrology for renal amyloidosis and anasarca. 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 She had symptoms  evening and labs showed anemia and elevated troponin (supply/deman mismatch) - no plans for heparin or ASA. No signs of GI blood loss. Objective: VITALS:  
Last 24hrs VS reviewed since prior progress note. Most recent are: 
Visit Vitals /65 Pulse 95 Temp 97.7 °F (36.5 °C) Resp 16 Ht 5' 4.5\" (1.638 m) Wt 70.5 kg (155 lb 6.4 oz) SpO2 99% BMI 26.26 kg/m² PHYSICAL EXAM: 
General: Cooperative, no acute distress   
Neurologic:  Alert and oriented X 2 HEENT: EOMI, no scleral icterus Lungs:  CTA bilaterally. No wheezing Heart:  S1 S2, regular rhythm, no murmur Abdomen: firm, distended, no tenderness. +Bowel sounds Extremities: +edema Psych:   Poor insight. Not anxious or agitated. Lab Data Reviewed:  
 
Recent Results (from the past 24 hour(s)) EKG, 12 LEAD, INITIAL Collection Time: 10/22/18  1:22 PM  
Result Value Ref Range Ventricular Rate 90 BPM  
 Atrial Rate 90 BPM  
 P-R Interval 144 ms QRS Duration 66 ms  
 Q-T Interval 370 ms QTC Calculation (Bezet) 452 ms Calculated P Axis 52 degrees Calculated R Axis -20 degrees Calculated T Axis 11 degrees Diagnosis Normal sinus rhythm Low voltage QRS Borderline ECG When compared with ECG of 21-OCT-2018 22:18, 
 Minimal criteria for Anterior infarct are no longer present Criteria for Inferior infarct are no longer present ST no longer depressed in Inferior leads T wave inversion no longer evident in Inferior leads TROPONIN I Collection Time: 10/22/18  3:11 PM  
Result Value Ref Range Troponin-I, Qt. 3.19 (H) <0.05 ng/mL METABOLIC PANEL, BASIC Collection Time: 10/22/18  3:11 PM  
Result Value Ref Range Sodium 141 136 - 145 mmol/L Potassium 4.5 3.5 - 5.1 mmol/L Chloride 104 97 - 108 mmol/L  
 CO2 31 21 - 32 mmol/L Anion gap 6 5 - 15 mmol/L Glucose 95 65 - 100 mg/dL BUN 35 (H) 6 - 20 MG/DL Creatinine 1.54 (H) 0.55 - 1.02 MG/DL  
 BUN/Creatinine ratio 23 (H) 12 - 20 GFR est AA 39 (L) >60 ml/min/1.73m2 GFR est non-AA 32 (L) >60 ml/min/1.73m2 Calcium 7.7 (L) 8.5 - 10.1 MG/DL  
CBC W/O DIFF Collection Time: 10/22/18  3:11 PM  
Result Value Ref Range WBC 7.0 3.6 - 11.0 K/uL  
 RBC 2.16 (L) 3.80 - 5.20 M/uL HGB 6.9 (L) 11.5 - 16.0 g/dL HCT 22.5 (L) 35.0 - 47.0 % .2 (H) 80.0 - 99.0 FL  
 MCH 31.9 26.0 - 34.0 PG  
 MCHC 30.7 30.0 - 36.5 g/dL  
 RDW 14.6 (H) 11.5 - 14.5 % PLATELET 860 592 - 507 K/uL MPV 9.7 8.9 - 12.9 FL  
 NRBC 0.0 0  WBC ABSOLUTE NRBC 0.00 0.00 - 0.01 K/uL  
TYPE + CROSSMATCH Collection Time: 10/22/18  7:02 PM  
Result Value Ref Range Crossmatch Expiration 10/25/2018 ABO/Rh(D) AB POSITIVE Antibody screen NEG Unit number [id number] Blood component type RC LR Unit division 00 Status of unit TRANSFUSED Crossmatch result Compatible TROPONIN I Collection Time: 10/22/18  7:02 PM  
Result Value Ref Range Troponin-I, Qt. 2.59 (H) <0.05 ng/mL RENAL FUNCTION PANEL Collection Time: 10/23/18  3:24 AM  
Result Value Ref Range Sodium 140 136 - 145 mmol/L Potassium 4.7 3.5 - 5.1 mmol/L Chloride 104 97 - 108 mmol/L  
 CO2 28 21 - 32 mmol/L  Anion gap 8 5 - 15 mmol/L  
 Glucose 81 65 - 100 mg/dL BUN 34 (H) 6 - 20 MG/DL Creatinine 1.51 (H) 0.55 - 1.02 MG/DL  
 BUN/Creatinine ratio 23 (H) 12 - 20 GFR est AA 40 (L) >60 ml/min/1.73m2 GFR est non-AA 33 (L) >60 ml/min/1.73m2 Calcium 7.8 (L) 8.5 - 10.1 MG/DL Phosphorus 2.8 2.6 - 4.7 MG/DL Albumin 1.4 (L) 3.5 - 5.0 g/dL CBC WITH AUTOMATED DIFF Collection Time: 10/23/18  3:24 AM  
Result Value Ref Range WBC 6.5 3.6 - 11.0 K/uL  
 RBC 2.63 (L) 3.80 - 5.20 M/uL HGB 8.2 (L) 11.5 - 16.0 g/dL HCT 26.8 (L) 35.0 - 47.0 % .9 (H) 80.0 - 99.0 FL  
 MCH 31.2 26.0 - 34.0 PG  
 MCHC 30.6 30.0 - 36.5 g/dL  
 RDW 15.2 (H) 11.5 - 14.5 % PLATELET 923 615 - 388 K/uL MPV 9.7 8.9 - 12.9 FL  
 NRBC 0.0 0  WBC ABSOLUTE NRBC 0.00 0.00 - 0.01 K/uL NEUTROPHILS 53 32 - 75 % LYMPHOCYTES 34 12 - 49 % MONOCYTES 10 5 - 13 % EOSINOPHILS 3 0 - 7 % BASOPHILS 1 0 - 1 % IMMATURE GRANULOCYTES 0 0.0 - 0.5 % ABS. NEUTROPHILS 3.5 1.8 - 8.0 K/UL  
 ABS. LYMPHOCYTES 2.2 0.8 - 3.5 K/UL  
 ABS. MONOCYTES 0.6 0.0 - 1.0 K/UL  
 ABS. EOSINOPHILS 0.2 0.0 - 0.4 K/UL  
 ABS. BASOPHILS 0.0 0.0 - 0.1 K/UL  
 ABS. IMM. GRANS. 0.0 0.00 - 0.04 K/UL  
 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  
Diagnosis Code  Cholelithiasis with cholecystitis K80.10  Dizziness R42  ARF (acute renal failure) (HCC) N17.9  Proteinuria R80.9  Dehydration E86.0  Altered mental status R41.82  
 Unsteady gait R26.81  
 Bilateral leg edema R60.0 Plan: · Monitor CBC, transfuse as necessary, goal hgb 9 per cardiology · Discussed with patient and son - prefer to continue conservative management, no plans for endoscopic evaluation · Appreciate heme/onc, cardiology, and nephrology Signed By: Dee Aldana NP   
 10/23/2018  9:08 AM 
  
 
 Patient seen and examined, agree with plans, no evidence of ongoing active bleeding. The requests only conservative management. They do not want endoscopic studies at this time. Please let us know if they change their mind.  We will see again on request. 
Melvin Etienne MD

## 2018-10-23 NOTE — PROGRESS NOTES
Problem: Pressure Injury - Risk of 
Goal: *Prevention of pressure injury Document Tristin Scale and appropriate interventions in the flowsheet. Outcome: Progressing Towards Goal 
Pressure Injury Interventions: 
Sensory Interventions: Assess changes in LOC, Assess need for specialty bed, Discuss PT/OT consult with provider, Float heels, Keep linens dry and wrinkle-free, Maintain/enhance activity level, Minimize linen layers Moisture Interventions: Limit adult briefs, Apply protective barrier, creams and emollients, Assess need for specialty bed, Minimize layers, Offer toileting Q_hr, Moisture barrier Activity Interventions: PT/OT evaluation, Increase time out of bed, Assess need for specialty bed Mobility Interventions: Float heels, Pressure redistribution bed/mattress (bed type), PT/OT evaluation, Assess need for specialty bed Nutrition Interventions: Document food/fluid/supplement intake Friction and Shear Interventions: Lift sheet, Lift team/patient mobility team, Minimize layers, Transfer aides:transfer board/Wilberto lift/ceiling lift Problem: Falls - Risk of 
Goal: *Absence of Falls Document Esdras Chrisitansen Fall Risk and appropriate interventions in the flowsheet. Outcome: Progressing Towards Goal 
Fall Risk Interventions: 
Mobility Interventions: OT consult for ADLs, Patient to call before getting OOB, PT Consult for assist device competence, PT Consult for mobility concerns, Strengthening exercises (ROM-active/passive), Communicate number of staff needed for ambulation/transfer Mentation Interventions: Bed/chair exit alarm, Eyeglasses and hearing aids, Family/sitter at bedside, Increase mobility, More frequent rounding, Reorient patient, Room close to nurse's station Medication Interventions: Bed/chair exit alarm, Evaluate medications/consider consulting pharmacy, Patient to call before getting OOB Elimination Interventions: Patient to call for help with toileting needs, Toilet paper/wipes in reach, Call light in reach, Bed/chair exit alarm History of Falls Interventions: Bed/chair exit alarm, Door open when patient unattended, Evaluate medications/consider consulting pharmacy, Investigate reason for fall Bedside and Verbal shift change report given to Tawny Ramirez RN (oncoming nurse) by Dilcia Alarcon RN (offgoing nurse). Report included the following information SBAR, Kardex, MAR, Recent Results and Cardiac Rhythm NSR.

## 2018-10-23 NOTE — PROGRESS NOTES
Cardiology Progress Note Admit Date: 10/17/2018 Admit Diagnosis: Bilateral leg edema Unsteady gait Dehydration Altered mental status Date: 10/23/2018     Time: 9:54 AM 
 
Subjective: 
No changes. Legs with edema. Denies CP. Assessment and Plan 1. Troponin elevation - Suspect supply/demand mismatch as related to amyloid heart, anemia, CKD 
 - Non candidate for cath or stress testing 
 - Continue current medical management 2. Anasarca 
 - as related to nephrotic syndrome - from renal amyloidosis - Nephrology following 3. HTN 
 - stable 4. Anemia - Chronic 
 - hematology following 5. GIB 
 - + heme stools - GI following Continues to slowly decline. Dx with poor prognosis. Palliative Care consulted for appropriate discussion of code status, ACP and possible hospice care. Needs no further cardiac testing. Assessment/Plan/Discussion:Cardiology Attending:  
 
Patient seen on the day of progress note and examined  and agree with Advance Practice Provider (DARIN, NP,PA)  assessment and plans. Vivian Santiago is a 80 y.o. female NSTEMI with supply demand mismatch due to amyloid Not much to be able to offer 
chronic ill with no likelihood of recovery from anasarca/nephrotic syndrome Continue to keep Hgb >9 Lab Results Component Value Date/Time HGB 8.2 (L) 10/23/2018 03:24 AM  
 is our recommendation to avoid further MI Unless has become palliative No aspirin due to bleed risk, blood dyscrasia Statin wont change outcome Will see back PRN Manoj Love MD   
 
 
  
ROS: 
Without CP or SOB. Does not answer other questions appropriately Objective: 
 
 Physical Exam: 
             
Visit Vitals /65 Pulse 95 Temp 97.7 °F (36.5 °C) Resp 16 Ht 5' 4.5\" (1.638 m) Wt 155 lb 6.4 oz (70.5 kg) SpO2 99% BMI 26.26 kg/m² General Appearance:   Well developed, well nourished,alert and oriented x 3, and 
 individual in no acute distress. Ears/Nose/Mouth/Throat:    Hearing grossly normal. 
  
    Neck:  Supple. Chest:    Lungs decreased, but clear to auscultation bilaterally. Cardiovascular:   Regular rate and rhythm, S1, S2 normal, no murmur. Abdomen:    Soft, non-tender, bowel sounds are active. Extremities:  1+ edema bilaterally. Skin:  Warm and dry. Telemetry: normal sinus rhythm Data Review:  
 Labs:   
Recent Results (from the past 24 hour(s)) EKG, 12 LEAD, INITIAL Collection Time: 10/22/18  1:22 PM  
Result Value Ref Range Ventricular Rate 90 BPM  
 Atrial Rate 90 BPM  
 P-R Interval 144 ms QRS Duration 66 ms  
 Q-T Interval 370 ms QTC Calculation (Bezet) 452 ms Calculated P Axis 52 degrees Calculated R Axis -20 degrees Calculated T Axis 11 degrees Diagnosis Normal sinus rhythm Low voltage QRS Borderline ECG When compared with ECG of 21-OCT-2018 22:18, 
Minimal criteria for Anterior infarct are no longer present Criteria for Inferior infarct are no longer present ST no longer depressed in Inferior leads T wave inversion no longer evident in Inferior leads Confirmed by Glo Gutierrez M.D., [facility] ([id number]) on 10/23/2018 9:50:22 AM 
  
TROPONIN I Collection Time: 10/22/18  3:11 PM  
Result Value Ref Range Troponin-I, Qt. 3.19 (H) <0.05 ng/mL METABOLIC PANEL, BASIC Collection Time: 10/22/18  3:11 PM  
Result Value Ref Range Sodium 141 136 - 145 mmol/L Potassium 4.5 3.5 - 5.1 mmol/L Chloride 104 97 - 108 mmol/L  
 CO2 31 21 - 32 mmol/L Anion gap 6 5 - 15 mmol/L Glucose 95 65 - 100 mg/dL BUN 35 (H) 6 - 20 MG/DL Creatinine 1.54 (H) 0.55 - 1.02 MG/DL  
 BUN/Creatinine ratio 23 (H) 12 - 20 GFR est AA 39 (L) >60 ml/min/1.73m2 GFR est non-AA 32 (L) >60 ml/min/1.73m2  Calcium 7.7 (L) 8.5 - 10.1 MG/DL  
CBC W/O DIFF  
 Collection Time: 10/22/18  3:11 PM  
Result Value Ref Range WBC 7.0 3.6 - 11.0 K/uL  
 RBC 2.16 (L) 3.80 - 5.20 M/uL HGB 6.9 (L) 11.5 - 16.0 g/dL HCT 22.5 (L) 35.0 - 47.0 % .2 (H) 80.0 - 99.0 FL  
 MCH 31.9 26.0 - 34.0 PG  
 MCHC 30.7 30.0 - 36.5 g/dL  
 RDW 14.6 (H) 11.5 - 14.5 % PLATELET 706 855 - 228 K/uL MPV 9.7 8.9 - 12.9 FL  
 NRBC 0.0 0  WBC ABSOLUTE NRBC 0.00 0.00 - 0.01 K/uL  
TYPE + CROSSMATCH Collection Time: 10/22/18  7:02 PM  
Result Value Ref Range Crossmatch Expiration 10/25/2018 ABO/Rh(D) AB POSITIVE Antibody screen NEG Unit number [id number] Blood component type  LR Unit division 00 Status of unit TRANSFUSED Crossmatch result Compatible TROPONIN I Collection Time: 10/22/18  7:02 PM  
Result Value Ref Range Troponin-I, Qt. 2.59 (H) <0.05 ng/mL RENAL FUNCTION PANEL Collection Time: 10/23/18  3:24 AM  
Result Value Ref Range Sodium 140 136 - 145 mmol/L Potassium 4.7 3.5 - 5.1 mmol/L Chloride 104 97 - 108 mmol/L  
 CO2 28 21 - 32 mmol/L Anion gap 8 5 - 15 mmol/L Glucose 81 65 - 100 mg/dL BUN 34 (H) 6 - 20 MG/DL Creatinine 1.51 (H) 0.55 - 1.02 MG/DL  
 BUN/Creatinine ratio 23 (H) 12 - 20 GFR est AA 40 (L) >60 ml/min/1.73m2 GFR est non-AA 33 (L) >60 ml/min/1.73m2 Calcium 7.8 (L) 8.5 - 10.1 MG/DL Phosphorus 2.8 2.6 - 4.7 MG/DL Albumin 1.4 (L) 3.5 - 5.0 g/dL CBC WITH AUTOMATED DIFF Collection Time: 10/23/18  3:24 AM  
Result Value Ref Range WBC 6.5 3.6 - 11.0 K/uL  
 RBC 2.63 (L) 3.80 - 5.20 M/uL HGB 8.2 (L) 11.5 - 16.0 g/dL HCT 26.8 (L) 35.0 - 47.0 % .9 (H) 80.0 - 99.0 FL  
 MCH 31.2 26.0 - 34.0 PG  
 MCHC 30.6 30.0 - 36.5 g/dL  
 RDW 15.2 (H) 11.5 - 14.5 % PLATELET 583 339 - 499 K/uL MPV 9.7 8.9 - 12.9 FL  
 NRBC 0.0 0  WBC ABSOLUTE NRBC 0.00 0.00 - 0.01 K/uL NEUTROPHILS 53 32 - 75 % LYMPHOCYTES 34 12 - 49 % MONOCYTES 10 5 - 13 % EOSINOPHILS 3 0 - 7 % BASOPHILS 1 0 - 1 % IMMATURE GRANULOCYTES 0 0.0 - 0.5 % ABS. NEUTROPHILS 3.5 1.8 - 8.0 K/UL  
 ABS. LYMPHOCYTES 2.2 0.8 - 3.5 K/UL  
 ABS. MONOCYTES 0.6 0.0 - 1.0 K/UL  
 ABS. EOSINOPHILS 0.2 0.0 - 0.4 K/UL  
 ABS. BASOPHILS 0.0 0.0 - 0.1 K/UL  
 ABS. IMM. GRANS. 0.0 0.00 - 0.04 K/UL  
 DF AUTOMATED Radiology:  
 
  
Current Facility-Administered Medications Medication Dose Route Frequency  HYDROcodone-acetaminophen (NORCO) 5-325 mg per tablet 1 Tab  1 Tab Oral Q8H PRN  
 senna (SENOKOT) tablet 17.2 mg  2 Tab Oral QHS  
 0.9% sodium chloride infusion 250 mL  250 mL IntraVENous PRN  
 carvedilol (COREG) tablet 3.125 mg  3.125 mg Oral BID WITH MEALS  
 atorvastatin (LIPITOR) tablet 40 mg  40 mg Oral QHS  
 0.9% sodium chloride infusion 250 mL  250 mL IntraVENous PRN  
 acetaminophen (TYLENOL) tablet 650 mg  650 mg Oral Q6H PRN  
 sodium chloride (NS) flush 5-10 mL  5-10 mL IntraVENous Q8H  
 sodium chloride (NS) flush 5-10 mL  5-10 mL IntraVENous PRN Marcellina Cj. VERONICA Metz M.D. [facility] [address] [place] [address], [address] Karolina Argueta 
 [phone]

## 2018-10-24 VITALS
DIASTOLIC BLOOD PRESSURE: 64 MMHG | RESPIRATION RATE: 16 BRPM | WEIGHT: 156.97 LBS | BODY MASS INDEX: 26.15 KG/M2 | OXYGEN SATURATION: 96 % | TEMPERATURE: 97.4 F | SYSTOLIC BLOOD PRESSURE: 109 MMHG | HEART RATE: 73 BPM | HEIGHT: 65 IN

## 2018-10-24 LAB
ALBUMIN SERPL-MCNC: 1.3 G/DL (ref 3.5–5)
ANION GAP SERPL CALC-SCNC: 7 MMOL/L (ref 5–15)
BASOPHILS # BLD: 0 K/UL (ref 0–0.1)
BASOPHILS NFR BLD: 1 % (ref 0–1)
BUN SERPL-MCNC: 37 MG/DL (ref 6–20)
BUN/CREAT SERPL: 29 (ref 12–20)
CALCIUM SERPL-MCNC: 7.8 MG/DL (ref 8.5–10.1)
CHLORIDE SERPL-SCNC: 104 MMOL/L (ref 97–108)
CO2 SERPL-SCNC: 29 MMOL/L (ref 21–32)
CREAT SERPL-MCNC: 1.29 MG/DL (ref 0.55–1.02)
DIFFERENTIAL METHOD BLD: ABNORMAL
EOSINOPHIL # BLD: 0.2 K/UL (ref 0–0.4)
EOSINOPHIL NFR BLD: 3 % (ref 0–7)
ERYTHROCYTE [DISTWIDTH] IN BLOOD BY AUTOMATED COUNT: 15.2 % (ref 11.5–14.5)
GLUCOSE SERPL-MCNC: 70 MG/DL (ref 65–100)
HCT VFR BLD AUTO: 29.3 % (ref 35–47)
HGB BLD-MCNC: 9.1 G/DL (ref 11.5–16)
IMM GRANULOCYTES # BLD: 0 K/UL (ref 0–0.04)
IMM GRANULOCYTES NFR BLD AUTO: 0 % (ref 0–0.5)
LYMPHOCYTES # BLD: 2.5 K/UL (ref 0.8–3.5)
LYMPHOCYTES NFR BLD: 29 % (ref 12–49)
MCH RBC QN AUTO: 31.5 PG (ref 26–34)
MCHC RBC AUTO-ENTMCNC: 31.1 G/DL (ref 30–36.5)
MCV RBC AUTO: 101.4 FL (ref 80–99)
MONOCYTES # BLD: 0.8 K/UL (ref 0–1)
MONOCYTES NFR BLD: 9 % (ref 5–13)
NEUTS SEG # BLD: 5 K/UL (ref 1.8–8)
NEUTS SEG NFR BLD: 58 % (ref 32–75)
NRBC # BLD: 0 K/UL (ref 0–0.01)
NRBC BLD-RTO: 0 PER 100 WBC
PHOSPHATE SERPL-MCNC: 2.9 MG/DL (ref 2.6–4.7)
PLATELET # BLD AUTO: 218 K/UL (ref 150–400)
PMV BLD AUTO: 9.7 FL (ref 8.9–12.9)
POTASSIUM SERPL-SCNC: 4.7 MMOL/L (ref 3.5–5.1)
RBC # BLD AUTO: 2.89 M/UL (ref 3.8–5.2)
SODIUM SERPL-SCNC: 140 MMOL/L (ref 136–145)
WBC # BLD AUTO: 8.6 K/UL (ref 3.6–11)

## 2018-10-24 PROCEDURE — 74011250637 HC RX REV CODE- 250/637: Performed by: HOSPITALIST

## 2018-10-24 PROCEDURE — 80069 RENAL FUNCTION PANEL: CPT | Performed by: HOSPITALIST

## 2018-10-24 PROCEDURE — 36415 COLL VENOUS BLD VENIPUNCTURE: CPT | Performed by: HOSPITALIST

## 2018-10-24 PROCEDURE — 74011250637 HC RX REV CODE- 250/637: Performed by: NURSE PRACTITIONER

## 2018-10-24 PROCEDURE — 85025 COMPLETE CBC W/AUTO DIFF WBC: CPT | Performed by: HOSPITALIST

## 2018-10-24 RX ORDER — CARVEDILOL 3.12 MG/1
3.12 TABLET ORAL 2 TIMES DAILY WITH MEALS
Qty: 60 TAB | Refills: 1 | Status: SHIPPED | OUTPATIENT
Start: 2018-10-24

## 2018-10-24 RX ORDER — BUMETANIDE 0.5 MG/1
1 TABLET ORAL 2 TIMES DAILY
Qty: 60 TAB | Refills: 1 | Status: SHIPPED | OUTPATIENT
Start: 2018-10-24

## 2018-10-24 RX ORDER — ATORVASTATIN CALCIUM 40 MG/1
40 TABLET, FILM COATED ORAL
Qty: 30 TAB | Refills: 1 | Status: SHIPPED | OUTPATIENT
Start: 2018-10-24

## 2018-10-24 RX ADMIN — HYDROCODONE BITARTRATE AND ACETAMINOPHEN 1 TABLET: 5; 325 TABLET ORAL at 06:25

## 2018-10-24 RX ADMIN — Medication 10 ML: at 06:25

## 2018-10-24 RX ADMIN — Medication 10 ML: at 13:21

## 2018-10-24 RX ADMIN — CARVEDILOL 3.12 MG: 3.12 TABLET, FILM COATED ORAL at 08:50

## 2018-10-24 NOTE — PROGRESS NOTES
Patient has been cleared for discharge and [facility] care has accepted for admission today. [facility] to transfer patient at 1500. Son is aware and in agreement to the above arrangements. Nursing to call report to [phone] Care Management Interventions PCP Verified by CM: Yes Last Visit to PCP: 10/03/18 Mode of Transport at Discharge: BLS Transition of Care Consult (CM Consult): SNF([facility]) Partner SNF: Yes MyChart Signup: No 
Discharge Durable Medical Equipment: No 
Physical Therapy Consult: Yes Occupational Therapy Consult: Yes Speech Therapy Consult: No 
Current Support Network: Lives Alone Confirm Follow Up Transport: Other (see comment) Plan discussed with Pt/Family/Caregiver: Yes Freedom of Choice Offered: Yes Discharge Location Discharge Placement: Skilled nursing facility [phone]

## 2018-10-24 NOTE — PROGRESS NOTES
[id number] Notified by monitor tech patient had 1.55 second pause. Did not inform cardiology because per cardiology's note no further cardiac testing required as patient has poor prognosis. 1910 Bedside and Verbal shift change report given to Dat Queen RN (oncoming nurse) by Diogo Kenyon RN (offgoing nurse). Report included the following information SBAR, Kardex, Intake/Output, MAR, Recent Results and Cardiac Rhythm NSR . Problem: Pressure Injury - Risk of 
Goal: *Prevention of pressure injury Document Tristin Scale and appropriate interventions in the flowsheet. Outcome: Progressing Towards Goal 
Pressure Injury Interventions: 
Sensory Interventions: Assess changes in LOC, Keep linens dry and wrinkle-free, Minimize linen layers, Pad between skin to skin, Turn and reposition approx. every two hours (pillows and wedges if needed), Pressure redistribution bed/mattress (bed type) Moisture Interventions: Absorbent underpads, Check for incontinence Q2 hours and as needed Activity Interventions: Increase time out of bed, Pressure redistribution bed/mattress(bed type), PT/OT evaluation Mobility Interventions: HOB 30 degrees or less, Pressure redistribution bed/mattress (bed type), PT/OT evaluation, Turn and reposition approx. every two hours(pillow and wedges) Nutrition Interventions: Document food/fluid/supplement intake Friction and Shear Interventions: HOB 30 degrees or less, Lift sheet Problem: General Medical Care Plan Goal: *Optimal pain control at patient's stated goal 
Outcome: Progressing Towards Goal 
Patient complains of generalized aching pains. Being treated with Norco q8h. Patient reassessed and treated as appropriate. Goal: *Progressive mobility and function (eg: ADL's) Outcome: Progressing Towards Goal 
Patient working with PT/OT. Up to chair for dinner.  Patient is maximum 2 assist.

## 2018-10-24 NOTE — DISCHARGE SUMMARY
Discharge Summary       PATIENT ID: Edith Chiang  MRN: [id number]   YOB: 1930    DATE OF ADMISSION: 10/17/2018  4:06 PM    DATE OF DISCHARGE: 10/24/2018   PRIMARY CARE PROVIDER: Jesus Francisco MD     ATTENDING PHYSICIAN: Dr Maricarmen Yost  DISCHARGING PROVIDER: Maricarmen Yost MD    To contact this individual call [phone] and ask the  to page. If unavailable ask to be transferred the Adult Hospitalist Department. CONSULTATIONS: ED CONSULT TO SENIOR SERVICES CASE MANAGEMENT  IP CONSULT TO GASTROENTEROLOGY  IP CONSULT TO PALLIATIVE CARE - PROVIDER  IP CONSULT TO PSYCHIATRY  IP CONSULT TO NEPHROLOGY  IP CONSULT TO HEMATOLOGY  IP CONSULT TO HOSPITALIST  IP CONSULT TO CARDIOLOGY    PROCEDURES/SURGERIES: * No surgery found *    ADMITTING [address] [place] [address] COURSE:   Elevated troponin:  -Demand ischemia VS NSTEMI  -she has nausea/vomiting and reviewed overnight note that she complained of epigastric pain. Obtained troponin to rule out atypical symptoms for MI - troponin came back at 3. Consulted cardiology - Mariya Trujillo - he said she is not a candidate for cardiac cath at her age and elevated troponin could be as well from demand ischemia due to anemia - recommended medical management along with blood transfusion. As GI bleed is still a possibility with dropping Hb - we cannot anticoagulate or use aspirin. Gave low dose coreg and statin. Spoke to Son, [employer] brown - updated about the possibility of myocardial infarction and that she is not a candidate for cardiac cath  - rediscussed code status including chest compressions,[facility] and other resuscitative measures  - he said he wants her to remain full code. Acute anemia:  -still continues to have dropping Hb  - differentials include GI blood loss Vs underlying amyloidosis  -Re consulted GI again - not a candidate for procedures with elevated troponin.  -Hb 6.9 today.   s/p 2 U PRBCs 10/22    Anasarca:  -due to low albumin from nephrotic range proteinuria. -will hold diuretics for now in the setting of elevated troponin and soft BP.  -This will need to be reassessed tomorrow.  -monitor creatinine and electrolytes. -echo grade 1 diastolic dysfunction,concentric hypertrophy -? Related to amlydoidosis. Nephrotic syndrome:  -protein/creatinine ratio  11.9  -due to untreated amyloidosis. -renal biopsy last year:   AL-amyloidosis, lambda light chain related with glomerular, interstitial and vascular involvement. Mild vascular sclerosis associated with mild interstitial fibrosis. -nephrology following  -Dr Demond Velazco recommended to discharge the patient on Bumex 1 mg BID. Amyloidosis - AL:   - untreated. -Hematology saw the patient - reviewed notes,no more further testing as son agreed that she is not a candidate for chemotherapy. HTN:  -will start coreg.  -Hold losartan. Left sided pleural effusion:  -no resp issues now. The patient has amyloidosis, NSTEMI, GI bleed. The son knows that the patient's condition will continue to deteriorate. As outpatient would be a good idea to continue the conversation about making the patient comfort care      DISCHARGE DIAGNOSES / PLAN:      1. Amyloidosis  2. GI bleed  3. NSTEMI vs demand ischemia       PENDING TEST RESULTS:   At the time of discharge the following test results are still pending: none    FOLLOW UP APPOINTMENTS:    Follow-up Information     Follow up With Specialties Details Why Contact Info    PCP  In 1 week             ADDITIONAL CARE RECOMMENDATIONS:   Follow up with PMD    DIET: Cardiac Diet    ACTIVITY: Activity as tolerated      DISCHARGE MEDICATIONS:   See Medication Reconciliation Form      NOTIFY YOUR PHYSICIAN FOR ANY OF THE FOLLOWING:   Fever over 101 degrees for 24 hours. Chest pain, shortness of breath, fever, chills, nausea, vomiting, diarrhea, change in mentation, falling, weakness, bleeding. Severe pain or pain not relieved by medications.   Or, any other signs or symptoms that you may have questions about.     DISPOSITION:   Home With:   OT  PT  HH  RN      x SNF/Inpatient Rehab/LTAC    Independent/assisted living    Hospice    Other:       PATIENT CONDITION AT DISCHARGE:     Functional status    Poor    x Deconditioned     Independent      Cognition     Lucid     Forgetful    x Dementia      Catheters/lines (plus indication)    [place]     PICC     PEG    x None      Code status    x Full code     DNR      PHYSICAL EXAMINATION AT DISCHARGE:  Please see progress note      CHRONIC MEDICAL DIAGNOSES:  Problem List as of 10/24/2018 Date Reviewed: 10/17/2018          Codes Class Noted - Resolved    Dehydration ICD-10-CM: E86.0  ICD-9-CM: 276.51  10/17/2018 - Present        * (Principal) Altered mental status ICD-10-CM: R41.82  ICD-9-CM: 780.97  10/17/2018 - Present        Unsteady gait ICD-10-CM: R26.81  ICD-9-CM: 781.2  10/17/2018 - Present        Bilateral leg edema ICD-10-CM: R60.0  ICD-9-CM: 782.3  10/17/2018 - Present        ARF (acute renal failure) ([facility] [address] ICD-10-CM: N17.9  ICD-9-CM: 584.9  11/27/2017 - Present        Proteinuria ICD-10-CM: R80.9  ICD-9-CM: 791.0  11/27/2017 - Present        Dizziness ICD-10-CM: R42  ICD-9-CM: 780.4  1/20/2012 - Present        Cholelithiasis with cholecystitis ICD-10-CM: K80.10  ICD-9-CM: 574.10  11/14/2010 - Present              Greater than 52 minutes were spent with the patient on counseling and coordination of care    Signed:   Tin Rasheed MD  10/24/2018  10:34 AM

## 2018-10-24 NOTE — DISCHARGE INSTRUCTIONS
Discharge SNF/Rehab Instructions/LTAC       PATIENT ID: Justin Burns  MRN: [id number]   YOB: 1930    DATE OF ADMISSION: 10/17/2018  4:06 PM    DATE OF DISCHARGE: 10/24/2018    PRIMARY CARE PROVIDER: Delgado Stroud MD       ATTENDING PHYSICIAN: Lorna Fisher MD  DISCHARGING PROVIDER: Tanja Almanza MD     To contact this individual call [phone] and ask the  to page. If unavailable ask to be transferred the Adult Hospitalist Department. CONSULTATIONS: ED CONSULT TO SENIOR SERVICES CASE MANAGEMENT  IP CONSULT TO GASTROENTEROLOGY  IP CONSULT TO PALLIATIVE CARE - PROVIDER  IP CONSULT TO PSYCHIATRY  IP CONSULT TO NEPHROLOGY  IP CONSULT TO HEMATOLOGY  IP CONSULT TO HOSPITALIST  IP CONSULT TO CARDIOLOGY    PROCEDURES/SURGERIES: * No surgery found *    ADMITTING [address] [place] [address] COURSE:   Elevated troponin:  -Demand ischemia VS NSTEMI  -she has nausea/vomiting and reviewed overnight note that she complained of epigastric pain. Obtained troponin to rule out atypical symptoms for MI - troponin came back at 3. Consulted cardiology - Debra Hernandez - he said she is not a candidate for cardiac cath at her age and elevated troponin could be as well from demand ischemia due to anemia - recommended medical management along with blood transfusion. As GI bleed is still a possibility with dropping Hb - we cannot anticoagulate or use aspirin. Gave low dose coreg and statin. Spoke to Son, Florecita betancourt - updated about the possibility of myocardial infarction and that she is not a candidate for cardiac cath  - rediscussed code status including chest compressions,[facility] and other resuscitative measures  - he said he wants her to remain full code. Acute anemia:  -still continues to have dropping Hb  - differentials include GI blood loss Vs underlying amyloidosis  -Re consulted GI again - not a candidate for procedures with elevated troponin.  -Hb 6.9 today.   s/p 2 U PRBCs 10/22    Anasarca:  -due to low albumin from nephrotic range proteinuria. -will hold diuretics for now in the setting of elevated troponin and soft BP.  -This will need to be reassessed tomorrow.  -monitor creatinine and electrolytes. -echo grade 1 diastolic dysfunction,concentric hypertrophy -? Related to amlydoidosis. Nephrotic syndrome:  -protein/creatinine ratio  11.9  -due to untreated amyloidosis. -renal biopsy last year:   AL-amyloidosis, lambda light chain related with glomerular, interstitial and vascular involvement. Mild vascular sclerosis associated with mild interstitial fibrosis. -nephrology following  -Creatinine trending up. Amyloidosis - AL:   - untreated. -Hematology saw the patient - reviewed notes,no more further testing as son agreed that she is not a candidate for chemotherapy. HTN:  -will start coreg.  -Hold losartan. Left sided pleural effusion:  -no resp issues now. The patient has amyloidosis, NSTEMI, GI bleed. The son knows that the patient's condition will continue to deteriorate. As outpatient would be a good idea to continue the conversation about making the patient comfort care      DISCHARGE DIAGNOSES / PLAN:      1. Amyloidosis  2. GI bleed  3. NSTEMI vs demand ischemia       PENDING TEST RESULTS:   At the time of discharge the following test results are still pending: none    FOLLOW UP APPOINTMENTS:    Follow-up Information     Follow up With Specialties Details Why Contact Info    PCP  In 1 week             ADDITIONAL CARE RECOMMENDATIONS:   Follow up with PMD    DIET: Cardiac Diet    ACTIVITY: Activity as tolerated      DISCHARGE MEDICATIONS:   See Medication Reconciliation Form      NOTIFY YOUR PHYSICIAN FOR ANY OF THE FOLLOWING:   Fever over 101 degrees for 24 hours. Chest pain, shortness of breath, fever, chills, nausea, vomiting, diarrhea, change in mentation, falling, weakness, bleeding. Severe pain or pain not relieved by medications.   Or, any other signs or symptoms that you may have questions about.     DISPOSITION:   Home With:   OT  PT  HH  RN      x SNF/Inpatient Rehab/LTAC    Independent/assisted living    Hospice    Other:       PATIENT CONDITION AT DISCHARGE:     Functional status    Poor    x Deconditioned     Independent      Cognition     Lucid     Forgetful    x Dementia      Catheters/lines (plus indication)    [place]     PICC     PEG    x None      Code status    x Full code     DNR      PHYSICAL EXAMINATION AT DISCHARGE:  Please see progress note      CHRONIC MEDICAL DIAGNOSES:  Problem List as of 10/24/2018 Date Reviewed: 10/17/2018          Codes Class Noted - Resolved    Dehydration ICD-10-CM: E86.0  ICD-9-CM: 276.51  10/17/2018 - Present        * (Principal) Altered mental status ICD-10-CM: R41.82  ICD-9-CM: 780.97  10/17/2018 - Present        Unsteady gait ICD-10-CM: R26.81  ICD-9-CM: 781.2  10/17/2018 - Present        Bilateral leg edema ICD-10-CM: R60.0  ICD-9-CM: 782.3  10/17/2018 - Present        ARF (acute renal failure) ([facility] [address] ICD-10-CM: N17.9  ICD-9-CM: 584.9  11/27/2017 - Present        Proteinuria ICD-10-CM: R80.9  ICD-9-CM: 791.0  11/27/2017 - Present        Dizziness ICD-10-CM: R42  ICD-9-CM: 780.4  1/20/2012 - Present        Cholelithiasis with cholecystitis ICD-10-CM: K80.10  ICD-9-CM: 574.10  11/14/2010 - Present                CDMP Checked:   Yes x     PROBLEM LIST Updated:  Yes x         Signed:   Amisha Kraus MD  10/24/2018  10:29 AM

## 2018-10-24 NOTE — PROGRESS NOTES
Hospitalist Progress Note Sharona Carpenter MD 
Answering service: [phone] from in house phone Date of Service:  10/24/2018 NAME:  Lev Harrell :  1930 MRN:  [id number] Admission Summary:  
80-year-old -American female with past medical history of amyloidosis,chronic anasarca,hypertension, hyperlipidemia, kidney stones, gait abnormality, presented to the emergency department from home via EMS with reports of leg swelling. The patient notes her legs have been swollen \"for years. \" Family also noted some confusion, change in her mental status, decreased mobility (per the initial ED report). She was admitted for further evaluation. Per chart review,she was admitted to the hospital in 2017 for anasarca,found to have nephrotic range proteinuria,underwent kidney biopsy which showed amyloidosis. Per discussion with , she was not compliant with OP appointments and was at [facility] few months ago. Spoke to patient's son her only child, he is not aware of the diagnosis amyloidosis. He could only remember that patient was in hospital last year for edema. He is concerned about patient's home situation and asked about placement at discharge. Interval history / Subjective: F/u Anemia No new issues No chest pain, nausea, vomiting, SOB Assessment & Plan:  
 
Elevated troponin: 
-Demand ischemia VS NSTEMI 
-she has nausea/vomiting and reviewed overnight note that she complained of epigastric pain. Obtained troponin to rule out atypical symptoms for MI - troponin came back at 3. Consulted cardiology - Mancil Glad - he said she is not a candidate for cardiac cath at her age and elevated troponin could be as well from demand ischemia due to anemia - recommended medical management along with blood transfusion.  As GI bleed is still a possibility with dropping Hb - we cannot anticoagulate or use aspirin. Gave low dose coreg and statin. Spoke to Son, Romero betancourt - updated about the possibility of myocardial infarction and that she is not a candidate for cardiac cath  - rediscussed code status including chest compressions,[facility] and other resuscitative measures  - he said he wants her to remain full code. Acute anemia: 
-still continues to have dropping Hb 
- differentials include GI blood loss Vs underlying amyloidosis 
-Re consulted GI again - not a candidate for procedures with elevated troponin. 
-Hb 6.9 today. s/p 2 U PRBCs 10/22 Anasarca: 
-due to low albumin from nephrotic range proteinuria. -will hold diuretics for now in the setting of elevated troponin and soft BP. 
-This will need to be reassessed tomorrow. 
-monitor creatinine and electrolytes. -echo grade 1 diastolic dysfunction,concentric hypertrophy -? Related to amlydoidosis. Nephrotic syndrome: 
-protein/creatinine ratio  11.9 
-due to untreated amyloidosis. -renal biopsy last year: 
 AL-amyloidosis, lambda light chain related with glomerular, interstitial and vascular involvement. Mild vascular sclerosis associated with mild interstitial fibrosis. -nephrology following 
-Creatinine trending up. Amyloidosis - AL:  
- untreated. -Hematology following - reviewed notes,no more further testing as son agreed that she is not a candidate for chemotherapy. HTN: 
-will start coreg. 
-Hold losartan. Left sided pleural effusion: 
-no resp issues now. AMS 
-likely acute encephalopathy on baseline dementia -now at baseline Code status: full DVT prophylaxis: SCDs. Care Plan discussed with: Patient/Family, nurse. Discussed with the son over phone- [phone](Olegario Gandhi) Plan: Ideally the patient should be Comfort care but  the son wants to keep the patient FULL CODE. [facility]. Very high risk of readmission because of medical issues Disposition: TBD Hospital Problems  Date Reviewed: 10/17/2018 Codes Class Noted POA Dehydration ICD-10-CM: E86.0 ICD-9-CM: 276.51  10/17/2018 Unknown * (Principal) Altered mental status ICD-10-CM: R41.82 
ICD-9-CM: 780.97  10/17/2018 Unknown Unsteady gait ICD-10-CM: R26.81 
ICD-9-CM: 781.2  10/17/2018 Unknown Bilateral leg edema ICD-10-CM: R60.0 ICD-9-CM: 782.3  10/17/2018 Unknown Review of Systems:  
Pertinent items are noted in HPI. Vital Signs:  
 Last 24hrs VS reviewed since prior progress note. Most recent are: 
Visit Vitals /66 Pulse 83 Temp 97.7 °F (36.5 °C) Resp 18 Ht 5' 4.5\" (1.638 m) Wt 71.2 kg (156 lb 15.5 oz) SpO2 98% BMI 26.53 kg/m² No intake or output data in the 24 hours ending 10/24/18 0955 Physical Examination:  
 
 
     
Constitutional:  No acute distress, cooperative, pleasant   
ENT:  Oral mucous moist, oropharynx benign. Neck supple, Resp:  decreased breath sounds at bases. No wheezing/rhonchi/rales. No accessory muscle use CV:  Regular rhythm, normal rate, no murmurs, gallops, rubs GI:  Soft,  distended, non tender. normoactive bowel sounds,tense abdominal wall due to edema Musculoskeletal: Has tense 3+ edema of UE and LE. Has anasarca extending upto chest.  
 Neurologic:  Moves all extremities. AAO x 3,but confused/dementia. Data Review:  
 Review and/or order of clinical lab test 
Review and/or order of tests in the medicine section of CPT Labs:  
 
Recent Labs 10/24/18 
3133 10/23/18 
6967 WBC 8.6 6.5 HGB 9.1* 8.2* HCT 29.3* 26.8*  
 194 Recent Labs 10/24/18 
2533 10/23/18 
0324 10/22/18 7200 [address]  140 141  
K 4.7 4.7 4.5  
 104 104 CO2 29 28 31 BUN 37* 34* 35* CREA 1.29* 1.51* 1.54* GLU 70 81 95 CA 7.8* 7.8* 7.7* PHOS 2.9 2.8  --   
 
Recent Labs 10/24/18 
4962 10/23/18 
6442 ALB 1.3* 1.4*  
 
 No results for input(s): INR, PTP, APTT in the last 72 hours. No lab exists for component: INREXT, INREXT No results for input(s): FE, TIBC, PSAT, FERR in the last 72 hours. Lab Results Component Value Date/Time Folate 17.3 10/17/2018 10:33 PM  
  
No results for input(s): PH, PCO2, PO2 in the last 72 hours. Recent Labs 10/22/18 
1902 10/22/18 7200 [address] TROIQ 2.59* 3.19* No results found for: CHOL, CHOLX, CHLST, CHOLV, HDL, LDL, LDLC, DLDLP, TGLX, TRIGL, TRIGP, CHHD, CHHDX Lab Results Component Value Date/Time Glucose (POC) 88 11/29/2017 04:35 PM  
 Glucose (POC) 113 (H) 11/29/2017 01:20 PM  
 Glucose (POC) 59 (L) 11/29/2017 12:47 PM  
 Glucose (POC) 52 (L) 11/29/2017 12:18 PM  
 Glucose (POC) 104 (H) 11/29/2017 05:37 AM  
 
Lab Results Component Value Date/Time Color YELLOW/STRAW 10/17/2018 05:52 PM  
 Appearance TURBID (A) 10/17/2018 05:52 PM  
 Specific gravity 1.028 10/17/2018 05:52 PM  
 pH (UA) 5.0 10/17/2018 05:52 PM  
 Protein >300 (A) 10/17/2018 05:52 PM  
 Glucose NEGATIVE  10/17/2018 05:52 PM  
 Ketone NEGATIVE  10/17/2018 05:52 PM  
 Bilirubin NEGATIVE  10/17/2018 05:52 PM  
 Urobilinogen 1.0 10/17/2018 05:52 PM  
 Nitrites NEGATIVE  10/17/2018 05:52 PM  
 Leukocyte Esterase NEGATIVE  10/17/2018 05:52 PM  
 Epithelial cells FEW 10/17/2018 05:52 PM  
 Bacteria NEGATIVE  10/17/2018 05:52 PM  
 WBC 0-4 10/17/2018 05:52 PM  
 RBC 5-10 10/17/2018 05:52 PM  
 
 
 
Medications Reviewed:  
 
Current Facility-Administered Medications Medication Dose Route Frequency  HYDROcodone-acetaminophen (NORCO) 5-325 mg per tablet 1 Tab  1 Tab Oral Q8H PRN  
 senna (SENOKOT) tablet 17.2 mg  2 Tab Oral QHS  
 0.9% sodium chloride infusion 250 mL  250 mL IntraVENous PRN  
 carvedilol (COREG) tablet 3.125 mg  3.125 mg Oral BID WITH MEALS  
 atorvastatin (LIPITOR) tablet 40 mg  40 mg Oral QHS  
 0.9% sodium chloride infusion 250 mL  250 mL IntraVENous PRN  
  acetaminophen (TYLENOL) tablet 650 mg  650 mg Oral Q6H PRN  
 sodium chloride (NS) flush 5-10 mL  5-10 mL IntraVENous Q8H  
 sodium chloride (NS) flush 5-10 mL  5-10 mL IntraVENous PRN  
 
______________________________________________________________________ EXPECTED LENGTH OF STAY: 3d 17h ACTUAL LENGTH OF STAY:          7 Alber Acevedo MD

## 2018-10-24 NOTE — PROGRESS NOTES
Nephrology Progress Note Edith Chiang Date of Admission : 10/17/2018 CC: Follow up for anasarca/renal amyloid Assessment and Plan Anasarca: 
- 2/2 nephrotic syndrome from her renal amyloidosis - stable - would d/c on bumex 1mg BID 
- will need labs next week - can fax to our office 
  
AL Amyloidosis: 
- found on her renal bx about a year ago - appreciate hematology Diastolic HF: 
- grade I with concentric hypertrophy - ? Amyloid involvement - per cardiology HTN: 
-BP stable 
  
Chronic Anemia: 
- hgb stable - per hematology Agree that comfort care is the best route. Pt very noncompliant and likely not to comply with any treatment for amyloidosis. Son would like the patient to remain full code at this time but not to pursue any further workup or treatment. Interval History: 
Seen and examined. Under her covers. No complaints. Edema stable. For d/c to SNF today. No reported cp, sob, n/v/d. Current Medications: all current  Medications have been eviewed in [facility] Review of Systems: Pertinent items are noted in HPI. Objective: 
Vitals:   
Vitals:  
 10/23/18 2313 10/24/18 6020 10/24/18 0730 10/24/18 9113 BP: 137/74 135/70 112/62 121/66 Pulse: 96 94 84 83 Resp: 18 18 18 Temp: 97.9 °F (36.6 °C) 97.5 °F (36.4 °C) 97.7 °F (36.5 °C) SpO2: 95% 96% 98% Weight:  71.2 kg (156 lb 15.5 oz) Height:      
 
Intake and Output: 
No intake/output data recorded. 10/22 1901 - 10/24 0700 In: 310 Out: - Physical Examination:General: NAD,Conversant Neck:  Supple, no mass Resp:  Lungs CTA B/L, no wheezing , normal respiratory effort CV:  RRR,  no murmur or rub, 1+ b/l LE edema GI:  Soft, NT, + Bowel sounds, no hepatosplenomegaly Neurologic:  Non focal 
Psych:             AAO x 3 appropriate affect Skin:  No Rash :  No alvarez []    High complexity decision making was performed 
[]    Patient is at high-risk of decompensation with multiple organ involvement Lab Data Personally Reviewed: I have reviewed all the pertinent labs, microbiology data and radiology studies during assessment. Recent Labs 10/24/18 
9574 10/23/18 
0324 10/22/18 (73) 113-875  140 141  
K 4.7 4.7 4.5  
 104 104 CO2 29 28 31 GLU 70 81 95 BUN 37* 34* 35* CREA 1.29* 1.51* 1.54* CA 7.8* 7.8* 7.7* PHOS 2.9 2.8  --   
ALB 1.3* 1.4*  --   
 
Recent Labs 10/24/18 
0366 [id number] 10/23/18 
0324 10/22/18 (73) 113-875 WBC 8.6 6.5 7.0 HGB 9.1* 8.2* 6.9*  
HCT 29.3* 26.8* 22.5*  
 194 192 Lab Results Component Value Date/Time Specimen Description: URINE 06/12/2012 04:08 PM  
 
Lab Results Component Value Date/Time Culture result: NO SIGNIFICANT GROWTH 06/12/2012 04:08 PM  
 
Recent Results (from the past 24 hour(s)) RENAL FUNCTION PANEL Collection Time: 10/24/18  3:32 AM  
Result Value Ref Range Sodium 140 136 - 145 mmol/L Potassium 4.7 3.5 - 5.1 mmol/L Chloride 104 97 - 108 mmol/L  
 CO2 29 21 - 32 mmol/L Anion gap 7 5 - 15 mmol/L Glucose 70 65 - 100 mg/dL BUN 37 (H) 6 - 20 MG/DL Creatinine 1.29 (H) 0.55 - 1.02 MG/DL  
 BUN/Creatinine ratio 29 (H) 12 - 20 GFR est AA 47 (L) >60 ml/min/1.73m2 GFR est non-AA 39 (L) >60 ml/min/1.73m2 Calcium 7.8 (L) 8.5 - 10.1 MG/DL Phosphorus 2.9 2.6 - 4.7 MG/DL Albumin 1.3 (L) 3.5 - 5.0 g/dL CBC WITH AUTOMATED DIFF Collection Time: 10/24/18  3:34 AM  
Result Value Ref Range WBC 8.6 3.6 - 11.0 K/uL  
 RBC 2.89 (L) 3.80 - 5.20 M/uL HGB 9.1 (L) 11.5 - 16.0 g/dL HCT 29.3 (L) 35.0 - 47.0 % .4 (H) 80.0 - 99.0 FL  
 MCH 31.5 26.0 - 34.0 PG  
 MCHC 31.1 30.0 - 36.5 g/dL  
 RDW 15.2 (H) 11.5 - 14.5 % PLATELET 536 779 - 823 K/uL MPV 9.7 8.9 - 12.9 FL  
 NRBC 0.0 0  WBC ABSOLUTE NRBC 0.00 0.00 - 0.01 K/uL NEUTROPHILS 58 32 - 75 % LYMPHOCYTES 29 12 - 49 % MONOCYTES 9 5 - 13 % EOSINOPHILS 3 0 - 7 % BASOPHILS 1 0 - 1 % IMMATURE GRANULOCYTES 0 0.0 - 0.5 % ABS. NEUTROPHILS 5.0 1.8 - 8.0 K/UL  
 ABS. LYMPHOCYTES 2.5 0.8 - 3.5 K/UL  
 ABS. MONOCYTES 0.8 0.0 - 1.0 K/UL  
 ABS. EOSINOPHILS 0.2 0.0 - 0.4 K/UL  
 ABS. BASOPHILS 0.0 0.0 - 0.1 K/UL  
 ABS. IMM. GRANS. 0.0 0.00 - 0.04 K/UL  
 DF AUTOMATED Arianne MD Daija 
[address] [place] [address], [address] [place] [address] Phone - [phone] Fax - [phone] 
[email or web address]

## 2018-10-24 NOTE — PROGRESS NOTES
Problem: Pressure Injury - Risk of 
Goal: *Prevention of pressure injury Document Tristin Scale and appropriate interventions in the flowsheet. Outcome: Progressing Towards Goal 
Pressure Injury Interventions: 
Sensory Interventions: Assess changes in LOC, Keep linens dry and wrinkle-free, Minimize linen layers, Pad between skin to skin, Turn and reposition approx. every two hours (pillows and wedges if needed), Pressure redistribution bed/mattress (bed type) Moisture Interventions: Absorbent underpads, Check for incontinence Q2 hours and as needed Activity Interventions: Increase time out of bed, Pressure redistribution bed/mattress(bed type), PT/OT evaluation Mobility Interventions: HOB 30 degrees or less, Pressure redistribution bed/mattress (bed type), PT/OT evaluation, Turn and reposition approx. every two hours(pillow and wedges) Nutrition Interventions: Document food/fluid/supplement intake Friction and Shear Interventions: HOB 30 degrees or less, Lift sheet Pt being discharged today to [facility] care. Pt aware of the discharge. Dr. Kesha Araiza and Case manger noified pt's son Jenifer Javier. Will continue to monitor and educate. 1444: Report was called to [facility] care, opportunity was given for questions. Copy of patient's discharge instructions, prescriptions, kardex, MAR sent in pt packet.

## 2018-12-27 ENCOUNTER — APPOINTMENT (OUTPATIENT)
Dept: GENERAL RADIOLOGY | Age: 83
End: 2018-12-27
Attending: EMERGENCY MEDICINE
Payer: MEDICARE

## 2018-12-27 ENCOUNTER — HOSPITAL ENCOUNTER (EMERGENCY)
Age: 83
Discharge: HOME | End: 2018-12-27
Attending: EMERGENCY MEDICINE
Payer: MEDICARE

## 2018-12-27 VITALS
RESPIRATION RATE: 19 BRPM | HEART RATE: 84 BPM | DIASTOLIC BLOOD PRESSURE: 70 MMHG | WEIGHT: 163.36 LBS | HEIGHT: 64 IN | BODY MASS INDEX: 27.89 KG/M2 | TEMPERATURE: 98.2 F | OXYGEN SATURATION: 94 % | SYSTOLIC BLOOD PRESSURE: 113 MMHG

## 2018-12-27 DIAGNOSIS — R60.0 BILATERAL LEG EDEMA: ICD-10-CM

## 2018-12-27 DIAGNOSIS — R06.00 DYSPNEA, UNSPECIFIED TYPE: Primary | ICD-10-CM

## 2018-12-27 LAB
COMMENT, HOLDF: NORMAL
SAMPLES BEING HELD,HOLD: NORMAL

## 2018-12-27 PROCEDURE — 99284 EMERGENCY DEPT VISIT MOD MDM: CPT

## 2018-12-27 PROCEDURE — 71045 X-RAY EXAM CHEST 1 VIEW: CPT

## 2018-12-28 NOTE — DISCHARGE INSTRUCTIONS
Leg and Ankle Edema: Care Instructions  Your Care Instructions  Swelling in the legs, ankles, and feet is called edema. It is common after you sit or stand for a while. Long plane flights or car rides often cause swelling in the legs and feet. You may also have swelling if you have to stand for long periods of time at your job. Problems with the veins in the legs (varicose veins) and changes in hormones can also cause swelling. Sometimes the swelling in the ankles and feet is caused by a more serious problem, such as heart failure, infection, blood clots, or liver or kidney disease. Follow-up care is a key part of your treatment and safety. Be sure to make and go to all appointments, and call your doctor if you are having problems. It's also a good idea to know your test results and keep a list of the medicines you take. How can you care for yourself at home? · If your doctor gave you medicine, take it as prescribed. Call your doctor if you think you are having a problem with your medicine. · Whenever you are resting, raise your legs up. Try to keep the swollen area higher than the level of your heart. · Take breaks from standing or sitting in one position. ? Walk around to increase the blood flow in your lower legs. ? Move your feet and ankles often while you stand, or tighten and relax your leg muscles. · Wear support stockings. Put them on in the morning, before swelling gets worse. · Eat a balanced diet. Lose weight if you need to. · Limit the amount of salt (sodium) in your diet. Salt holds fluid in the body and may increase swelling. When should you call for help? Call 911 anytime you think you may need emergency care. For example, call if:    · You have symptoms of a blood clot in your lung (called a pulmonary embolism). These may include:  ? Sudden chest pain. ? Trouble breathing. ?  Coughing up blood.    Call your doctor now or seek immediate medical care if:    · You have signs of a blood clot, such as:  ? Pain in your calf, back of the knee, thigh, or groin. ? Redness and swelling in your leg or groin.     · You have symptoms of infection, such as:  ? Increased pain, swelling, warmth, or redness. ? Red streaks or pus. ? A fever.    Watch closely for changes in your health, and be sure to contact your doctor if:    · Your swelling is getting worse.     · You have new or worsening pain in your legs.     · You do not get better as expected. Where can you learn more? Go to http://emory-maria luz.info/. Enter G271 in the search box to learn more about \"Leg and Ankle Edema: Care Instructions. \"  Current as of: November 20, 2017  Content Version: 11.8  © 1402-1904 [employer]. Care instructions adapted under license by [employer] (which disclaims liability or warranty for this information). If you have questions about a medical condition or this instruction, always ask your healthcare professional. [place] [address] any warranty or liability for your use of this information. Shortness of Breath: Care Instructions  Your Care Instructions  Shortness of breath has many causes. Sometimes conditions such as anxiety can lead to shortness of breath. Some people get mild shortness of breath when they exercise. Trouble breathing also can be a symptom of a serious problem, such as asthma, lung disease, emphysema, heart problems, and pneumonia. If your shortness of breath continues, you may need tests and treatment. Watch for any changes in your breathing and other symptoms. Follow-up care is a key part of your treatment and safety. Be sure to make and go to all appointments, and call your doctor if you are having problems. It's also a good idea to know your test results and keep a list of the medicines you take. How can you care for yourself at home? · Do not smoke or allow others to smoke around you.  If you need help quitting, talk to your doctor about stop-smoking programs and medicines. These can increase your chances of quitting for good. · Get plenty of rest and sleep. · Take your medicines exactly as prescribed. Call your doctor if you think you are having a problem with your medicine. · Find healthy ways to deal with stress. ? Exercise daily. ? Get plenty of sleep. ? Eat regularly and well. When should you call for help? Call 911 anytime you think you may need emergency care. For example, call if:    · You have severe shortness of breath.     · You have symptoms of a heart attack. These may include:  ? Chest pain or pressure, or a strange feeling in the chest.  ? Sweating. ? Shortness of breath. ? Nausea or vomiting. ? Pain, pressure, or a strange feeling in the back, neck, jaw, or upper belly or in one or both shoulders or arms. ? Lightheadedness or sudden weakness. ? A fast or irregular heartbeat. After you call 911, the  may tell you to chew 1 adult-strength or 2 to 4 low-dose aspirin. Wait for an ambulance. Do not try to drive yourself.    Call your doctor now or seek immediate medical care if:    · Your shortness of breath gets worse or you start to wheeze. Wheezing is a high-pitched sound when you breathe.     · You wake up at night out of breath or have to prop your head up on several pillows to breathe.     · You are short of breath after only light activity or while at rest.    Watch closely for changes in your health, and be sure to contact your doctor if:    · You do not get better over the next 1 to 2 days. Where can you learn more? Go to http://emory-maria luz.info/. Enter S780 in the search box to learn more about \"Shortness of Breath: Care Instructions. \"  Current as of: December 6, 2017  Content Version: 11.8  © 7069-1306 [employer]. Care instructions adapted under license by [employer] (which disclaims liability or warranty for this information).  If you have questions about a medical condition or this instruction, always ask your healthcare professional. [place] [address] any warranty or liability for your use of this information.

## 2018-12-28 NOTE — ED NOTES
2010:  Assumed care at this time, advised by Charge Nurse that patient does not want visitors. 2040:  Patient states she wants her son to come to room at this time. 2138:  Patient is ready for discharge at this time, awaiting transportation. 2158:  MD reviewed discharge instructions and options with patient and patient verbalized understanding. RN reviewed discharge instructions using teachback method. Pt taken by wheel chair to front and assisted into car and in no signs of acute distress escorted by family, and family will drive home. No complaints or needs expressed at this time. Patient was counseled on medications prescribed at discharge. VSS, verbalized relief from most intense pain. Patient to call PCP in the morning for appointment.
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
## 2019-01-02 NOTE — H&P
Please drink plenty of fluids.  Please get plenty of rest.  Please return here or go to the Emergency Department for any concerns or worsening of condition.  If you were prescribed antibiotics, please take them to completion.  If you were given wait & see antibiotics, please wait 5-7 days before taking them, and only take them if your symptoms have worsened or not improved.  If you do begin taking the antibiotics, please take them to completion.  If you were prescribed a narcotic medication, do not drive or operate heavy equipment or machinery while taking these medications.  If you were given a steroid shot in the clinic and have also been given a prescription for a steroid such as Prednisone or a Medrol Dose Pack, please begin taking them tomorrow.  If you do not have Hypertension or any history of palpitations, it is ok to take over the counter Sudafed or Mucinex D or Allegra-D or Claritin-D or Zyrtec-D.  If you do take one of the above, it is ok to combine that with plain over the counter Mucinex or Allegra or Claritin or Zyrtec.  If for example you are taking Zyrtec -D, you can combine that with Mucinex, but not Mucinex-D.  If you are taking Mucinex-D, you can combine that with plain Allegra or Claritin or Zyrtec.   If you do have Hypertension or palpitations, it is safe to take Coricidin HBP for relief of sinus symptoms.  If not allergic, please take over the counter Tylenol (Acetaminophen) and/or Motrin (Ibuprofen) as directed for control of pain and/or fever.  Please follow up with your primary care doctor or specialist as needed.    If you  smoke, please stop smoking.  Viral Upper Respiratory Illness (Adult)  You have a viral upper respiratory illness (URI), which is another term for the common cold. This illness is contagious during the first few days. It is spread through the air by coughing and sneezing. It may also be spread by direct contact (touching the sick person and then touching your own  [address] [facility] [address]   HISTORY AND PHYSICAL       Name:  Chet Carrera   MR#:  [id number]   :  1930   Account #:  [de-identified]        Date of Adm:  2017       ATTENDING PHYSICIAN: Herve Ontiveros MD     CHIEF COMPLAINT: Generalized swelling. HISTORY OF PRESENT ILLNESS: The patient is an 51-year-old   female with past medical history of cholecystitis, history of   hypertension, allergies, who presents to the hospital with the above   mentioned symptoms. The patient reports her symptoms started about   2 weeks back. She started noticing some swelling in her hands on both   the upper extremities. The swelling persisted. Initially she thought that   the left side is more swollen than the right side, but it persisted and she   went to the [facility] on Thanksgiving day. They felt that the patient   had cellulitis of her chest wall and she was started on antibiotics. The   patient reports that she did not have any fever, did not have any rash,   redness, erythema, warmth on her chest. Regardless, the patient   reports that she continued to have symptoms. She started developing   some swelling in her legs and then started developing swelling in her   belly and in her chest. The patient got concerned and decided to come   to the hospital. In the hospital, she was found to have low albumin   levels associated with increased urine albumin and was requested to   be admitted for further management and evaluation. The patient   reports that she did not start any new medications prior to this event   happening, but does report that when the [facility], she was given   Augmentin and Bactrim for the same. The patient denies any heart   problems or liver issues. Denies having this swelling in the past. Denies   any history of blood clots.  Denies any headache, blurry vision, sore   throat, trouble swallowing, trouble with speech, any chest pain, any   shortness eyes, nose, or mouth). Frequent handwashing will decrease risk of spread. Most viral illnesses go away within 7 to 10 days with rest and simple home remedies. Sometimes the illness may last for several weeks. Antibiotics will not kill a virus, and they are generally not prescribed for this condition.    Home care  · If symptoms are severe, rest at home for the first 2 to 3 days. When you resume activity, don't let yourself get too tired.  · Avoid being exposed to cigarette smoke (yours or others).  · You may use acetaminophen or ibuprofen to control pain and fever, unless another medicine was prescribed. (Note: If you have chronic liver or kidney disease, have ever had a stomach ulcer or gastrointestinal bleeding, or are taking blood-thinning medicines, talk with your healthcare provider before using these medicines.) Aspirin should never be given to anyone under 18 years of age who is ill with a viral infection or fever. It may cause severe liver or brain damage.  · Your appetite may be poor, so a light diet is fine. Avoid dehydration by drinking 6 to 8 glasses of fluids per day (water, soft drinks, juices, tea, or soup). Extra fluids will help loosen secretions in the nose and lungs.  · Over-the-counter cold medicines will not shorten the length of time youre sick, but they may be helpful for the following symptoms: cough, sore throat, and nasal and sinus congestion. (Note: Do not use decongestants if you have high blood pressure.)  Follow-up care  Follow up with your healthcare provider, or as advised.  When to seek medical advice  Call your healthcare provider right away if any of these occur:  · Cough with lots of colored sputum (mucus)  · Severe headache; face, neck, or ear pain  · Difficulty swallowing due to throat pain  · Fever of 100.4°F (38°C)  Call 911, or get immediate medical care  Call emergency services right away if any of these occur:  · Chest pain, shortness of breath, wheezing, or difficulty  of breath, cough, fever, chills, constipation, diarrhea, urinary   symptoms, focal or generalized neurological weakness, recent travels,   sick contacts, falls, injuries, hematemesis, melena, hemoptysis or any   other concerns or problems. PAST MEDICAL HISTORY: See above. HOME MEDICATIONS: Currently the patient is on   1. Fluticasone. 2. Cholecalciferol. 3. Metoprolol 50 mg daily. SOCIAL HISTORY: Former smoker. Occasional alcohol use. Denies IV   drug abuse. Lives at home. ALLERGIES: SEAFOOD. FAMILY HISTORY: Shows father had history of heart disease. Sister   had history of stroke, heart disease and diabetes. SOCIAL HISTORY: The patient is a former smoker. Occasional   alcohol. Denies IV drug abuse. Lives at home. REVIEW OF SYSTEMS: All systems were reviewed and were found to   be was essentially negative except for the symptoms mentioned above   in HPI. PHYSICAL EXAMINATION   VITAL SIGNS: Temperature 97.5, pulse 77, respiratory rate 30, blood   pressure 132/60, pulse oximetry 99% on room air. GENERAL: Alert and oriented x3, awake, no acute distress, resting in   bed, pleasant female, appears to be stated age. HEENT: Pupils equal and reactive to light. Dry mucous membranes. NECK: Supple. CHEST: Clear to auscultation. CORONARY: S1, S2 were heard. ABDOMEN: Soft, nontender, mildly distended. Bowel sounds   hypoactive. EXTREMITIES: No clubbing, no cyanosis, 1+ pitting edema bilateral   lower extremities. the abdominal wall. NEUROPSYCHIATRIC: Pleasant mood and affect. Cranial nerves II-  XII grossly intact. Sensory grossly within normal limits. DTR 1+. Strength 5/5. The patient has pitting edema extending from the lower   part of the leg all the way to the chest wall. No rashes were noted. SKIN: Warm. LABORATORY DATA: White count 5. , hemoglobin 12.3, hematocrit   38.1, platelets 56,660. Urine shows no signs of infection.  Sodium 140,   potassium 4.5, chloride 110, bicarbonate 25, glucose 57, BUN 11,   creatinine 0.64, calcium 8.5, magnesium 2.3, bilirubin total 0.2. ALT 9,   AST 28, alkaline phosphatase 89, lipase 220. , troponin less   than 0.04. BNP 1341. X-ray of the chest shows suspect small left pleural effusion. EKG   shows junctional rhythm with low-voltage QRS. ASSESSMENT AND PLAN   1. Anasarca, most likely secondary to nephrotic syndrome. The patient   has an albumin of 0.9 associated with proteinuria. I spoke with   nephrology, Dr. Yue Burciaga, who will assess the patient and will start the   patient on albumin, IV diuretics. Hemostat  grew out DVT. Will    with nephrology for obtaining a 24 hour urine. Will order protein    ratio. Will reconsider  in the NICHOL component of hepatitis and   other etiology looking for the underlying cause for the .  noted. Layton catheter and close monitoring. 2. Hypertension. Continue home medications . 3. Thrombocytopenia, unclear etiology. Will monitor the patient closely   may consider getting Oncology consult in the morning + bleeding. At   this point in time, the patient remains stable. Further intervention per   hospitalist.  The patient appears to have chronic thrombocytopenia,   mild chronic thrombocytopenia last with a platelet count of 169 in   September 2010.    4. Gastrointestinal/deep venous thrombosis prophylaxis. The patient   will be on sequential compression devices.         Manolo Sy MD      MM / Marie Day   D:  11/27/2017   16:03   T:  11/27/2017   17:38   Job #:  [id number] breathing  · Coughing up blood  · Inability to swallow due to throat pain  Date Last Reviewed: 9/13/2015  © 5239-0057 [employer]. [address] [place] [address], [place] [address]. All rights reserved. This information is not intended as a substitute for professional medical care. Always follow your healthcare professional's instructions.

## 2019-01-25 ENCOUNTER — HOSPITAL ENCOUNTER (EMERGENCY)
Age: 84
Discharge: HOME | End: 2019-01-25
Attending: EMERGENCY MEDICINE
Payer: MEDICARE

## 2019-01-25 ENCOUNTER — APPOINTMENT (OUTPATIENT)
Dept: GENERAL RADIOLOGY | Age: 84
End: 2019-01-25
Attending: EMERGENCY MEDICINE
Payer: MEDICARE

## 2019-01-25 VITALS
TEMPERATURE: 97.4 F | DIASTOLIC BLOOD PRESSURE: 57 MMHG | HEART RATE: 74 BPM | OXYGEN SATURATION: 98 % | RESPIRATION RATE: 16 BRPM | HEIGHT: 64 IN | BODY MASS INDEX: 28.04 KG/M2 | SYSTOLIC BLOOD PRESSURE: 122 MMHG

## 2019-01-25 DIAGNOSIS — R60.1 ANASARCA: Primary | ICD-10-CM

## 2019-01-25 DIAGNOSIS — R80.9 NEPHROTIC RANGE PROTEINURIA: ICD-10-CM

## 2019-01-25 LAB
ANION GAP BLD CALC-SCNC: 14 MMOL/L (ref 10–20)
BUN BLD-MCNC: 24 MG/DL (ref 9–20)
CA-I BLD-MCNC: 1.12 MMOL/L (ref 1.12–1.32)
CHLORIDE BLD-SCNC: 105 MMOL/L (ref 98–107)
CO2 BLD-SCNC: 26 MMOL/L (ref 21–32)
COMMENT, HOLDF: NORMAL
CREAT BLD-MCNC: 1.4 MG/DL (ref 0.6–1.3)
GLUCOSE BLD-MCNC: 79 MG/DL (ref 65–100)
HCT VFR BLD CALC: 24 % (ref 35–47)
POTASSIUM BLD-SCNC: 3.7 MMOL/L (ref 3.5–5.1)
SAMPLES BEING HELD,HOLD: NORMAL
SERVICE CMNT-IMP: ABNORMAL
SODIUM BLD-SCNC: 141 MMOL/L (ref 136–145)

## 2019-01-25 PROCEDURE — 99283 EMERGENCY DEPT VISIT LOW MDM: CPT

## 2019-01-25 PROCEDURE — 71046 X-RAY EXAM CHEST 2 VIEWS: CPT

## 2019-01-25 PROCEDURE — 36600 WITHDRAWAL OF ARTERIAL BLOOD: CPT

## 2019-01-25 PROCEDURE — 80047 BASIC METABLC PNL IONIZED CA: CPT

## 2019-01-25 PROCEDURE — 93005 ELECTROCARDIOGRAM TRACING: CPT

## 2019-01-25 NOTE — ED PROVIDER NOTES
80 y.o. female with past medical history significant for kidney stones, HTN, and anasarca who presents from PCP's office via private vehicle with chief complaint of edema to legs and abd/back. 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 Patient is not a candidate for chemotherapy. Patient was seen here 12/27/18 for SOB and wheezing with lower extremity edema, had a CXR that showed a chronic small left pleural effusion. The patient was discharged home. Patient arrives today with her caregiver, sent from her PCP's office. Caregiver states the patient had blood and urine tests done in December, and today's visit was a follow up. They were told the patient had \"protein in her urine\" and were sent here. Patient reports leg swelling, abdominal distention, and TOLBERT at baseline. Caregiver and patient deny any acute changes today in her sx. Patient states she is compliant with her Bumex. There are no other acute medical concerns at this time. No pain. Old chart reviewed - last visit and DC Summary Social hx: Former smoker; Occasional EtOH use PCP: Darline Paris MD 
 
Note written by Dane Fishman, as dictated by Leanna Kemp, DO 12:50 PM 
 
 
The history is provided by the patient, medical records and a caregiver. No  was used. Past Medical History:  
Diagnosis Date  High cholesterol  Hypercholesterolemia  Hypertension  Other ill-defined conditions(799.89)   
 kidney stones Past Surgical History:  
[facility] [address] [facility] REMOVAL GALLBLADDER  11/14/2010 Family History:  
Problem Relation Age of Onset  Heart Disease Father  Stroke Sister  Heart Disease Sister  Diabetes Sister Social History Socioeconomic History  Marital status:  Spouse name: Not on file  Number of children: Not on file  Years of education: Not on file  Highest education level: Not on file Social Needs  Financial resource strain: Not on file  Food insecurity - worry: Not on file  Food insecurity - inability: Not on file  Transportation needs - medical: Not on file  Transportation needs - non-medical: Not on file Occupational History  Not on file Tobacco Use  Smoking status: Former Smoker  Smokeless tobacco: Never Used Substance and Sexual Activity  Alcohol use: Yes Comment: occassionally  Drug use: No  
 Sexual activity: Yes  
  Partners: Male Birth control/protection: None Other Topics Concern  Not on file Social History Narrative ** Merged History Encounter ** ALLERGIES: Seafood [shellfish containing products] Review of Systems Respiratory: Positive for shortness of breath. Cardiovascular: Positive for leg swelling. Gastrointestinal: Positive for abdominal pain. All other systems reviewed and are negative. Vitals:  
 01/25/19 1211 Pulse: 73 SpO2: 95% Physical Exam  
  
Constitutional: Pt is awake and alert. NAD. Frail and elderly appearing. HENT:  
Head: Normocephalic and atraumatic. Nose: Nose normal.  
Mouth/Throat: Oropharynx is clear and moist. No oropharyngeal exudate. Eyes: Conjunctivae and extraocular motions are normal. Pupils are equal, round, and reactive to light. Right eye exhibits no discharge. Left eye exhibits no discharge. No scleral icterus. Neck: No tracheal deviation present. Supple neck. Cardiovascular: Normal rate, regular rhythm, normal heart sounds and intact distal pulses. Exam reveals no gallop and no friction rub. No murmur heard. Pulmonary/Chest: Effort normal.  Pt  has no wheezes. Pt  has no rales. Decreased lung sounds on the right. Abdominal: Soft. Pt  exhibits no mass. No tenderness. Pt  has no rebound and no guarding. Musculoskeletal:  Pt  exhibits no tenderness. Edema from feet up to sacrum. Ext: Normal ROM in all four extremities; not tender to palpation; distal pulses are normal.  
Neurological:  Pt is alert. nonfocal neuro exam. 
Skin: Skin is warm and dry. Pt  is not diaphoretic. Psychiatric:  Pt  has a normal mood and affect. Behavior is normal.  
 
Note written by Dane Moore, as dictated by Tim Rodriguez DO 12:50 PM 
 
MDM Procedures ED EKG interpretation: 
Rhythm: normal sinus rhythm; and regular . Rate (approx.): 74 bpm; Low voltage QRS. Note written by Dane Moore, as dictated by Tim Rodriguez DO 12:44 PM 
 
1:15 PM 
Have tried to call the patient's PCP office over 5 times without an answer. Currently on hold, waiting for an answer. 1:31 PM 
Dr. Christiano Isaac left the office at 11:30 AM today. Currently trying to reach another provider in the practice. 1:38 PM 
Spoke with Praveen Harper NP, at the [facility] (on call for Dr Christiano Isaac). Discussed the patient's admission in October, the plan discussed by Palliative, and her similar presentation today. Discussed that I was planning on discharging the patient. She is to leave a note for Dr. Christiano Isaac. Farideh Donovan DO Reviewed cxr images Cr reassuring Elevation of legs Comp stockings D/w caregiver

## 2019-01-25 NOTE — ED TRIAGE NOTES
Patient in from home for shortness of breath for a month, denies chest pain. Reports clear sputum with cough

## 2019-01-25 NOTE — DISCHARGE INSTRUCTIONS
Try elevating legs    Try compression stockings on legs  Continue all meds  Follow up with Dr Mika Matos as directed    Patient Education        Leg and Ankle Edema: Care Instructions  Your Care Instructions  Swelling in the legs, ankles, and feet is called edema. It is common after you sit or stand for a while. Long plane flights or car rides often cause swelling in the legs and feet. You may also have swelling if you have to stand for long periods of time at your job. Problems with the veins in the legs (varicose veins) and changes in hormones can also cause swelling. Sometimes the swelling in the ankles and feet is caused by a more serious problem, such as heart failure, infection, blood clots, or liver or kidney disease. Follow-up care is a key part of your treatment and safety. Be sure to make and go to all appointments, and call your doctor if you are having problems. It's also a good idea to know your test results and keep a list of the medicines you take. How can you care for yourself at home? · If your doctor gave you medicine, take it as prescribed. Call your doctor if you think you are having a problem with your medicine. · Whenever you are resting, raise your legs up. Try to keep the swollen area higher than the level of your heart. · Take breaks from standing or sitting in one position. ? Walk around to increase the blood flow in your lower legs. ? Move your feet and ankles often while you stand, or tighten and relax your leg muscles. · Wear support stockings. Put them on in the morning, before swelling gets worse. · Eat a balanced diet. Lose weight if you need to. · Limit the amount of salt (sodium) in your diet. Salt holds fluid in the body and may increase swelling. When should you call for help? Call 911 anytime you think you may need emergency care. For example, call if:    · You have symptoms of a blood clot in your lung (called a pulmonary embolism).  These may include:  ? Sudden chest pain.  ? Trouble breathing. ? Coughing up blood.    Call your doctor now or seek immediate medical care if:    · You have signs of a blood clot, such as:  ? Pain in your calf, back of the knee, thigh, or groin. ? Redness and swelling in your leg or groin.     · You have symptoms of infection, such as:  ? Increased pain, swelling, warmth, or redness. ? Red streaks or pus. ? A fever.    Watch closely for changes in your health, and be sure to contact your doctor if:    · Your swelling is getting worse.     · You have new or worsening pain in your legs.     · You do not get better as expected. Where can you learn more? Go to http://emory-maria luz.info/. Enter P122 in the search box to learn more about \"Leg and Ankle Edema: Care Instructions. \"  Current as of: September 23, 2018  Content Version: 11.9  © 0888-5595 [employer]. Care instructions adapted under license by [employer] (which disclaims liability or warranty for this information). If you have questions about a medical condition or this instruction, always ask your healthcare professional. [place] [address] any warranty or liability for your use of this information.

## 2019-01-26 LAB
ATRIAL RATE: 74 BPM
CALCULATED P AXIS, ECG09: 12 DEGREES
CALCULATED R AXIS, ECG10: -15 DEGREES
CALCULATED T AXIS, ECG11: -5 DEGREES
DIAGNOSIS, 93000: NORMAL
P-R INTERVAL, ECG05: 148 MS
Q-T INTERVAL, ECG07: 316 MS
QRS DURATION, ECG06: 64 MS
QTC CALCULATION (BEZET), ECG08: 350 MS
VENTRICULAR RATE, ECG03: 74 BPM

## 2021-05-25 NOTE — PROGRESS NOTES
10/19/18; 11:30 - 
CM met with patient at bedside to discuss SNF recommendation. Patient is hesitant regarding SNF placement and stated that she needed time to think about it. Patient stated that is not saying yes or no. CM asked if patient would like CM to contact her son to discuss recommendation, and patient clearly stated, \"No, do not call my son. \"  Patient again stated that neither she nor her son are saying yes or no to the placement at this time, and that they will discuss it when he gets into town. CM to leave hand off to weekend CM team to follow up with patient regarding placement. CRM: Nano Patel, MPH; Z: [phone] Physical Therapy  Visit Type: treatment  Precautions:  Medical precautions: ; standard precautions.   Lines:     Basic: IV, urinary catheter, telemetry and O2      Lines in chart and on patient reviewed, cautions maintained throughout session.    SUBJECTIVE  Patient agreed to participate in therapy this date.  Pt in bed, agreeable to therapy and anxious to get into chair for lunch.    Pain     At onset of session (out of 10): 0     OBJECTIVE     Oriented to person, place, time and situation     Bed Mobility:      Rolling right: set up    Repositioning in bed: set up      Supine to sit: set up  Training completed:      HOB elevated and pt using bed rails for assist.  Pt moves slowly and requires extra time for movement.  Once sitting at EOB, pt able to scoot buttocks to EOB and maintain sitting balance without UE support.  Transfers:    Assistive devices: 2-wheeled walker    Sit to stand: minimal assist    Stand to sit: minimal assist    Stand pivot: minimal assist    Toilet: minimal assist  Training completed:    Tasks: sit to stand, stand to sit, stand pivot and toilet    Min assist for sit <> stand from EOB and commode and min assist for pivot transfer with walker.  Pt moves slowly guarded with transition, for standing from EOB pt following cues to push up from bed but pt needing to pull up from walker with therapist bracing walker to stand.  Pt able to use arm rests of commode for standing.  Min assist for pivot transfer bed to commode to recliner with assist with IV pole, O2 line and catheter.  Gait/Ambulation:     Assistance: minimal assist   Assistive device: 2-wheeled walker    Distance (ft): 2; 4    Type: difficulty advancing assistive device, excessive flexed posture, step to and unsteady  Training Completed:    Tasks: gait training on level surfaces    Pt able to take steps with pivot transfer from bed to commode and commode to recliner with increased unsteadiness noted.      Interventions     Training  provided: activity tolerance, balance retraining, bed mobility training, gait training, safety training and transfer training         ASSESSMENT    Impairments: strength, balance deficits, safety awareness, activity tolerance and endurance  Functional Limitations: all functional mobility  -pt able to take steps with pivot transfer this date, previously moshe motley used  -min assist for standing, transfers and pivot transfers with walker, pt unable to push up from bed to stand, needing to pull up from walker with therapist bracing walker  -SBA for bed mob with increased effort and time needed to achieve sitting     Discharge Recommendations  Recommendation for Discharge: PT WI: Sub-acute nursing home         PT/OT Mobility Equipment for Discharge: continue to assess - currently requires use of walker or moshe stedy - owns a 4ww at home      PT Identified Barriers to Discharge: medical status, lives alone, fall risk, oxygen  needs     Skilled therapy is required to address these limitations in attempt to maximize the patient's independence.  Progress: progressing toward goals    End of Session:   Location: in chair  Safety measures: call light within reach, equipment intact and lines intact  Handoff to: nurse    PLAN    Suggestions for next session as indicated: Progress transfers, bed mob, strength, balance, safety, gait, and mobility as tolerated to facilitate safe return to prior living with improved safety, decreased fall risk and decreased caregiver burden.  PT Frequency: Once a day, 5 days/week  Frequency Comments: 3/5 by 5/28.      Interventions: balance, bed mobility, functional transfer training, gait training, strengthening and safety education      Documented in the chart in the following areas: Assessment.      Therapy procedure time and total treatment time can be found documented on the Time Entry flowsheet